# Patient Record
Sex: FEMALE | Race: WHITE | NOT HISPANIC OR LATINO | Employment: OTHER | URBAN - METROPOLITAN AREA
[De-identification: names, ages, dates, MRNs, and addresses within clinical notes are randomized per-mention and may not be internally consistent; named-entity substitution may affect disease eponyms.]

---

## 2017-03-30 ENCOUNTER — TELEPHONE (OUTPATIENT)
Dept: NEUROLOGY | Facility: MEDICAL CENTER | Age: 67
End: 2017-03-30

## 2017-03-30 DIAGNOSIS — G30.9 ALZHEIMER'S DEMENTIA WITHOUT BEHAVIORAL DISTURBANCE, UNSPECIFIED TIMING OF DEMENTIA ONSET: ICD-10-CM

## 2017-03-30 DIAGNOSIS — F02.80 ALZHEIMER'S DEMENTIA WITHOUT BEHAVIORAL DISTURBANCE, UNSPECIFIED TIMING OF DEMENTIA ONSET: ICD-10-CM

## 2017-03-30 RX ORDER — DONEPEZIL HYDROCHLORIDE 10 MG/1
10 TABLET, FILM COATED ORAL EVERY EVENING
Qty: 90 TAB | Refills: 2 | Status: SHIPPED | OUTPATIENT
Start: 2017-03-30 | End: 2022-11-01

## 2017-03-30 NOTE — TELEPHONE ENCOUNTER
I will put a request in for the medication for a 90 day supply but she does need to make a follow up appointment to see Dr. Toscano. Thank you. KA

## 2017-03-30 NOTE — TELEPHONE ENCOUNTER
Pt is in need of refills for the Aricept. Pt is going out of town for 2 months and then is due to see you in the clinic. FRANKO

## 2017-03-30 NOTE — TELEPHONE ENCOUNTER
Jayshree called stating she has no more refills on her Aricept and is leaving on vacation for 2 months in May. Does she need an appointment for refills or can we send them to her pharmacy? ([facility] [address] in [place])

## 2018-07-13 ENCOUNTER — APPOINTMENT (OUTPATIENT)
Dept: RADIOLOGY | Facility: MEDICAL CENTER | Age: 68
DRG: 378 | End: 2018-07-13
Attending: HOSPITALIST
Payer: COMMERCIAL

## 2018-07-13 ENCOUNTER — HOSPITAL ENCOUNTER (INPATIENT)
Facility: MEDICAL CENTER | Age: 68
LOS: 1 days | DRG: 378 | End: 2018-07-14
Attending: EMERGENCY MEDICINE | Admitting: HOSPITALIST
Payer: COMMERCIAL

## 2018-07-13 DIAGNOSIS — K92.1 HEMATOCHEZIA: ICD-10-CM

## 2018-07-13 DIAGNOSIS — F02.80 ALZHEIMER'S DEMENTIA WITHOUT BEHAVIORAL DISTURBANCE, UNSPECIFIED TIMING OF DEMENTIA ONSET: ICD-10-CM

## 2018-07-13 DIAGNOSIS — G30.9 ALZHEIMER'S DEMENTIA WITHOUT BEHAVIORAL DISTURBANCE, UNSPECIFIED TIMING OF DEMENTIA ONSET: ICD-10-CM

## 2018-07-13 PROBLEM — I95.9 HYPOTENSIVE EPISODE: Status: ACTIVE | Noted: 2018-07-13

## 2018-07-13 PROBLEM — F03.90 DEMENTIA (HCC): Status: ACTIVE | Noted: 2018-07-13

## 2018-07-13 LAB
ABO GROUP BLD: NORMAL
ALBUMIN SERPL BCP-MCNC: 4.1 G/DL (ref 3.2–4.9)
ALBUMIN/GLOB SERPL: 1.4 G/DL
ALP SERPL-CCNC: 52 U/L (ref 30–99)
ALT SERPL-CCNC: 14 U/L (ref 2–50)
ANION GAP SERPL CALC-SCNC: 12 MMOL/L (ref 0–11.9)
APTT PPP: 25.7 SEC (ref 24.7–36)
AST SERPL-CCNC: 20 U/L (ref 12–45)
BASOPHILS # BLD AUTO: 0.5 % (ref 0–1.8)
BASOPHILS # BLD AUTO: 0.5 % (ref 0–1.8)
BASOPHILS # BLD: 0.05 K/UL (ref 0–0.12)
BASOPHILS # BLD: 0.05 K/UL (ref 0–0.12)
BILIRUB SERPL-MCNC: 0.3 MG/DL (ref 0.1–1.5)
BLD GP AB SCN SERPL QL: NORMAL
BUN SERPL-MCNC: 22 MG/DL (ref 8–22)
CALCIUM SERPL-MCNC: 9 MG/DL (ref 8.5–10.5)
CFT BLD TEG: 5.8 MIN (ref 5–10)
CHLORIDE SERPL-SCNC: 109 MMOL/L (ref 96–112)
CLOT ANGLE BLD TEG: 67.5 DEGREES (ref 53–72)
CLOT LYSIS 30M P MA LENFR BLD TEG: 4.6 % (ref 0–8)
CO2 SERPL-SCNC: 21 MMOL/L (ref 20–33)
CREAT SERPL-MCNC: 0.84 MG/DL (ref 0.5–1.4)
CT.EXTRINSIC BLD ROTEM: 1.7 MIN (ref 1–3)
EOSINOPHIL # BLD AUTO: 0.07 K/UL (ref 0–0.51)
EOSINOPHIL # BLD AUTO: 0.21 K/UL (ref 0–0.51)
EOSINOPHIL NFR BLD: 0.7 % (ref 0–6.9)
EOSINOPHIL NFR BLD: 2.3 % (ref 0–6.9)
ERYTHROCYTE [DISTWIDTH] IN BLOOD BY AUTOMATED COUNT: 51.9 FL (ref 35.9–50)
ERYTHROCYTE [DISTWIDTH] IN BLOOD BY AUTOMATED COUNT: 52.6 FL (ref 35.9–50)
EST. AVERAGE GLUCOSE BLD GHB EST-MCNC: 120 MG/DL
GLOBULIN SER CALC-MCNC: 3 G/DL (ref 1.9–3.5)
GLUCOSE SERPL-MCNC: 101 MG/DL (ref 65–99)
HBA1C MFR BLD: 5.8 % (ref 0–5.6)
HCT VFR BLD AUTO: 36.1 % (ref 37–47)
HCT VFR BLD AUTO: 38 % (ref 37–47)
HCT VFR BLD AUTO: 38.7 % (ref 37–47)
HCT VFR BLD AUTO: 40.9 % (ref 37–47)
HGB BLD-MCNC: 11.7 G/DL (ref 12–16)
HGB BLD-MCNC: 12.2 G/DL (ref 12–16)
HGB BLD-MCNC: 12.4 G/DL (ref 12–16)
HGB BLD-MCNC: 13.7 G/DL (ref 12–16)
IMM GRANULOCYTES # BLD AUTO: 0.02 K/UL (ref 0–0.11)
IMM GRANULOCYTES # BLD AUTO: 0.04 K/UL (ref 0–0.11)
IMM GRANULOCYTES NFR BLD AUTO: 0.2 % (ref 0–0.9)
IMM GRANULOCYTES NFR BLD AUTO: 0.4 % (ref 0–0.9)
INR PPP: 0.99 (ref 0.87–1.13)
INR PPP: 1.07 (ref 0.87–1.13)
LYMPHOCYTES # BLD AUTO: 1.84 K/UL (ref 1–4.8)
LYMPHOCYTES # BLD AUTO: 3.31 K/UL (ref 1–4.8)
LYMPHOCYTES NFR BLD: 17.1 % (ref 22–41)
LYMPHOCYTES NFR BLD: 36.2 % (ref 22–41)
MAGNESIUM SERPL-MCNC: 2.2 MG/DL (ref 1.5–2.5)
MCF BLD TEG: 63.4 MM (ref 50–70)
MCH RBC QN AUTO: 32.3 PG (ref 27–33)
MCH RBC QN AUTO: 32.9 PG (ref 27–33)
MCHC RBC AUTO-ENTMCNC: 32 G/DL (ref 33.6–35)
MCHC RBC AUTO-ENTMCNC: 33.5 G/DL (ref 33.6–35)
MCV RBC AUTO: 100.8 FL (ref 81.4–97.8)
MCV RBC AUTO: 98.3 FL (ref 81.4–97.8)
MONOCYTES # BLD AUTO: 0.62 K/UL (ref 0–0.85)
MONOCYTES # BLD AUTO: 0.73 K/UL (ref 0–0.85)
MONOCYTES NFR BLD AUTO: 5.8 % (ref 0–13.4)
MONOCYTES NFR BLD AUTO: 8 % (ref 0–13.4)
NEUTROPHILS # BLD AUTO: 4.82 K/UL (ref 2–7.15)
NEUTROPHILS # BLD AUTO: 8.14 K/UL (ref 2–7.15)
NEUTROPHILS NFR BLD: 52.8 % (ref 44–72)
NEUTROPHILS NFR BLD: 75.5 % (ref 44–72)
NRBC # BLD AUTO: 0 K/UL
NRBC # BLD AUTO: 0 K/UL
NRBC BLD-RTO: 0 /100 WBC
NRBC BLD-RTO: 0 /100 WBC
PA AA BLD-ACNC: 44.9 %
PA ADP BLD-ACNC: 0 %
PLATELET # BLD AUTO: 252 K/UL (ref 164–446)
PLATELET # BLD AUTO: 282 K/UL (ref 164–446)
PMV BLD AUTO: 10.5 FL (ref 9–12.9)
PMV BLD AUTO: 10.6 FL (ref 9–12.9)
POTASSIUM SERPL-SCNC: 4 MMOL/L (ref 3.6–5.5)
PROT SERPL-MCNC: 7.1 G/DL (ref 6–8.2)
PROTHROMBIN TIME: 12.8 SEC (ref 12–14.6)
PROTHROMBIN TIME: 13.6 SEC (ref 12–14.6)
RBC # BLD AUTO: 3.84 M/UL (ref 4.2–5.4)
RBC # BLD AUTO: 4.16 M/UL (ref 4.2–5.4)
RH BLD: NORMAL
SODIUM SERPL-SCNC: 142 MMOL/L (ref 135–145)
TEG ALGORITHM TGALG: NORMAL
TSH SERPL DL<=0.005 MIU/L-ACNC: 4.76 UIU/ML (ref 0.38–5.33)
WBC # BLD AUTO: 10.8 K/UL (ref 4.8–10.8)
WBC # BLD AUTO: 9.1 K/UL (ref 4.8–10.8)

## 2018-07-13 PROCEDURE — A9270 NON-COVERED ITEM OR SERVICE: HCPCS | Performed by: HOSPITALIST

## 2018-07-13 PROCEDURE — 85025 COMPLETE CBC W/AUTO DIFF WBC: CPT | Mod: 91

## 2018-07-13 PROCEDURE — 36415 COLL VENOUS BLD VENIPUNCTURE: CPT

## 2018-07-13 PROCEDURE — 83036 HEMOGLOBIN GLYCOSYLATED A1C: CPT

## 2018-07-13 PROCEDURE — 700117 HCHG RX CONTRAST REV CODE 255: Performed by: EMERGENCY MEDICINE

## 2018-07-13 PROCEDURE — 86901 BLOOD TYPING SEROLOGIC RH(D): CPT

## 2018-07-13 PROCEDURE — 86900 BLOOD TYPING SEROLOGIC ABO: CPT

## 2018-07-13 PROCEDURE — 700105 HCHG RX REV CODE 258: Performed by: HOSPITALIST

## 2018-07-13 PROCEDURE — 99285 EMERGENCY DEPT VISIT HI MDM: CPT

## 2018-07-13 PROCEDURE — 770020 HCHG ROOM/CARE - TELE (206)

## 2018-07-13 PROCEDURE — 85576 BLOOD PLATELET AGGREGATION: CPT | Mod: 91

## 2018-07-13 PROCEDURE — 80053 COMPREHEN METABOLIC PANEL: CPT

## 2018-07-13 PROCEDURE — 85018 HEMOGLOBIN: CPT

## 2018-07-13 PROCEDURE — 85730 THROMBOPLASTIN TIME PARTIAL: CPT

## 2018-07-13 PROCEDURE — 74174 CTA ABD&PLVS W/CONTRAST: CPT

## 2018-07-13 PROCEDURE — 85347 COAGULATION TIME ACTIVATED: CPT

## 2018-07-13 PROCEDURE — 83735 ASSAY OF MAGNESIUM: CPT

## 2018-07-13 PROCEDURE — 85014 HEMATOCRIT: CPT | Mod: 91

## 2018-07-13 PROCEDURE — 86850 RBC ANTIBODY SCREEN: CPT

## 2018-07-13 PROCEDURE — 99223 1ST HOSP IP/OBS HIGH 75: CPT | Performed by: HOSPITALIST

## 2018-07-13 PROCEDURE — 85610 PROTHROMBIN TIME: CPT

## 2018-07-13 PROCEDURE — 85384 FIBRINOGEN ACTIVITY: CPT

## 2018-07-13 PROCEDURE — 84443 ASSAY THYROID STIM HORMONE: CPT

## 2018-07-13 PROCEDURE — 700102 HCHG RX REV CODE 250 W/ 637 OVERRIDE(OP): Performed by: HOSPITALIST

## 2018-07-13 RX ORDER — LIOTHYRONINE SODIUM 5 UG/1
5 TABLET ORAL DAILY
COMMUNITY
End: 2019-08-03

## 2018-07-13 RX ORDER — LEVOTHYROXINE SODIUM 88 UG/1
88 TABLET ORAL DAILY
Status: DISCONTINUED | OUTPATIENT
Start: 2018-07-13 | End: 2018-07-14 | Stop reason: HOSPADM

## 2018-07-13 RX ORDER — SODIUM CHLORIDE, SODIUM LACTATE, POTASSIUM CHLORIDE, CALCIUM CHLORIDE 600; 310; 30; 20 MG/100ML; MG/100ML; MG/100ML; MG/100ML
INJECTION, SOLUTION INTRAVENOUS CONTINUOUS
Status: DISCONTINUED | OUTPATIENT
Start: 2018-07-13 | End: 2018-07-14 | Stop reason: HOSPADM

## 2018-07-13 RX ORDER — FERROUS SULFATE 325(65) MG
325 TABLET ORAL DAILY
Status: DISCONTINUED | OUTPATIENT
Start: 2018-07-13 | End: 2018-07-13

## 2018-07-13 RX ORDER — OMEPRAZOLE 20 MG/1
20 CAPSULE, DELAYED RELEASE ORAL DAILY
Status: DISCONTINUED | OUTPATIENT
Start: 2018-07-13 | End: 2018-07-14 | Stop reason: HOSPADM

## 2018-07-13 RX ORDER — SODIUM CHLORIDE 9 MG/ML
INJECTION, SOLUTION INTRAVENOUS ONCE
Status: DISCONTINUED | OUTPATIENT
Start: 2018-07-13 | End: 2018-07-13

## 2018-07-13 RX ORDER — SODIUM CHLORIDE 9 MG/ML
1000 INJECTION, SOLUTION INTRAVENOUS ONCE
Status: COMPLETED | OUTPATIENT
Start: 2018-07-13 | End: 2018-07-13

## 2018-07-13 RX ORDER — DONEPEZIL HYDROCHLORIDE 5 MG/1
10 TABLET, FILM COATED ORAL EVERY EVENING
Status: DISCONTINUED | OUTPATIENT
Start: 2018-07-13 | End: 2018-07-14 | Stop reason: HOSPADM

## 2018-07-13 RX ORDER — LEVOTHYROXINE SODIUM 88 UG/1
88 TABLET ORAL
COMMUNITY

## 2018-07-13 RX ADMIN — OMEPRAZOLE 20 MG: 20 CAPSULE, DELAYED RELEASE ORAL at 11:45

## 2018-07-13 RX ADMIN — IOHEXOL 100 ML: 350 INJECTION, SOLUTION INTRAVENOUS at 10:11

## 2018-07-13 RX ADMIN — SODIUM CHLORIDE, POTASSIUM CHLORIDE, SODIUM LACTATE AND CALCIUM CHLORIDE: 600; 310; 30; 20 INJECTION, SOLUTION INTRAVENOUS at 13:03

## 2018-07-13 RX ADMIN — SODIUM CHLORIDE 1000 ML: 9 INJECTION, SOLUTION INTRAVENOUS at 11:46

## 2018-07-13 RX ADMIN — DONEPEZIL HYDROCHLORIDE 10 MG: 5 TABLET, FILM COATED ORAL at 17:45

## 2018-07-13 ASSESSMENT — COGNITIVE AND FUNCTIONAL STATUS - GENERAL
DRESSING REGULAR UPPER BODY CLOTHING: A LITTLE
CLIMB 3 TO 5 STEPS WITH RAILING: A LITTLE
DRESSING REGULAR LOWER BODY CLOTHING: A LITTLE
WALKING IN HOSPITAL ROOM: A LITTLE
SUGGESTED CMS G CODE MODIFIER DAILY ACTIVITY: CJ
HELP NEEDED FOR BATHING: A LITTLE
DAILY ACTIVITIY SCORE: 21
MOBILITY SCORE: 22
SUGGESTED CMS G CODE MODIFIER MOBILITY: CJ

## 2018-07-13 ASSESSMENT — COPD QUESTIONNAIRES
DO YOU EVER COUGH UP ANY MUCUS OR PHLEGM?: NO/ONLY WITH OCCASIONAL COLDS OR INFECTIONS
COPD SCREENING SCORE: 2
DURING THE PAST 4 WEEKS HOW MUCH DID YOU FEEL SHORT OF BREATH: NONE/LITTLE OF THE TIME
HAVE YOU SMOKED AT LEAST 100 CIGARETTES IN YOUR ENTIRE LIFE: NO/DON'T KNOW

## 2018-07-13 ASSESSMENT — ENCOUNTER SYMPTOMS
BACK PAIN: 0
ABDOMINAL PAIN: 0
WEAKNESS: 1
SHORTNESS OF BREATH: 0
NERVOUS/ANXIOUS: 0
FALLS: 0
CHILLS: 0
DEPRESSION: 0
LOSS OF CONSCIOUSNESS: 0
BACK PAIN: 1
STRIDOR: 0
NAUSEA: 1
DIARRHEA: 0
MYALGIAS: 0
HEADACHES: 0
MEMORY LOSS: 1
BLOOD IN STOOL: 1
FEVER: 0
EYE DISCHARGE: 0
DIZZINESS: 0
TREMORS: 0
NAUSEA: 0
TINGLING: 0
BLURRED VISION: 0
COUGH: 0
VOMITING: 0

## 2018-07-13 ASSESSMENT — PATIENT HEALTH QUESTIONNAIRE - PHQ9
2. FEELING DOWN, DEPRESSED, IRRITABLE, OR HOPELESS: NOT AT ALL
SUM OF ALL RESPONSES TO PHQ9 QUESTIONS 1 AND 2: 0
1. LITTLE INTEREST OR PLEASURE IN DOING THINGS: NOT AT ALL

## 2018-07-13 ASSESSMENT — PAIN SCALES - GENERAL
PAINLEVEL_OUTOF10: 5
PAINLEVEL_OUTOF10: 0

## 2018-07-13 ASSESSMENT — LIFESTYLE VARIABLES: EVER_SMOKED: NEVER

## 2018-07-13 NOTE — ED PROVIDER NOTES
ED Provider Note    ED Provider Note    Primary care provider: Wily Garcia M.D.  Means of arrival: POV  History obtained from:   History limited by: patients Dementia    CHIEF COMPLAINT  Chief Complaint   Patient presents with   • Rectal Bleeding     Pt woke up this am and noticed she was bleeding rectally, reports had large amount on bed. Denies blood thinners. Reports had a similar episode a few years ago and received blood transfusions.        HPI  Jayshree Reinoso is a 67 y.o. female who presents to the Emergency Department her  with a chief complaint of bleeding per rectum.  This started during the night.  She is otherwise been in her normal state of health.  She denies any new pain.  She has chronic back pain and this is unchanged.  She has been tolerating a regular diet over the last several days.  She denies any abdominal pain.  No nausea or vomiting.  No urinary complaints.  No chest pain or shortness of breath.  No fever.  States she got up and felt like she had to go to the bathroom, did not make it to the bathroom.  The  felt over the bed and that was wet he turned on the lights and noticed that it was blood in the bed.  She is not anticoagulated.  She does not take aspirin.  She is taking fish oil.  He reports a similar problem in 2015 or 2016, associated with diverticulitis requiring colonoscopy and blood transfusions at the time.  She has been seen in the past by GI consultants.  She has not followed up with GI consultants since this inpatient colonoscopy and evaluation.  She does have a history of dementia which her  reports has been gradually progressive.  She has not seen a neurologist for a few years.  They did recently see her primary care doctor who started her on an additional medication for dementia.  She is already on one.    REVIEW OF SYSTEMS  Review of Systems   Unable to perform ROS: Dementia   Constitutional: Negative for chills and fever.   HENT: Negative  for congestion.    Respiratory: Negative for shortness of breath.    Cardiovascular: Negative for chest pain.   Gastrointestinal: Positive for blood in stool. Negative for abdominal pain, nausea and vomiting.   Genitourinary: Negative for dysuria.   Musculoskeletal: Positive for back pain.   Neurological: Negative for headaches.   Psychiatric/Behavioral: Positive for memory loss.       PAST MEDICAL HISTORY   has a past medical history of Alzheimer's disease; Alzheimer's disease; Anesthesia; Arthritis; Backpain; Cancer (HCC) (2010); Hypothyroid; Other specified symptom associated with female genital organs; Pain; Renal disorder; Unspecified disorder of thyroid; and Unspecified urinary incontinence.    SURGICAL HISTORY   has a past surgical history that includes scleral buckling (7/9/08); rectus repair (10/9/08); hernia repair; inguinal hernia repair (12/18/2008); laparoscopy (5/7/2009); femoral hernia repair (5/7/2009); lumbar fusion posterior (2/27/2013); lumbar laminectomy diskectomy (2/27/2013); gyn surgery; vaginectomy (7/25/2011); gyn surgery; gyn surgery; knee arthroplasty total (Right, 7/11/2016); and gastroscopy-endo (8/11/2016).    SOCIAL HISTORY  Social History   Substance Use Topics   • Smoking status: Never Smoker   • Smokeless tobacco: Never Used   • Alcohol use No      History   Drug Use No       FAMILY HISTORY  History reviewed. No pertinent family history.    CURRENT MEDICATIONS  Home Medications     Reviewed by Surjit Rangel (Pharmacy Tech) on 07/13/18 at 0916  Med List Status: Complete   Medication Last Dose Status   B Complex Vitamins (VITAMIN B COMPLEX PO) 7/12/2018 Active   CALCIUM-MAGNESIUM PO 7/12/2018 Active   Cholecalciferol (VITAMIN D) 2000 UNITS CAPS 7/12/2018 Active   Coenzyme Q10 (CO Q 10 PO) 7/12/2018 Active   donepezil (ARICEPT) 10 MG tablet 7/12/2018 Active   levothyroxine (SYNTHROID) 88 MCG Tab 7/12/2018 Active   liothyronine (CYTOMEL) 5 MCG Tab 7/12/2018 Active   Melatonin  "3 MG Cap 7/12/2018 Active   NON SPECIFIED 7/12/2018 Active   Omega-3 Fatty Acids (FISH OIL) 1200 MG Cap 7/12/2018 Active   Potassium 99 MG Tab 7/12/2018 Active   Probiotic Product (PROBIOTIC DAILY PO) 7/12/2018 Active   VITAMIN K PO 7/12/2018 Active   Vitamins A & D (VITAMIN A & D) 5000-400 UNITS Cap 7/12/2018 Active                ALLERGIES  Allergies   Allergen Reactions   • Nkda [No Known Drug Allergy]        PHYSICAL EXAM  VITAL SIGNS: /73   Pulse 88   Temp 36.6 °C (97.8 °F)   Resp 16   Ht 1.626 m (5' 4\")   Wt 65.8 kg (145 lb 1 oz)   SpO2 93%   Breastfeeding? No   BMI 24.90 kg/m²   Vitals reviewed.  Constitutional: Patient is oriented to person, place. Appears well-developed and well-nourished. No distress.    Head: Normocephalic and atraumatic.   Ears: Normal external ears bilaterally.   Mouth/Throat: Oropharynx is clear and moist  Eyes: Conjunctivae are normal.   Neck: Normal range of motion. Neck supple.  Cardiovascular: Normal rate, regular rhythm and normal heart sounds. Normal peripheral pulses.  Pulmonary/Chest: Effort normal and breath sounds normal. No respiratory distress, no wheezes, rhonchi, or rales.   Abdominal: Soft. Bowel sounds are normal. There is no tenderness. No rebound or guarding, or peritoneal signs. No CVA tenderness.  Rectal: There is a small anal tear, there does not appear to be any active bleeding.  No pain on digital rectal exam.  No masses or lesions noted.  No external hemorrhoids.  There is ena clotted blood.  Musculoskeletal: No edema and no tenderness.   Lymphadenopathy: No cervical adenopathy.   Neurological: No focal deficits.   Skin: Skin is warm and dry. No erythema. No pallor.   Psychiatric: Patient has a normal mood and affect.     LABS  Results for orders placed or performed during the hospital encounter of 07/13/18   CBC WITH DIFFERENTIAL   Result Value Ref Range    WBC 9.1 4.8 - 10.8 K/uL    RBC 4.16 (L) 4.20 - 5.40 M/uL    Hemoglobin 13.7 12.0 - 16.0 " g/dL    Hematocrit 40.9 37.0 - 47.0 %    MCV 98.3 (H) 81.4 - 97.8 fL    MCH 32.9 27.0 - 33.0 pg    MCHC 33.5 (L) 33.6 - 35.0 g/dL    RDW 51.9 (H) 35.9 - 50.0 fL    Platelet Count 282 164 - 446 K/uL    MPV 10.6 9.0 - 12.9 fL    Neutrophils-Polys 52.80 44.00 - 72.00 %    Lymphocytes 36.20 22.00 - 41.00 %    Monocytes 8.00 0.00 - 13.40 %    Eosinophils 2.30 0.00 - 6.90 %    Basophils 0.50 0.00 - 1.80 %    Immature Granulocytes 0.20 0.00 - 0.90 %    Nucleated RBC 0.00 /100 WBC    Neutrophils (Absolute) 4.82 2.00 - 7.15 K/uL    Lymphs (Absolute) 3.31 1.00 - 4.80 K/uL    Monos (Absolute) 0.73 0.00 - 0.85 K/uL    Eos (Absolute) 0.21 0.00 - 0.51 K/uL    Baso (Absolute) 0.05 0.00 - 0.12 K/uL    Immature Granulocytes (abs) 0.02 0.00 - 0.11 K/uL    NRBC (Absolute) 0.00 K/uL   COMP METABOLIC PANEL   Result Value Ref Range    Sodium 142 135 - 145 mmol/L    Potassium 4.0 3.6 - 5.5 mmol/L    Chloride 109 96 - 112 mmol/L    Co2 21 20 - 33 mmol/L    Anion Gap 12.0 (H) 0.0 - 11.9    Glucose 101 (H) 65 - 99 mg/dL    Bun 22 8 - 22 mg/dL    Creatinine 0.84 0.50 - 1.40 mg/dL    Calcium 9.0 8.5 - 10.5 mg/dL    AST(SGOT) 20 12 - 45 U/L    ALT(SGPT) 14 2 - 50 U/L    Alkaline Phosphatase 52 30 - 99 U/L    Total Bilirubin 0.3 0.1 - 1.5 mg/dL    Albumin 4.1 3.2 - 4.9 g/dL    Total Protein 7.1 6.0 - 8.2 g/dL    Globulin 3.0 1.9 - 3.5 g/dL    A-G Ratio 1.4 g/dL   PROTHROMBIN TIME (INR)   Result Value Ref Range    PT 12.8 12.0 - 14.6 sec    INR 0.99 0.87 - 1.13   APTT   Result Value Ref Range    APTT 25.7 24.7 - 36.0 sec   ESTIMATED GFR   Result Value Ref Range    GFR If African American >60 >60 mL/min/1.73 m 2    GFR If Non African American >60 >60 mL/min/1.73 m 2   CBC WITH DIFFERENTIAL   Result Value Ref Range    WBC 10.8 4.8 - 10.8 K/uL    RBC 3.84 (L) 4.20 - 5.40 M/uL    Hemoglobin 12.4 12.0 - 16.0 g/dL    Hematocrit 38.7 37.0 - 47.0 %    .8 (H) 81.4 - 97.8 fL    MCH 32.3 27.0 - 33.0 pg    MCHC 32.0 (L) 33.6 - 35.0 g/dL    RDW 52.6  (H) 35.9 - 50.0 fL    Platelet Count 252 164 - 446 K/uL    MPV 10.5 9.0 - 12.9 fL    Neutrophils-Polys 75.50 (H) 44.00 - 72.00 %    Lymphocytes 17.10 (L) 22.00 - 41.00 %    Monocytes 5.80 0.00 - 13.40 %    Eosinophils 0.70 0.00 - 6.90 %    Basophils 0.50 0.00 - 1.80 %    Immature Granulocytes 0.40 0.00 - 0.90 %    Nucleated RBC 0.00 /100 WBC    Neutrophils (Absolute) 8.14 (H) 2.00 - 7.15 K/uL    Lymphs (Absolute) 1.84 1.00 - 4.80 K/uL    Monos (Absolute) 0.62 0.00 - 0.85 K/uL    Eos (Absolute) 0.07 0.00 - 0.51 K/uL    Baso (Absolute) 0.05 0.00 - 0.12 K/uL    Immature Granulocytes (abs) 0.04 0.00 - 0.11 K/uL    NRBC (Absolute) 0.00 K/uL   TSH (Thyroid Stimulating Hormone)   Result Value Ref Range    TSH 4.760 0.380 - 5.330 uIU/mL   Hemoglobin A1c   Result Value Ref Range    Glycohemoglobin 5.8 (H) 0.0 - 5.6 %    Est Avg Glucose 120 mg/dL   Magnesium   Result Value Ref Range    Magnesium 2.2 1.5 - 2.5 mg/dL   COD (Adult)   Result Value Ref Range    ABO Grouping Only O     Rh Grouping Only POS     Antibody Screen-Cod NEG    PLATELET MAPPING WITH BASIC TEG   Result Value Ref Range    Reaction Time Initial-R 5.8 5.0 - 10.0 min    Clot Kinetics-K 1.7 1.0 - 3.0 min    Clot Angle-Angle 67.5 53.0 - 72.0 degrees    Maximum Clot Strength-MA 63.4 50.0 - 70.0 mm    Lysis 30 minutes-LY30 4.6 0.0 - 8.0 %    % Inhibition ADP 0.0 %    % Inhibition AA 44.9 %    TEG Algorithm Link Algorithm    HEMOGLOBIN AND HEMATOCRIT   Result Value Ref Range    Hemoglobin 11.7 (L) 12.0 - 16.0 g/dL    Hematocrit 36.1 (L) 37.0 - 47.0 %   Prothrombin time (INR)   Result Value Ref Range    PT 13.6 12.0 - 14.6 sec    INR 1.07 0.87 - 1.13   HEMOGLOBIN AND HEMATOCRIT   Result Value Ref Range    Hemoglobin 12.2 12.0 - 16.0 g/dL    Hematocrit 38.0 37.0 - 47.0 %   CBC without Differential   Result Value Ref Range    WBC 8.4 4.8 - 10.8 K/uL    RBC 3.66 (L) 4.20 - 5.40 M/uL    Hemoglobin 11.8 (L) 12.0 - 16.0 g/dL    Hematocrit 36.2 (L) 37.0 - 47.0 %    MCV 98.9  (H) 81.4 - 97.8 fL    MCH 32.2 27.0 - 33.0 pg    MCHC 32.6 (L) 33.6 - 35.0 g/dL    RDW 51.2 (H) 35.9 - 50.0 fL    Platelet Count 231 164 - 446 K/uL    MPV 10.3 9.0 - 12.9 fL   Comp Metabolic Panel (CMP)   Result Value Ref Range    Sodium 139 135 - 145 mmol/L    Potassium 3.8 3.6 - 5.5 mmol/L    Chloride 110 96 - 112 mmol/L    Co2 21 20 - 33 mmol/L    Anion Gap 8.0 0.0 - 11.9    Glucose 80 65 - 99 mg/dL    Bun 13 8 - 22 mg/dL    Creatinine 0.59 0.50 - 1.40 mg/dL    Calcium 8.1 (L) 8.5 - 10.5 mg/dL    AST(SGOT) 13 12 - 45 U/L    ALT(SGPT) 11 2 - 50 U/L    Alkaline Phosphatase 47 30 - 99 U/L    Total Bilirubin 0.4 0.1 - 1.5 mg/dL    Albumin 3.5 3.2 - 4.9 g/dL    Total Protein 5.9 (L) 6.0 - 8.2 g/dL    Globulin 2.4 1.9 - 3.5 g/dL    A-G Ratio 1.5 g/dL   Lipid Profile (Lipid Panel) Fasting   Result Value Ref Range    Cholesterol,Tot 158 100 - 199 mg/dL    Triglycerides 258 (H) 0 - 149 mg/dL    HDL 29 (A) >=40 mg/dL    LDL 77 <100 mg/dL   ESTIMATED GFR   Result Value Ref Range    GFR If African American >60 >60 mL/min/1.73 m 2    GFR If Non African American >60 >60 mL/min/1.73 m 2       All labs reviewed by me.      RADIOLOGY  CTA ABDOMEN PELVIS W & W/O POST PROCESS   Final Result      1.  No extravasation of contrast into the bowel to determine location of gastrointestinal bleeding.      2.  Pandiverticulosis.      3.  Atherosclerotic disease      4.  Status post cholecystectomy.        The radiologist's interpretation of all radiological studies have been reviewed by me.    COURSE & MEDICAL DECISION MAKING  Pertinent Labs & Imaging studies reviewed. (See chart for details)    Obtained and reviewed past medical records.  Patient's last ED encounter was in August 2016.  She was seen for chest pain, arm pain and painful urination.  She was evaluated for pulmonary embolism, CT scan was negative.  Patient has followed up in the outpatient setting.  Last encounter was with Dr. Garces in October 2016.  She does have a history  of dementia.    5:00 AM - Patient seen and examined at bedside.  This is a pleasant, 67-year-old female who presents with her .  Much of the history as well as ROS is obtained from the patient's  secondary to her dementia.  She has normal vital signs.  She is not experiencing any pain.  She does have soft, clotted blood on rectal exam.  I have ordered labs including coags to evaluate her symptoms.     Differential diagnosis includes but not limited to: Diverticular bleeding, rectal bleeding, coagulopathy, and anemia.    6:07 AM labs reviewed.  Patient has a normal white blood cell count 9.1.  H&H 13.7 and 40.9.  Normal platelet count.  No shift.  Chemistry is unrevealing and normal other than a slightly elevated anion gap of 12 and a glucose of 101. INR normal, 0.99.    She is reevaluated at the bedside.  She thinks that she has not had any further bleeding, although her history appears to be somewhat unreliable.  I have advised that we should recheck her hemoglobin it has been a few hours now.  And I will have the nurse bring her to the bathroom, to see if she is having any further passive bleeding into the toilet.    Repeat hemoglobin down to 12.4 from 13.7.     8:24 AM GI paged    8:25 AM, discussed with hospitalist, who agrees to admit the patient to their service.  She is aware, awaiting response from GI.    8:55 AM, discussed with Dr. White who is not on-call today.  She was able to look up this patient's chart.  She recommends calling Dr. Pandey who is on-call for their group today.    9:04 AM, discussed with Dr. Pandey who recommends a tagged cell scan or CT angiography if concern for ongoing bleeding.  I discussed with him her drop in hemoglobin.  Patient will be admitted to the hospitalist.  He will see her shortly. I have ordered a CTA and spoken with hospitalist regarding updated plan of care.    CT scan shows no active extravasation of bleeding.  In addition, patient is now  known to be a DNR.  She is not a surgical candidate based on her and her 's choice.    Patient's admitted in stable but guarded condition.    The total critical care time on this patient is 40 minutes, resuscitating patient, speaking with admitting physician, and deciphering test results. This 40 minutes is exclusive of separately billable procedures.      FINAL IMPRESSION  1. Hematochezia    2. Alzheimer's dementia without behavioral disturbance, unspecified timing of dementia onset       Critical care time; 40 minutes

## 2018-07-13 NOTE — ED NOTES
Pt returned to room from CT. Exam tolerated well. Tele 8 notified that pt is ready to be transferred.

## 2018-07-13 NOTE — CONSULTS
Gastroenterology ([facility]) Progress Note    Name Jayshree Reinoso     1950   Age/Sex 67 y.o. female   MRN [id number]           Reason for consult   Hematochezia     Impression  A 68 yo F with a past medical history of dementia and prior admission back in 2016 for similar presentation with hematochezia requiring ICU admission. Workup during that time was remarkable for pan-diverticulosis that was thought to be th source for her bleeding with negative EGD and CTA A/P x2. She did not have any further episodes of hematemesis or hematochezia till her current presentation. Patient currently presents in a stable condition with sudden onset hematochezia without any associated GI symptoms. CTA revealed pan-diverticulosis but no source of bleeding to be embolized. Suspect LGIB from diverticular hemorrhage, no obvious source of bleeding on CTA that can be treated by IR. Discussed surgical options with  who does wish for invasive intervention. We recommended supportive therapy for now and to monitor clinical status.    Assessment   Lower GI bleeding   Pan-diverticulosis   Dementia   Hypothyroidism     Recommendations   - Conservative therapy  - Check serial H&H  - Transfuse for HGB <7  - Okay to start her on clear diet   - Consider CTA or RBC nuclear scan for acute bleeding   - Will hold of on colonoscopy or endoscopy      HPI  A 68 yo F with a past medical history of dementia, hypothyroidism, pan-diverticulosis s/p lower GI bleeding (2016), presents with acute onset hematochezia. History was obtained from patient and her . She woke up this morning around 0300 with bright blood on the bed coming from her rectum. She denies any nausea, vomiting, hematemesis, melena, abdominal pain, diarrhea, tenesmus or rectal pain. No reported fever, chills, night sweats, weight loss, dizziness, presyncope or syncope. Her  brought in for further evaluation. She was in stable condition on  admission, HGB of 13 with normal BUN/Cr ratio and no witnessed episodes of hematochezia in ER.     Patient does not take blood thinner or antiplatelet therapy, no reported OTC NSAIDs use or Etoh intake. Last EGD was back in 8/2018 that revealed gastritis, Flex sig during that time was significant for pan-diverticulosis.       Review of Systems   Constitutional: Negative for chills and fever.   Respiratory: Negative for cough, shortness of breath and stridor.    Cardiovascular: Negative for chest pain and leg swelling.   Gastrointestinal: Positive for blood in stool. Negative for abdominal pain, diarrhea, melena, nausea and vomiting.   Genitourinary: Negative for dysuria.   Musculoskeletal: Negative for back pain and falls.   Neurological: Negative for dizziness and loss of consciousness.       Quality-Core Measures        Physical Exam       Vitals:    07/13/18 0715 07/13/18 0830 07/13/18 0845 07/13/18 0906   BP:       Pulse:  73 65 71   Resp:  15 17 18   Temp:       SpO2: 96% 95% 96% 93%   Weight:       Height:         Body mass index is 24.72 kg/m². Weight: 65.3 kg (144 lb)  Oxygen Therapy:  Pulse Oximetry: 93 %, O2 (LPM): 2, O2 Delivery: Nasal Cannula    Physical Exam   Constitutional: She is oriented to person, place, and time. No distress.   HENT:   Head: Normocephalic and atraumatic.   Eyes: Conjunctivae are normal. No scleral icterus.   Cardiovascular: Normal rate, regular rhythm and normal heart sounds.    Pulmonary/Chest: Effort normal and breath sounds normal. No stridor. No respiratory distress. She has no wheezes.   Abdominal: Soft. Bowel sounds are normal. She exhibits no distension. There is tenderness. There is no rebound.   Mild TTP at LLQ   Musculoskeletal: She exhibits no edema.   Neurological: She is alert and oriented to person, place, and time. GCS score is 15.   Skin: Skin is warm and dry. She is not diaphoretic.         Lab Data Review:      7/13/2018  10:12 AM    Recent Labs      07/13/18    0455   SODIUM  142   POTASSIUM  4.0   CHLORIDE  109   CO2  21   BUN  22   CREATININE  0.84   MAGNESIUM  2.2   CALCIUM  9.0       Recent Labs      07/13/18   0455   ALTSGPT  14   ASTSGOT  20   ALKPHOSPHAT  52   TBILIRUBIN  0.3   GLUCOSE  101*       Recent Labs      07/13/18   0455  07/13/18   0730   RBC  4.16*  3.84*   HEMOGLOBIN  13.7  12.4   HEMATOCRIT  40.9  38.7   PLATELETCT  282  252   PROTHROMBTM  12.8   --    APTT  25.7   --    INR  0.99   --        Recent Labs      07/13/18   0455  07/13/18   0730   WBC  9.1  10.8   NEUTSPOLYS  52.80  75.50*   LYMPHOCYTES  36.20  17.10*   MONOCYTES  8.00  5.80   EOSINOPHILS  2.30  0.70   BASOPHILS  0.50  0.50   ASTSGOT  20   --    ALTSGPT  14   --    ALKPHOSPHAT  52   --    TBILIRUBIN  0.3   --

## 2018-07-13 NOTE — ASSESSMENT & PLAN NOTE
Patient with hx of pandiverticulosis here for acute lower GI bleed since this morning  Hgb stable at 12, asymptomatic  Patient with previous workup unrevealing of a bleeding source per GI in 2016 with hemorrhagic shock requiring ICU admission    -- At this time admit to tele with monitoring  -- GI consulted- recs to monitor; CTA abd/pelvis ordered, if a source is found, will need IR consulted, however if there is no source then continue to monitor, may need a C-scope however with her hx of pandiverticulosis surgery is an option however patient DNR and does not want surgery  -- continue to monitor h/h [id number]  -- Type and cross; transfuse if Hgb <7  -- IVF bolus and Mivf started  -- no white count, not septic

## 2018-07-13 NOTE — PROGRESS NOTES
Pt arrived to the floor with flex RN [place], pt is a awake and alert with no reports of pain or discomfort, able to walk from the acosta to the bed with standby assistance,  at the bedside. Pt connected to the tele box and monitor room called to verify tele status: SR [id number]. Pt oriented to the room, restroom, call light and TV controls. Pt educated on how to call for assistance for issues or needs, pt verbally acknowledges understanding. All questions and needs met at this time.

## 2018-07-13 NOTE — H&P
Hospital Medicine History and Physical    Date of Service  7/13/2018    Chief Complaint  Chief Complaint   Patient presents with   • Rectal Bleeding     Pt woke up this am and noticed she was bleeding rectally, reports had large amount on bed. Denies blood thinners. Reports had a similar episode a few years ago and received blood transfusions.        History of Presenting Illness  67 y.o. female who presented 7/13/2018 with hx of diverticular bleed in 2016, dementia, and hypothyroidism here for acute rectal bleeding that started this morning.  noted that she got up to the restroom and the bed was soaked with blood. She has hx of dementia, poor historian, wants to go home. However  at bedside. Patient denies any dizziness,+ weakness, no syncopal epsiodes    Patient with hx of GI bleed in 2016 with hemorrhagic shock requiring ICU admission, at that time no source was found, Diverticular bleed, s/p C-scope per GI and surgery consult without any intervention were on board at that time.     2016:  flexible esophagogastroduodenoscopy: Nonerosive gastritis, nonerosive duodenitis with   possible small posterior was duodenal ulcer without any blood in the upper GI   tract.  2016: Flexible sigmoidoscopy.Pandiverticulosis (mild right colon, severe sigmoid   colon), small amount of dark, thin liquid in right colon, small amount red   blood in sigmoid colon.  No active bleeding  Primary Care Physician  Wily Garcia M.D.    Consultants  GI    Code Status  DNR; of for IVF, abx, blood and central line    Review of Systems  Review of Systems   Constitutional: Positive for malaise/fatigue. Negative for fever.   HENT: Negative for congestion and hearing loss.    Eyes: Negative for blurred vision and discharge.   Respiratory: Negative for cough and shortness of breath.    Cardiovascular: Negative for chest pain and leg swelling.   Gastrointestinal: Positive for blood in stool and nausea. Negative for abdominal pain  and vomiting.   Genitourinary: Negative for dysuria and hematuria.   Musculoskeletal: Negative for joint pain and myalgias.   Neurological: Positive for weakness. Negative for dizziness, tingling, tremors and headaches.   Psychiatric/Behavioral: Negative for depression. The patient is not nervous/anxious.         Past Medical History  Past Medical History:   Diagnosis Date   • Cancer (HCC) 2010    skin melanoma   • Alzheimer's disease    • Alzheimer's disease    • Anesthesia     PONV   • Arthritis     RA and osteo   • Backpain    • Hypothyroid    • Other specified symptom associated with female genital organs     vaginal mesh removed   • Pain     bilateral knee   • Renal disorder     renal fatigue   • Unspecified disorder of thyroid    • Unspecified urinary incontinence        Surgical History  Past Surgical History:   Procedure Laterality Date   • GASTROSCOPY-ENDO  8/11/2016    Procedure: GASTROSCOPY-ENDO;  Surgeon: Ruchi Akers M.D.;  Location: SURGERY [facility];  Service:    • KNEE ARTHROPLASTY TOTAL Right 7/11/2016    Procedure: KNEE ARTHROPLASTY TOTAL;  Surgeon: Rayray Valentine M.D.;  Location: [facility];  Service:    • LUMBAR FUSION POSTERIOR  2/27/2013    Performed by Sonny Flores M.D. at SURGERY [facility]   • LUMBAR LAMINECTOMY DISKECTOMY  2/27/2013    Performed by Sonny Flores M.D. at SURGERY [facility]   • VAGINECTOMY  7/25/2011    Performed by MICHELLE BURT at SURGERY SAME DAY [facility] ORS   • LAPAROSCOPY  5/7/2009    Performed by RODY SAENZ at SURGERY [facility]   • FEMORAL HERNIA REPAIR  5/7/2009    Performed by RODY SAENZ at SURGERY [facility]   • INGUINAL HERNIA REPAIR  12/18/2008    Performed by RODY SAENZ at SURGERY SAME DAY [facility] ORS   • RECTUS REPAIR  10/9/08    Performed by EFREN REYNOLDS at SURGERY SAME DAY [facility] ORS   • SCLERAL BUCKLING  7/9/08    Performed by JORGE DAVENPORT at SURGERY SAME DAY [facility]   •  GYN SURGERY     • GYN SURGERY      vaginal mesh removed   • GYN SURGERY      hysterectomy   • HERNIA REPAIR         Medications  No current facility-administered medications on file prior to encounter.      Current Outpatient Prescriptions on File Prior to Encounter   Medication Sig Dispense Refill   • donepezil (ARICEPT) 10 MG tablet Take 1 Tab by mouth every evening. 90 Tab 2   • Flavoring Agent (BLUEBERRY FLAVOR) Liquid by Does not apply route.     • Omega-3 Fatty Acids (FISH OIL) 1200 MG Cap Take  by mouth.     • ferrous sulfate 325 (65 FE) MG tablet Take 1 Tab by mouth every day. 30 Tab 3   • Vitamins A & D (VITAMIN A & D) 5000-400 UNITS Cap Take  by mouth every evening.     • VITAMIN K PO Take 100 mg by mouth every morning.     • CALCIUM-MAGNESIUM PO Take 1 Tab by mouth every evening. 1000/500mg     • Melatonin 3 MG Cap Take  by mouth every evening.     • Potassium 99 MG Tab Take  by mouth every evening.     • Probiotic Product (PROBIOTIC DAILY PO) Take 2 Caps by mouth every morning.     • Zinc 50 MG Tab Take  by mouth every morning.     • B Complex Vitamins (VITAMIN B COMPLEX PO) Take  by mouth every evening.     • NON SPECIFIED 60 mg 2 Times a Day. Blueberry extract     • Coenzyme Q10 (CO Q 10 PO) Take 200 mg by mouth every morning.     • Cholecalciferol (VITAMIN D) 2000 UNITS CAPS Take 4,000 Units by mouth every morning.     • levothyroxine (SYNTHROID) 112 MCG TABS Take 112 mcg by mouth every day. Indications: Underactive Thyroid         Family History  History reviewed. No pertinent family history.  No hx of colon cancers or GI issues  Social History  Social History   Substance Use Topics   • Smoking status: Never Smoker   • Smokeless tobacco: Never Used   • Alcohol use No       Allergies  Allergies   Allergen Reactions   • Nkda [No Known Drug Allergy]         Physical Exam  Laboratory   Hemodynamics  Temp (24hrs), Av.9 °C (96.6 °F), Min:35.9 °C (96.6 °F), Max:35.9 °C (96.6 °F)   Temperature: 35.9 °C  (96.6 °F)  Pulse  Av.5  Min: 61  Max: 81 Heart Rate (Monitored): 64  Blood Pressure : 103/69, NIBP: 108/80      Respiratory      Respiration: 20, Pulse Oximetry: 96 %             Physical Exam   Constitutional: She is oriented to person, place, and time. She appears well-developed and well-nourished. No distress.   HENT:   Head: Normocephalic and atraumatic.   Mouth/Throat: No oropharyngeal exudate.   Eyes: Conjunctivae and EOM are normal. Pupils are equal, round, and reactive to light.   Neck: Normal range of motion. No JVD present.   Cardiovascular: Normal rate, regular rhythm and normal heart sounds.    Pulmonary/Chest: Effort normal and breath sounds normal. No respiratory distress. She has no wheezes. She has no rales.   Abdominal: Soft. Bowel sounds are normal. She exhibits no distension. There is no tenderness. There is no rebound.   Musculoskeletal: Normal range of motion. She exhibits no edema, tenderness or deformity.   Lymphadenopathy:     She has no cervical adenopathy.   Neurological: She is alert and oriented to person, place, and time. She exhibits normal muscle tone.   Skin: Skin is warm and dry. No erythema.   Psychiatric: She has a normal mood and affect.       Recent Labs      18   0455  18   0730   WBC  9.1  10.8   RBC  4.16*  3.84*   HEMOGLOBIN  13.7  12.4   HEMATOCRIT  40.9  38.7   MCV  98.3*  100.8*   MCH  32.9  32.3   MCHC  33.5*  32.0*   RDW  51.9*  52.6*   PLATELETCT  282  252   MPV  10.6  10.5     Recent Labs      18   0455   SODIUM  142   POTASSIUM  4.0   CHLORIDE  109   CO2  21   GLUCOSE  101*   BUN  22   CREATININE  0.84   CALCIUM  9.0     Recent Labs      18   ALTSGPT  14   ASTSGOT  20   ALKPHOSPHAT  52   TBILIRUBIN  0.3   GLUCOSE  101*     Recent Labs      185   APTT  25.7   INR  0.99             Lab Results   Component Value Date    TROPONINI <0.01 2016     Urinalysis:    Lab Results  Component Value Date/Time   SPECGRAVITY 1.025  08/24/2016 1230   GLUCOSEUR Negative 08/24/2016 1230   KETONES Negative 08/24/2016 1230   NITRITE Positive (A) 08/24/2016 1230   WBCURINE >150 (A) 08/24/2016 1230   RBCURINE 100-150 (A) 08/24/2016 1230   BACTERIA Many (A) 08/24/2016 1230   EPITHELCELL Few 08/24/2016 1230        Imaging  1.  No extravasation of contrast into the bowel to determine location of gastrointestinal bleeding.    2.  Pandiverticulosis.    3.  Atherosclerotic disease    4.  Status post cholecystectomy.   Assessment/Plan     I anticipate this patient will require at least two midnights for appropriate medical management, necessitating inpatient admission.    * Hematochezia   Assessment & Plan    Patient with hx of pandiverticulosis here for acute lower GI bleed since this morning  Hgb stable at 12, asymptomatic  Patient with previous workup unrevealing of a bleeding source per GI in 2016 with hemorrhagic shock requiring ICU admission    -- At this time admit to tele with monitoring  -- GI consulted- recs to monitor; CTA abd/pelvis ordered, if a source is found, will need IR consulted, however if there is no source then continue to monitor, may need a C-scope however with her hx of pandiverticulosis surgery is an option however patient DNR and does not want surgery  -- continue to monitor h/h [id number]  -- Type and cross; transfuse if Hgb <7  -- IVF bolus and Mivf started  -- no white count, not septic        Dehydration- (present on admission)   Assessment & Plan    Starting IV bolus NS and then LR mivf        Dementia   Assessment & Plan    Hx of dementia, poor historian,  at bedside  Continue home meds        Hypotensive episode   Assessment & Plan    Patient with acute GI bleed, BP slightly low, patient asymptomatic  Will start her on NS bolus and then LR mIVF  Monitor BP and h/h        Acute blood loss anemia- (present on admission)   Assessment & Plan    Currently hgb stable at 12, continue to monitor given GI bleed  H/H q6  hours  Transfuse if <7        Hypothyroid- (present on admission)   Assessment & Plan    Check TSH  Continue home med            VTE prophylaxis: scds

## 2018-07-13 NOTE — ASSESSMENT & PLAN NOTE
Patient with acute GI bleed, BP slightly low, patient asymptomatic  Will start her on NS bolus and then LR mIVF  Monitor BP and h/h

## 2018-07-13 NOTE — PROGRESS NOTES
2-RN Skin Check with Odessa LEE:  Ears, elbows, stomach, sacrum and heals are all clean, dry and intact, pink and blanching with no signs of skin breakdown. Pt is ambulatory and able to turn independently.

## 2018-07-13 NOTE — ED TRIAGE NOTES
"Chief Complaint   Patient presents with   • Rectal Bleeding     Pt woke up this am and noticed she was bleeding rectally, reports had large amount on bed. Denies blood thinners. Reports had a similar episode a few years ago and received blood transfusions.      /69   Pulse 81   Temp 35.9 °C (96.6 °F)   Resp 16   Ht 1.626 m (5' 4\")   Wt 65.3 kg (144 lb)   SpO2 93%   BMI 24.72 kg/m²     Pt to triage via WC, reports unable to stand for weight as she is worried blood will come out again. Pt with . Returned to [facility], educated on triage process, instructed to notify staff of worsening symptoms/concerns.   "

## 2018-07-13 NOTE — CONSULTS
DATE OF SERVICE:  07/13/2018    REASON FOR CONSULTATION:  Hematochezia and acute GI blood loss anemia.    REQUESTING PHYSICIAN:  Socorro Sesay MD    HISTORY OF PRESENT ILLNESS:  Thank you very much for asking me to see this   very pleasant 67-year-old woman who has a history dementia, but is alert and   oriented and just slightly a poor historian.  She reports that 2 years ago,   she had an episode of bright red blood per rectum.  Thorough workup at that   time was only consistent with pandiverticulosis.  I have reviewed these   records and I agree.  Since that time, she has had no further bleeding until   3:00 a.m. this morning when she awoke and was found to have a large volume of   bright red blood in the bed.  She had no abdominal pain.  Since that single   episode, she has had no further bowel movements or instability.  Her    brought her to the ER where she was admitted.  She denies abdominal pain.  No   nausea or vomiting.  No chest pain or shortness breath.  No fevers, chills, or   sweats.  Serial CBCs are stable.  Vital signs have remained stable.    REVIEW OF SYSTEMS:  A 10-system review of systems performed by myself,   pertinent positives and negatives stated otherwise noncontributory.    PAST MEDICAL HISTORY:  Diverticulosis with diverticular bleeding, dementia,   and hypothyroidism.    PAST SURGICAL HISTORY:  Colonoscopy in August 2016.    ADVERSE DRUG REACTIONS:  No known drug allergies.    MEDICATIONS:  Reviewed by myself.  Please see the chart for detail.  Of note,   she is not on any anticoagulants.    SOCIAL HISTORY:  She does not smoke tobacco nor does she drink alcohol to   excess.    FAMILY HISTORY:  Negative for gastrointestinal, hepatobiliary, or pancreatic   disease.    LABORATORY DATA:  White count 10.8, hemoglobin 12.4, hematocrit 38.7, and   platelets 252.  Sodium 142, potassium 4.0, chloride 109, bicarbonate 21, BUN   22, creatinine 0.84, AST 20, ALT 14, alkaline phosphatase 52,  and total   bilirubin 0.3.  INR 0.99.    PHYSICAL EXAMINATION:  VITAL SIGNS:  Temperature 98.1, pulse of 58, and blood pressure 96/58.  GENERAL:  She is in no distress.  She is alert, oriented x3, but does seem a   little bit confused.  HEENT:  Sclerae nonicteric.  Mucous membranes are pink and moist.  No head and   neck adenopathy.  LUNGS:  Clear bilaterally.  HEART:  Sounds regular.  ABDOMEN:  Soft, nontender, nondistended.    ASSESSMENT:  1.  Hematochezia with a single episode, which was self-limited.  2.  Diverticulosis.  3.  Dementia.  4.  Hypothyroidism.    DISCUSSION:  1.  Currently, she has only had one bloody bowel movement over the last 24   hours and has a history of diverticular disease with colonoscopy within the   last 2 years.  She is hemodynamically stable.  So, for now, we will be   conservative and just would advocate watching her.  2.  Would check serial CBCs.  3.  Transfuse to keep hemoglobin greater than or equal to 7.  4.  If acute bleeding occurs, then a stat CT angiogram versus stat tagged red   blood cell nuclear medicine scan should be ordered.  5.  Clear diet is okay from a GI standpoint, no reds or purples.  6.  No endoscopy is planned until or unless recurrent bleeding is seen.  7.  If she is stable with no further bleeding, she may go home tomorrow from a   GI standpoint given her strong family support.    Thank you very much for allowing me to participate in the care of this nice   lady.  If you have any questions, please do not hesitate to call.       ____________________________________     MD MACY FIGUEROA / DEMARIO    DD:  07/13/2018 13:32:26  DT:  07/13/2018 13:49:45    D#:  [id number]  Job#:  [id number]

## 2018-07-14 VITALS
TEMPERATURE: 98.5 F | BODY MASS INDEX: 24.77 KG/M2 | HEIGHT: 64 IN | HEART RATE: 61 BPM | DIASTOLIC BLOOD PRESSURE: 78 MMHG | SYSTOLIC BLOOD PRESSURE: 131 MMHG | WEIGHT: 145.06 LBS | RESPIRATION RATE: 18 BRPM | OXYGEN SATURATION: 97 %

## 2018-07-14 PROBLEM — K92.1 HEMATOCHEZIA: Status: RESOLVED | Noted: 2018-07-13 | Resolved: 2018-07-14

## 2018-07-14 PROBLEM — I95.9 HYPOTENSIVE EPISODE: Status: RESOLVED | Noted: 2018-07-13 | Resolved: 2018-07-14

## 2018-07-14 LAB
ALBUMIN SERPL BCP-MCNC: 3.5 G/DL (ref 3.2–4.9)
ALBUMIN/GLOB SERPL: 1.5 G/DL
ALP SERPL-CCNC: 47 U/L (ref 30–99)
ALT SERPL-CCNC: 11 U/L (ref 2–50)
ANION GAP SERPL CALC-SCNC: 8 MMOL/L (ref 0–11.9)
AST SERPL-CCNC: 13 U/L (ref 12–45)
BILIRUB SERPL-MCNC: 0.4 MG/DL (ref 0.1–1.5)
BUN SERPL-MCNC: 13 MG/DL (ref 8–22)
CALCIUM SERPL-MCNC: 8.1 MG/DL (ref 8.5–10.5)
CHLORIDE SERPL-SCNC: 110 MMOL/L (ref 96–112)
CHOLEST SERPL-MCNC: 158 MG/DL (ref 100–199)
CO2 SERPL-SCNC: 21 MMOL/L (ref 20–33)
CREAT SERPL-MCNC: 0.59 MG/DL (ref 0.5–1.4)
ERYTHROCYTE [DISTWIDTH] IN BLOOD BY AUTOMATED COUNT: 51.2 FL (ref 35.9–50)
GLOBULIN SER CALC-MCNC: 2.4 G/DL (ref 1.9–3.5)
GLUCOSE SERPL-MCNC: 80 MG/DL (ref 65–99)
HCT VFR BLD AUTO: 36.2 % (ref 37–47)
HCT VFR BLD AUTO: 37 % (ref 37–47)
HDLC SERPL-MCNC: 29 MG/DL
HGB BLD-MCNC: 11.8 G/DL (ref 12–16)
HGB BLD-MCNC: 12.1 G/DL (ref 12–16)
LDLC SERPL CALC-MCNC: 77 MG/DL
MCH RBC QN AUTO: 32.2 PG (ref 27–33)
MCHC RBC AUTO-ENTMCNC: 32.6 G/DL (ref 33.6–35)
MCV RBC AUTO: 98.9 FL (ref 81.4–97.8)
PLATELET # BLD AUTO: 231 K/UL (ref 164–446)
PMV BLD AUTO: 10.3 FL (ref 9–12.9)
POTASSIUM SERPL-SCNC: 3.8 MMOL/L (ref 3.6–5.5)
PROT SERPL-MCNC: 5.9 G/DL (ref 6–8.2)
RBC # BLD AUTO: 3.66 M/UL (ref 4.2–5.4)
SODIUM SERPL-SCNC: 139 MMOL/L (ref 135–145)
TRIGL SERPL-MCNC: 258 MG/DL (ref 0–149)
WBC # BLD AUTO: 8.4 K/UL (ref 4.8–10.8)

## 2018-07-14 PROCEDURE — 80053 COMPREHEN METABOLIC PANEL: CPT

## 2018-07-14 PROCEDURE — 99239 HOSP IP/OBS DSCHRG MGMT >30: CPT | Performed by: HOSPITALIST

## 2018-07-14 PROCEDURE — 85018 HEMOGLOBIN: CPT

## 2018-07-14 PROCEDURE — 700102 HCHG RX REV CODE 250 W/ 637 OVERRIDE(OP): Performed by: HOSPITALIST

## 2018-07-14 PROCEDURE — 36415 COLL VENOUS BLD VENIPUNCTURE: CPT

## 2018-07-14 PROCEDURE — 700105 HCHG RX REV CODE 258: Performed by: HOSPITALIST

## 2018-07-14 PROCEDURE — 85014 HEMATOCRIT: CPT

## 2018-07-14 PROCEDURE — A9270 NON-COVERED ITEM OR SERVICE: HCPCS | Performed by: HOSPITALIST

## 2018-07-14 PROCEDURE — 85027 COMPLETE CBC AUTOMATED: CPT

## 2018-07-14 PROCEDURE — 80061 LIPID PANEL: CPT

## 2018-07-14 RX ADMIN — SODIUM CHLORIDE, POTASSIUM CHLORIDE, SODIUM LACTATE AND CALCIUM CHLORIDE: 600; 310; 30; 20 INJECTION, SOLUTION INTRAVENOUS at 05:27

## 2018-07-14 RX ADMIN — LEVOTHYROXINE SODIUM 88 MCG: 88 TABLET ORAL at 05:26

## 2018-07-14 RX ADMIN — OMEPRAZOLE 20 MG: 20 CAPSULE, DELAYED RELEASE ORAL at 05:26

## 2018-07-14 ASSESSMENT — LIFESTYLE VARIABLES: ALCOHOL_USE: NO

## 2018-07-14 NOTE — CARE PLAN
Problem: Communication  Goal: The ability to communicate needs accurately and effectively will improve  Outcome: PROGRESSING AS EXPECTED  Pt educated on the use of the call light and television controls, and oriented to the room, restroom, and unit. Pt educated how to call staff for any issues, problems or concerns. Pt verbalized understanding of all controls, orientation, how to call for needs.

## 2018-07-14 NOTE — DISCHARGE INSTRUCTIONS
Discharge Instructions    Discharged to home by car with relative. Discharged via wheelchair, hospital escort: Yes.  Special equipment needed: Not Applicable    Be sure to schedule a follow-up appointment with your primary care doctor or any specialists as instructed.     Discharge Plan:   Diet Plan: Discussed  Activity Level: Discussed  Confirmed Follow up Appointment: Patient to Call and Schedule Appointment  Confirmed Symptoms Management: Discussed  Medication Reconciliation Updated: Yes  Pneumococcal Vaccine Administered/Refused: Not given - Patient refused pneumococcal vaccine  Influenza Vaccine Indication: Not indicated: Previously immunized this influenza season and > 8 years of age    I understand that a diet low in cholesterol, fat, and sodium is recommended for good health. Unless I have been given specific instructions below for another diet, I accept this instruction as my diet prescription.   Other diet:     Special Instructions: Return to ER if needed.  Follow up with GI.  They will call you to set up an appointment.    · Is patient discharged on Warfarin / Coumadin?   No     Depression / Suicide Risk    As you are discharged from this [facility] facility, it is important to learn how to keep safe from harming yourself.    Recognize the warning signs:  · Abrupt changes in personality, positive or negative- including increase in energy   · Giving away possessions  · Change in eating patterns- significant weight changes-  positive or negative  · Change in sleeping patterns- unable to sleep or sleeping all the time   · Unwillingness or inability to communicate  · Depression  · Unusual sadness, discouragement and loneliness  · Talk of wanting to die  · Neglect of personal appearance   · Rebelliousness- reckless behavior  · Withdrawal from people/activities they love  · Confusion- inability to concentrate     If you or a loved one observes any of these behaviors or has concerns about self-harm, here's  what you can do:  · Talk about it- your feelings and reasons for harming yourself  · Remove any means that you might use to hurt yourself (examples: pills, rope, extension cords, firearm)  · Get professional help from the community (Mental Health, Substance Abuse, psychological counseling)  · Do not be alone:Call your Safe Contact- someone whom you trust who will be there for you.  · Call your local CRISIS HOTLINE 176-5465 or 591-736-2091  · Call your local Children's Mobile Crisis Response Team [place] (496) 311-4723 or www.StrikeAd  · Call the toll free National Suicide Prevention Hotlines   · National Suicide Prevention Lifeline 605-797-ZQGD (1577)  · National Hope Line Network 800-SUICIDE (432-0829)

## 2018-07-14 NOTE — DISCHARGE SUMMARY
Discharge Summary    CHIEF COMPLAINT ON ADMISSION  Chief Complaint   Patient presents with   • Rectal Bleeding     Pt woke up this am and noticed she was bleeding rectally, reports had large amount on bed. Denies blood thinners. Reports had a similar episode a few years ago and received blood transfusions.        Reason for Admission  Rectal Bleeding     Admission Date  7/13/2018    CODE STATUS  DNAR/DNI    HPI & HOSPITAL COURSE  This is a 67 y.o. female here with bright red blood per rectum.  She was hemodynamically stable.  She has a history of pan diverticulosis with associated bleed and underwent a colonoscopy and endoscopy two years ago.  She is on no blood thinners.  She was seen by GI and her H/H increased.  She had no abdominal pain or further bleeding while hospitalized.  She was cleared by GI for discharge, they are planning an outpatient follow up with capsule endoscopy.  Patient's  (her care giver, as patient is demented) agrees to return to the ER if any further bleeding in the interim.  If she has recurrent episodes she will likely require a tagged rbc scan if the volume is sufficient.    Therefore, she is discharged in fair and stable condition to home with close outpatient follow-up.    The patient recovered much more quickly than anticipated on admission.    Discharge Date  7/14/18    FOLLOW UP ITEMS POST DISCHARGE  GI    DISCHARGE DIAGNOSES  Principal Problem (Resolved):    Hematochezia POA: Unknown  Active Problems:    Hypothyroid POA: Yes    Dementia POA: Unknown  Resolved Problems:    Dehydration POA: Yes    Acute blood loss anemia POA: Yes    Hypotensive episode POA: Unknown      FOLLOW UP  No future appointments.  Wily Garcia M.D.  [address]  [place] [address]  [phone]    Schedule an appointment as soon as possible for a visit in 1 week      Guru Castañeda M.D.  [address]  [place] [address]  [phone]    In 3 weeks        MEDICATIONS ON  DISCHARGE     Medication List      CONTINUE taking these medications      Instructions   CALCIUM-MAGNESIUM PO   Take 1 Tab by mouth every evening. 1000/500mg  Dose:  1 Tab     CO Q 10 PO   Take 200 mg by mouth every morning.  Dose:  200 mg     donepezil 10 MG tablet  Commonly known as:  ARICEPT   Take 1 Tab by mouth every evening.  Dose:  10 mg     Fish Oil 1200 MG Caps   Take 1 Cap by mouth every day.  Dose:  1 Cap     levothyroxine 88 MCG Tabs  Commonly known as:  SYNTHROID   Take 88 mcg by mouth Every morning on an empty stomach.  Dose:  88 mcg     liothyronine 5 MCG Tabs  Commonly known as:  CYTOMEL   Take 5 mcg by mouth every day.  Dose:  5 mcg     Melatonin 3 MG Caps   Take 3 mg by mouth every evening.  Dose:  3 mg     NON SPECIFIED   Take 1 Cap by mouth every day. Blueberry extract  Dose:  1 Cap     Potassium 99 MG Tabs   Take  by mouth every evening.     PROBIOTIC DAILY PO   Take 1 Cap by mouth every morning.  Dose:  1 Cap     Vitamin A & D 5000-400 units Caps   Take 1 Tab by mouth every evening.  Dose:  1 Tab     VITAMIN B COMPLEX PO   Take 1 Tab by mouth every evening.  Dose:  1 Tab     VITAMIN K PO   Take 100 mg by mouth every morning.  Dose:  100 mg        STOP taking these medications    Vitamin D 2000 units Caps            Allergies  Allergies   Allergen Reactions   • Nkda [No Known Drug Allergy]        DIET  Orders Placed This Encounter   Procedures   • Diet Order Clear Liquids - No Red Foods     Standing Status:   Standing     Number of Occurrences:   1     Order Specific Question:   Diet:     Answer:   Clear Liquids - No Red Foods [12]       ACTIVITY  As tolerated.    CONSULTATIONS  GI consultants    PROCEDURES  CTA ABDOMEN PELVIS W & W/O POST PROCESS   Final Result      1.  No extravasation of contrast into the bowel to determine location of gastrointestinal bleeding.      2.  Pandiverticulosis.      3.  Atherosclerotic disease      4.  Status post cholecystectomy.            LABORATORY  Lab  Results   Component Value Date    SODIUM 139 07/14/2018    POTASSIUM 3.8 07/14/2018    CHLORIDE 110 07/14/2018    CO2 21 07/14/2018    GLUCOSE 80 07/14/2018    BUN 13 07/14/2018    CREATININE 0.59 07/14/2018    CREATININE 0.8 04/24/2009        Lab Results   Component Value Date    WBC 8.4 07/14/2018    HEMOGLOBIN 12.1 07/14/2018    HEMATOCRIT 37.0 07/14/2018    PLATELETCT 231 07/14/2018        Total time of the discharge process exceeds 33 minutes.

## 2018-07-14 NOTE — PROGRESS NOTES
Discharge instructions, medications (including next dose time and date), and follow up appt discussed with pt.  Pt denies questions and verbalizes understanding.  Tele box and PIV removed.  Awaiting transport.

## 2018-07-15 ENCOUNTER — PATIENT OUTREACH (OUTPATIENT)
Dept: HEALTH INFORMATION MANAGEMENT | Facility: OTHER | Age: 68
End: 2018-07-15

## 2018-11-12 ENCOUNTER — APPOINTMENT (OUTPATIENT)
Dept: RADIOLOGY | Facility: MEDICAL CENTER | Age: 68
End: 2018-11-12
Attending: OTOLARYNGOLOGY
Payer: COMMERCIAL

## 2019-07-25 ENCOUNTER — HOSPITAL ENCOUNTER (INPATIENT)
Facility: MEDICAL CENTER | Age: 69
LOS: 4 days | DRG: 392 | End: 2019-07-29
Attending: EMERGENCY MEDICINE | Admitting: INTERNAL MEDICINE
Payer: COMMERCIAL

## 2019-07-25 ENCOUNTER — APPOINTMENT (OUTPATIENT)
Dept: RADIOLOGY | Facility: MEDICAL CENTER | Age: 69
DRG: 392 | End: 2019-07-25
Attending: EMERGENCY MEDICINE
Payer: COMMERCIAL

## 2019-07-25 DIAGNOSIS — D64.9 ANEMIA, UNSPECIFIED TYPE: ICD-10-CM

## 2019-07-25 DIAGNOSIS — K92.2 LOWER GI BLEED: ICD-10-CM

## 2019-07-25 PROBLEM — K62.5 RECTAL BLEEDING: Status: ACTIVE | Noted: 2019-07-25

## 2019-07-25 PROBLEM — Z66 DNR (DO NOT RESUSCITATE): Status: ACTIVE | Noted: 2019-07-25

## 2019-07-25 LAB
ABO GROUP BLD: NORMAL
ALBUMIN SERPL BCP-MCNC: 3.6 G/DL (ref 3.2–4.9)
ALBUMIN/GLOB SERPL: 1.3 G/DL
ALP SERPL-CCNC: 53 U/L (ref 30–99)
ALT SERPL-CCNC: 15 U/L (ref 2–50)
ANION GAP SERPL CALC-SCNC: 10 MMOL/L (ref 0–11.9)
APTT PPP: 24.9 SEC (ref 24.7–36)
AST SERPL-CCNC: 14 U/L (ref 12–45)
BARCODED ABORH UBTYP: 5100
BARCODED PRD CODE UBPRD: NORMAL
BARCODED UNIT NUM UBUNT: NORMAL
BASOPHILS # BLD AUTO: 0.3 % (ref 0–1.8)
BASOPHILS # BLD: 0.03 K/UL (ref 0–0.12)
BILIRUB SERPL-MCNC: 0.2 MG/DL (ref 0.1–1.5)
BLD GP AB SCN SERPL QL: NORMAL
BUN SERPL-MCNC: 29 MG/DL (ref 8–22)
CALCIUM SERPL-MCNC: 8.5 MG/DL (ref 8.5–10.5)
CHLORIDE SERPL-SCNC: 111 MMOL/L (ref 96–112)
CO2 SERPL-SCNC: 18 MMOL/L (ref 20–33)
COMPONENT R 8504R: NORMAL
CREAT SERPL-MCNC: 0.65 MG/DL (ref 0.5–1.4)
EOSINOPHIL # BLD AUTO: 0.08 K/UL (ref 0–0.51)
EOSINOPHIL NFR BLD: 0.8 % (ref 0–6.9)
ERYTHROCYTE [DISTWIDTH] IN BLOOD BY AUTOMATED COUNT: 54 FL (ref 35.9–50)
GLOBULIN SER CALC-MCNC: 2.8 G/DL (ref 1.9–3.5)
GLUCOSE SERPL-MCNC: 94 MG/DL (ref 65–99)
HCT VFR BLD AUTO: 30.1 % (ref 37–47)
HCT VFR BLD AUTO: 32.8 % (ref 37–47)
HCT VFR BLD AUTO: 33.1 % (ref 37–47)
HGB BLD-MCNC: 10.4 G/DL (ref 12–16)
HGB BLD-MCNC: 10.7 G/DL (ref 12–16)
HGB BLD-MCNC: 9.8 G/DL (ref 12–16)
IMM GRANULOCYTES # BLD AUTO: 0.05 K/UL (ref 0–0.11)
IMM GRANULOCYTES NFR BLD AUTO: 0.5 % (ref 0–0.9)
INR PPP: 0.97 (ref 0.87–1.13)
LYMPHOCYTES # BLD AUTO: 2.91 K/UL (ref 1–4.8)
LYMPHOCYTES NFR BLD: 27.5 % (ref 22–41)
MAGNESIUM SERPL-MCNC: 2 MG/DL (ref 1.5–2.5)
MCH RBC QN AUTO: 32.4 PG (ref 27–33)
MCHC RBC AUTO-ENTMCNC: 32.6 G/DL (ref 33.6–35)
MCV RBC AUTO: 99.4 FL (ref 81.4–97.8)
MONOCYTES # BLD AUTO: 0.91 K/UL (ref 0–0.85)
MONOCYTES NFR BLD AUTO: 8.6 % (ref 0–13.4)
NEUTROPHILS # BLD AUTO: 6.59 K/UL (ref 2–7.15)
NEUTROPHILS NFR BLD: 62.3 % (ref 44–72)
NRBC # BLD AUTO: 0 K/UL
NRBC BLD-RTO: 0 /100 WBC
PHOSPHATE SERPL-MCNC: 3 MG/DL (ref 2.5–4.5)
PLATELET # BLD AUTO: 277 K/UL (ref 164–446)
PMV BLD AUTO: 10.2 FL (ref 9–12.9)
POTASSIUM SERPL-SCNC: 3.9 MMOL/L (ref 3.6–5.5)
PRODUCT TYPE UPROD: NORMAL
PROT SERPL-MCNC: 6.4 G/DL (ref 6–8.2)
PROTHROMBIN TIME: 13.1 SEC (ref 12–14.6)
RBC # BLD AUTO: 3.3 M/UL (ref 4.2–5.4)
RH BLD: NORMAL
SODIUM SERPL-SCNC: 139 MMOL/L (ref 135–145)
TSH SERPL DL<=0.005 MIU/L-ACNC: 2.37 UIU/ML (ref 0.38–5.33)
UNIT STATUS USTAT: NORMAL
WBC # BLD AUTO: 10.6 K/UL (ref 4.8–10.8)

## 2019-07-25 PROCEDURE — 84100 ASSAY OF PHOSPHORUS: CPT

## 2019-07-25 PROCEDURE — 700117 HCHG RX CONTRAST REV CODE 255: Performed by: EMERGENCY MEDICINE

## 2019-07-25 PROCEDURE — 80053 COMPREHEN METABOLIC PANEL: CPT

## 2019-07-25 PROCEDURE — 700102 HCHG RX REV CODE 250 W/ 637 OVERRIDE(OP): Performed by: INTERNAL MEDICINE

## 2019-07-25 PROCEDURE — 99223 1ST HOSP IP/OBS HIGH 75: CPT | Performed by: INTERNAL MEDICINE

## 2019-07-25 PROCEDURE — 36415 COLL VENOUS BLD VENIPUNCTURE: CPT

## 2019-07-25 PROCEDURE — 700105 HCHG RX REV CODE 258: Performed by: EMERGENCY MEDICINE

## 2019-07-25 PROCEDURE — 700111 HCHG RX REV CODE 636 W/ 250 OVERRIDE (IP): Performed by: INTERNAL MEDICINE

## 2019-07-25 PROCEDURE — 85610 PROTHROMBIN TIME: CPT

## 2019-07-25 PROCEDURE — 99285 EMERGENCY DEPT VISIT HI MDM: CPT

## 2019-07-25 PROCEDURE — 86901 BLOOD TYPING SEROLOGIC RH(D): CPT

## 2019-07-25 PROCEDURE — C9113 INJ PANTOPRAZOLE SODIUM, VIA: HCPCS | Performed by: INTERNAL MEDICINE

## 2019-07-25 PROCEDURE — 86900 BLOOD TYPING SEROLOGIC ABO: CPT

## 2019-07-25 PROCEDURE — 85730 THROMBOPLASTIN TIME PARTIAL: CPT

## 2019-07-25 PROCEDURE — 86850 RBC ANTIBODY SCREEN: CPT

## 2019-07-25 PROCEDURE — 700105 HCHG RX REV CODE 258: Performed by: INTERNAL MEDICINE

## 2019-07-25 PROCEDURE — 84443 ASSAY THYROID STIM HORMONE: CPT

## 2019-07-25 PROCEDURE — 74174 CTA ABD&PLVS W/CONTRAST: CPT

## 2019-07-25 PROCEDURE — 83735 ASSAY OF MAGNESIUM: CPT

## 2019-07-25 PROCEDURE — 85025 COMPLETE CBC W/AUTO DIFF WBC: CPT

## 2019-07-25 PROCEDURE — A9270 NON-COVERED ITEM OR SERVICE: HCPCS | Performed by: INTERNAL MEDICINE

## 2019-07-25 PROCEDURE — 85018 HEMOGLOBIN: CPT | Mod: 91

## 2019-07-25 PROCEDURE — 85014 HEMATOCRIT: CPT

## 2019-07-25 PROCEDURE — 770020 HCHG ROOM/CARE - TELE (206)

## 2019-07-25 RX ORDER — ACETAMINOPHEN 325 MG/1
650 TABLET ORAL EVERY 6 HOURS PRN
Status: DISCONTINUED | OUTPATIENT
Start: 2019-07-25 | End: 2019-07-29 | Stop reason: HOSPADM

## 2019-07-25 RX ORDER — ENALAPRILAT 1.25 MG/ML
1.25 INJECTION INTRAVENOUS EVERY 6 HOURS PRN
Status: DISCONTINUED | OUTPATIENT
Start: 2019-07-25 | End: 2019-07-29 | Stop reason: HOSPADM

## 2019-07-25 RX ORDER — PHENOL 1.4 %
10 AEROSOL, SPRAY (ML) MUCOUS MEMBRANE EVERY EVENING
Status: DISCONTINUED | OUTPATIENT
Start: 2019-07-25 | End: 2019-07-25

## 2019-07-25 RX ORDER — SODIUM CHLORIDE, SODIUM LACTATE, POTASSIUM CHLORIDE, CALCIUM CHLORIDE 600; 310; 30; 20 MG/100ML; MG/100ML; MG/100ML; MG/100ML
INJECTION, SOLUTION INTRAVENOUS CONTINUOUS
Status: DISCONTINUED | OUTPATIENT
Start: 2019-07-25 | End: 2019-07-27

## 2019-07-25 RX ORDER — ONDANSETRON 4 MG/1
4 TABLET, ORALLY DISINTEGRATING ORAL EVERY 4 HOURS PRN
Status: DISCONTINUED | OUTPATIENT
Start: 2019-07-25 | End: 2019-07-29 | Stop reason: HOSPADM

## 2019-07-25 RX ORDER — SODIUM CHLORIDE 9 MG/ML
1000 INJECTION, SOLUTION INTRAVENOUS ONCE
Status: COMPLETED | OUTPATIENT
Start: 2019-07-25 | End: 2019-07-25

## 2019-07-25 RX ORDER — PANTOPRAZOLE SODIUM 40 MG/10ML
80 INJECTION, POWDER, LYOPHILIZED, FOR SOLUTION INTRAVENOUS ONCE
Status: COMPLETED | OUTPATIENT
Start: 2019-07-25 | End: 2019-07-25

## 2019-07-25 RX ORDER — LEVOTHYROXINE SODIUM 88 UG/1
88 TABLET ORAL
Status: DISCONTINUED | OUTPATIENT
Start: 2019-07-25 | End: 2019-07-29 | Stop reason: HOSPADM

## 2019-07-25 RX ORDER — DONEPEZIL HYDROCHLORIDE 5 MG/1
10 TABLET, FILM COATED ORAL EVERY EVENING
Status: DISCONTINUED | OUTPATIENT
Start: 2019-07-25 | End: 2019-07-29 | Stop reason: HOSPADM

## 2019-07-25 RX ORDER — ONDANSETRON 2 MG/ML
4 INJECTION INTRAMUSCULAR; INTRAVENOUS EVERY 4 HOURS PRN
Status: DISCONTINUED | OUTPATIENT
Start: 2019-07-25 | End: 2019-07-29 | Stop reason: HOSPADM

## 2019-07-25 RX ORDER — LIOTHYRONINE SODIUM 5 UG/1
5 TABLET ORAL DAILY
Status: DISCONTINUED | OUTPATIENT
Start: 2019-07-25 | End: 2019-07-29 | Stop reason: HOSPADM

## 2019-07-25 RX ADMIN — SODIUM CHLORIDE 1000 ML: 9 INJECTION, SOLUTION INTRAVENOUS at 06:53

## 2019-07-25 RX ADMIN — LIOTHYRONINE SODIUM 5 MCG: 5 TABLET ORAL at 13:39

## 2019-07-25 RX ADMIN — DONEPEZIL HYDROCHLORIDE 10 MG: 5 TABLET, FILM COATED ORAL at 17:36

## 2019-07-25 RX ADMIN — IOHEXOL 100 ML: 350 INJECTION, SOLUTION INTRAVENOUS at 16:49

## 2019-07-25 RX ADMIN — SODIUM CHLORIDE 8 MG/HR: 9 INJECTION, SOLUTION INTRAVENOUS at 15:19

## 2019-07-25 RX ADMIN — SODIUM CHLORIDE, POTASSIUM CHLORIDE, SODIUM LACTATE AND CALCIUM CHLORIDE: 600; 310; 30; 20 INJECTION, SOLUTION INTRAVENOUS at 15:19

## 2019-07-25 RX ADMIN — PANTOPRAZOLE SODIUM 80 MG: 40 INJECTION, POWDER, LYOPHILIZED, FOR SOLUTION INTRAVENOUS at 13:42

## 2019-07-25 ASSESSMENT — ENCOUNTER SYMPTOMS
NECK PAIN: 0
DIAPHORESIS: 0
SENSORY CHANGE: 0
HEMOPTYSIS: 0
FALLS: 0
WEAKNESS: 1
HEARTBURN: 0
ABDOMINAL PAIN: 0
TREMORS: 0
DEPRESSION: 0
WEIGHT LOSS: 0
SEIZURES: 0
FOCAL WEAKNESS: 0
MEMORY LOSS: 1
CHILLS: 0
PALPITATIONS: 0
CONSTIPATION: 0
PND: 0
DOUBLE VISION: 0
BLOOD IN STOOL: 1
SPUTUM PRODUCTION: 0
ORTHOPNEA: 0
SPEECH CHANGE: 0
SHORTNESS OF BREATH: 0
COUGH: 0
HEADACHES: 0
FEVER: 0
NAUSEA: 0
LOSS OF CONSCIOUSNESS: 0
BACK PAIN: 0
INSOMNIA: 0
DIARRHEA: 0
TINGLING: 0
DIZZINESS: 1
MYALGIAS: 0
BLURRED VISION: 0
CLAUDICATION: 0
VOMITING: 0
WHEEZING: 0
FLANK PAIN: 0

## 2019-07-25 ASSESSMENT — COGNITIVE AND FUNCTIONAL STATUS - GENERAL
SUGGESTED CMS G CODE MODIFIER DAILY ACTIVITY: CJ
CLIMB 3 TO 5 STEPS WITH RAILING: A LITTLE
MOVING FROM LYING ON BACK TO SITTING ON SIDE OF FLAT BED: A LITTLE
DRESSING REGULAR UPPER BODY CLOTHING: A LITTLE
STANDING UP FROM CHAIR USING ARMS: A LITTLE
DAILY ACTIVITIY SCORE: 20
WALKING IN HOSPITAL ROOM: A LITTLE
TOILETING: A LITTLE
MOBILITY SCORE: 19
DRESSING REGULAR LOWER BODY CLOTHING: A LITTLE
HELP NEEDED FOR BATHING: A LITTLE
MOVING TO AND FROM BED TO CHAIR: A LITTLE
SUGGESTED CMS G CODE MODIFIER MOBILITY: CK

## 2019-07-25 ASSESSMENT — COPD QUESTIONNAIRES
HAVE YOU SMOKED AT LEAST 100 CIGARETTES IN YOUR ENTIRE LIFE: NO/DON'T KNOW
DURING THE PAST 4 WEEKS HOW MUCH DID YOU FEEL SHORT OF BREATH: NONE/LITTLE OF THE TIME
DO YOU EVER COUGH UP ANY MUCUS OR PHLEGM?: NO/ONLY WITH OCCASIONAL COLDS OR INFECTIONS
COPD SCREENING SCORE: 2

## 2019-07-25 ASSESSMENT — LIFESTYLE VARIABLES: EVER_SMOKED: NEVER

## 2019-07-25 NOTE — ED PROVIDER NOTES
ED Provider Note    CHIEF COMPLAINT  Chief Complaint   Patient presents with   • Rectal Bleeding     reports she might have had some blood yesterday but woke up this morning with rectal bleeding and bright red blood in her stool       HPI  Jayshree Reinoso is a 68 y.o. female who presents rectal bleeding, without pain.  Initial episode yesterday was mild, much more severe today.  2 years ago patient had similar bleed thought to be secondary to diverticular source requiring blood transfusion.  Patient was admitted 1 year ago for similar bleed however did not require transfusion.   states they did not follow through with pill endoscopy after the patient improved.  She does not take blood thinners.  She denies abdominal pain.  No shortness of breath.  Her  states she appears pale and the patient states she feels lightheaded    REVIEW OF SYSTEMS  Constitutional: Dizziness, no fever  Respiratory: No shortness of breath  Cardiac: No chest pain or syncope  Gastrointestinal: Rectal bleeding  Musculoskeletal: No acute back pain  Neurologic: No headache.  Early onset Alzheimer's dementia  Genitourinary: No hematuria       All other systems are negative.     PAST MEDICAL HISTORY  Past Medical History:   Diagnosis Date   • Alzheimer's disease    • Alzheimer's disease    • Anesthesia     PONV   • Arthritis     RA and osteo   • Backpain    • Cancer (HCC) 2010    skin melanoma   • Hypothyroid    • Other specified symptom associated with female genital organs     vaginal mesh removed   • Pain     bilateral knee   • Renal disorder     renal fatigue   • Unspecified disorder of thyroid    • Unspecified urinary incontinence        FAMILY HISTORY  No family history on file.    SOCIAL HISTORY  Social History     Social History   • Marital status:      Spouse name: N/A   • Number of children: N/A   • Years of education: N/A     Social History Main Topics   • Smoking status: Never Smoker   • Smokeless tobacco: Never Used  "  • Alcohol use No   • Drug use: No   • Sexual activity: Not Currently     Other Topics Concern   • Not on file     Social History Narrative   • No narrative on file       SURGICAL HISTORY  Past Surgical History:   Procedure Laterality Date   • GASTROSCOPY-ENDO  8/11/2016    Procedure: GASTROSCOPY-ENDO;  Surgeon: Ruchi Akers M.D.;  Location: SURGERY [facility];  Service:    • KNEE ARTHROPLASTY TOTAL Right 7/11/2016    Procedure: KNEE ARTHROPLASTY TOTAL;  Surgeon: Rayray Valentine M.D.;  Location: SURGERY [facility];  Service:    • LUMBAR FUSION POSTERIOR  2/27/2013    Performed by Sonny Flroes M.D. at SURGERY [facility]   • LUMBAR LAMINECTOMY DISKECTOMY  2/27/2013    Performed by Sonny Flores M.D. at SURGERY [facility]   • VAGINECTOMY  7/25/2011    Performed by MICHELLE BURT at SURGERY SAME DAY [facility]   • LAPAROSCOPY  5/7/2009    Performed by RODY SAENZ at SURGERY [facility]   • FEMORAL HERNIA REPAIR  5/7/2009    Performed by RODY SAENZ at SURGERY [facility]   • INGUINAL HERNIA REPAIR  12/18/2008    Performed by RODY SAENZ at SURGERY SAME DAY [facility]   • RECTUS REPAIR  10/9/08    Performed by EFREN REYNOLDS at SURGERY SAME DAY [facility]   • SCLERAL BUCKLING  7/9/08    Performed by JORGE DAVENPORT at SURGERY SAME DAY [facility]   • GYN SURGERY     • GYN SURGERY      vaginal mesh removed   • GYN SURGERY      hysterectomy   • HERNIA REPAIR         CURRENT MEDICATIONS  Home Medications    **Home medications have not yet been reviewed for this encounter**         ALLERGIES  Allergies   Allergen Reactions   • Nkda [No Known Drug Allergy]        PHYSICAL EXAM  VITAL SIGNS: BP (!) 96/67   Pulse 76   Temp 36.2 °C (97.2 °F) (Temporal)   Resp 18   Ht 1.626 m (5' 4\")   Wt 72.6 kg (160 lb)   SpO2 96%   BMI 27.46 kg/m²   Constitutional: Well-nourished, slightly pale, no distress  ENT: Nares clear, mucous membranes tacky.  Eyes:  Conjunctiva " normal, No discharge.    Lymphatic: No adenopathy.   Cardiovascular: Normal heart rate, normal rhythm.   Pulmonary: No wheezing, no rales  Gastrointestinal: Soft, nontender, no guarding.  Rectal exam deferred after discussion with patient and her , patient just had a large bloody bowel movement prior to arrival  Skin: Warm, Dry, No jaundice.  Mild pallor  Musculoskeletal:  No CVA tenderness.   Neurologic:  Normal motor and sensory function, No focal deficits noted.   Psychiatric: Flat affect, no agitation.    RADIOLOGY/PROCEDURES/Labs  Results for orders placed or performed during the hospital encounter of 07/25/19   CBC WITH DIFFERENTIAL   Result Value Ref Range    WBC 10.6 4.8 - 10.8 K/uL    RBC 3.30 (L) 4.20 - 5.40 M/uL    Hemoglobin 10.7 (L) 12.0 - 16.0 g/dL    Hematocrit 32.8 (L) 37.0 - 47.0 %    MCV 99.4 (H) 81.4 - 97.8 fL    MCH 32.4 27.0 - 33.0 pg    MCHC 32.6 (L) 33.6 - 35.0 g/dL    RDW 54.0 (H) 35.9 - 50.0 fL    Platelet Count 277 164 - 446 K/uL    MPV 10.2 9.0 - 12.9 fL    Neutrophils-Polys 62.30 44.00 - 72.00 %    Lymphocytes 27.50 22.00 - 41.00 %    Monocytes 8.60 0.00 - 13.40 %    Eosinophils 0.80 0.00 - 6.90 %    Basophils 0.30 0.00 - 1.80 %    Immature Granulocytes 0.50 0.00 - 0.90 %    Nucleated RBC 0.00 /100 WBC    Neutrophils (Absolute) 6.59 2.00 - 7.15 K/uL    Lymphs (Absolute) 2.91 1.00 - 4.80 K/uL    Monos (Absolute) 0.91 (H) 0.00 - 0.85 K/uL    Eos (Absolute) 0.08 0.00 - 0.51 K/uL    Baso (Absolute) 0.03 0.00 - 0.12 K/uL    Immature Granulocytes (abs) 0.05 0.00 - 0.11 K/uL    NRBC (Absolute) 0.00 K/uL   COMP METABOLIC PANEL   Result Value Ref Range    Sodium 139 135 - 145 mmol/L    Potassium 3.9 3.6 - 5.5 mmol/L    Chloride 111 96 - 112 mmol/L    Co2 18 (L) 20 - 33 mmol/L    Anion Gap 10.0 0.0 - 11.9    Glucose 94 65 - 99 mg/dL    Bun 29 (H) 8 - 22 mg/dL    Creatinine 0.65 0.50 - 1.40 mg/dL    Calcium 8.5 8.5 - 10.5 mg/dL    AST(SGOT) 14 12 - 45 U/L    ALT(SGPT) 15 2 - 50 U/L     Alkaline Phosphatase 53 30 - 99 U/L    Total Bilirubin 0.2 0.1 - 1.5 mg/dL    Albumin 3.6 3.2 - 4.9 g/dL    Total Protein 6.4 6.0 - 8.2 g/dL    Globulin 2.8 1.9 - 3.5 g/dL    A-G Ratio 1.3 g/dL   APTT   Result Value Ref Range    APTT 24.9 24.7 - 36.0 sec   PROTHROMBIN TIME (INR)   Result Value Ref Range    PT 13.1 12.0 - 14.6 sec    INR 0.97 0.87 - 1.13   COD (ADULT)   Result Value Ref Range    ABO Grouping Only O     Rh Grouping Only POS     Antibody Screen-Cod NEG    ESTIMATED GFR   Result Value Ref Range    GFR If African American >60 >60 mL/min/1.73 m 2    GFR If Non African American >60 >60 mL/min/1.73 m 2   TSH   Result Value Ref Range    TSH 2.370 0.380 - 5.330 uIU/mL   PHOSPHORUS   Result Value Ref Range    Phosphorus 3.0 2.5 - 4.5 mg/dL   MAGNESIUM   Result Value Ref Range    Magnesium 2.0 1.5 - 2.5 mg/dL     CTA ABDOMEN PELVIS W & W/O POST PROCESS    (Results Pending)         COURSE & MEDICAL DECISION MAKING  Pertinent Labs & Imaging studies reviewed. (See chart for details)  Patient is anemic, symptomatic as well complaining of dizziness and her  think she looks pale.  With maroon blood per rectum, differential includes both upper and lower GI bleeding.  Lack of melena suggest lower GI bleed as well as history of diverticular bleed however patient does have elevated BUN to creatinine ratio.  After discussion with admitting hospitalist, CT scan with IV contrast has been ordered.  Patient admitted for ongoing work-up and treatment.    FINAL IMPRESSION  1. Lower GI bleed    2. Anemia, unspecified type            Electronically signed by: Reinaldo Jackson, 7/25/2019 6:56 AM

## 2019-07-25 NOTE — ASSESSMENT & PLAN NOTE
Painless, gush of bleeding without preceding melena  Patient with prior history of peptic ulcer disease and pandiverticulosis  Painless bleeding, BRBPR concerning for diverticular bleed  Azotemia concerning for upper GI bleed    At this time patient will be admitted to the hospital        At this time we will provide the patient with 80 mg of IV Protonix, subsequently maintain on 8 mg/h Protonix infusion   type and screen, TEG with platelet mapping  Gentle IV fluid hydration      7/26H&H down trending from 10-8  Monitor Hb / Restrictive transfusion strategy  Maintain 2 large-bore IV access at all times  Need for endoscopic evaluation per gastroenterology team  Consult GI, seen by Dr. Akers in the past    CT abdomen pelvis with diverticulosis no bleed identified    If orthostatic, de-escalation and vital signs, transfer to the intensive care unit    CODE STATUS discussed with patient, spouse at bedside.  DNR/DNI.    7/27 status post EGD with possible duodenal ulcer, status post biopsy  Appreciate GI support  N.p.o. for colonoscopy today  Continued blood loss status post transfusion, hemodynamically stable at this time  Continue close monitoring of H&H  PPI twice daily    7/28 Plan as above

## 2019-07-25 NOTE — ED TRIAGE NOTES
"Jayshree Reinoso   68 y.o. female   Chief Complaint   Patient presents with   • Rectal Bleeding     reports she might have had some blood yesterday but woke up this morning with rectal bleeding and bright red blood in her stool      Pt to triage in  with family member for above complaint. Per family, pt had a similar episode like this before requiring blood transfusions. Pt says she does feel light headed but denies any pain.      Pt is alert and oriented, history of AD, speaking in full sentences, follows commands and responds appropriately to questions. Resp are even and unlabored.   Pt placed in lobby. Pt educated on triage process. Pt encouraged to alert staff for any changes.    BP (!) 96/67   Pulse 79   Temp 36.2 °C (97.2 °F) (Temporal)   Resp 18   Ht 1.626 m (5' 4\")   Wt 72.6 kg (160 lb)   SpO2 96%   BMI 27.46 kg/m²     "

## 2019-07-25 NOTE — ED NOTES
Patient does not wish to have IV placed in A/C for CT.  Patient discussing with spouse at bedside.

## 2019-07-25 NOTE — ED NOTES
"Patient assisted to bedside commode, states \"I feel like I need to, but can't.  Small amt of dark red blood in tissue.   Assisted back into bed.  "

## 2019-07-25 NOTE — ED NOTES
Pt presents to the ED with complaints of rectal bleeding. Pt states that blood was both in the stool and on the floor. Pt states blood was bright red. Pt has hx of diverticulosis. Pt denies any pain but does report lightheadedness.Pt also reports hx of GI bleeding that required transfusion in the past

## 2019-07-25 NOTE — ED NOTES
Med Rec completed per patient and family   Allergies reviewed  No ORAL antibiotics in last 14 days

## 2019-07-25 NOTE — ED NOTES
PIV removed per Pt request. Pt educated about importance for PIV placement. Pt verbalized understanding.

## 2019-07-26 LAB
ALBUMIN SERPL BCP-MCNC: 3.6 G/DL (ref 3.2–4.9)
ALBUMIN/GLOB SERPL: 1.3 G/DL
ALP SERPL-CCNC: 53 U/L (ref 30–99)
ALT SERPL-CCNC: 14 U/L (ref 2–50)
ANION GAP SERPL CALC-SCNC: 11 MMOL/L (ref 0–11.9)
AST SERPL-CCNC: 14 U/L (ref 12–45)
BILIRUB SERPL-MCNC: 0.4 MG/DL (ref 0.1–1.5)
BUN SERPL-MCNC: 22 MG/DL (ref 8–22)
CALCIUM SERPL-MCNC: 8.1 MG/DL (ref 8.5–10.5)
CFT BLD TEG: 4.1 MIN (ref 5–10)
CHLORIDE SERPL-SCNC: 111 MMOL/L (ref 96–112)
CLOT ANGLE BLD TEG: 74.3 DEGREES (ref 53–72)
CLOT LYSIS 30M P MA LENFR BLD TEG: 0 % (ref 0–8)
CO2 SERPL-SCNC: 18 MMOL/L (ref 20–33)
CREAT SERPL-MCNC: 0.56 MG/DL (ref 0.5–1.4)
CT.EXTRINSIC BLD ROTEM: 1.1 MIN (ref 1–3)
GLOBULIN SER CALC-MCNC: 2.7 G/DL (ref 1.9–3.5)
GLUCOSE SERPL-MCNC: 108 MG/DL (ref 65–99)
HCT VFR BLD AUTO: 25 % (ref 37–47)
HCT VFR BLD AUTO: 25.2 % (ref 37–47)
HCT VFR BLD AUTO: 27.2 % (ref 37–47)
HCT VFR BLD AUTO: 27.2 % (ref 37–47)
HCT VFR BLD AUTO: 29.1 % (ref 37–47)
HGB BLD-MCNC: 7.8 G/DL (ref 12–16)
HGB BLD-MCNC: 8.1 G/DL (ref 12–16)
HGB BLD-MCNC: 8.4 G/DL (ref 12–16)
HGB BLD-MCNC: 8.6 G/DL (ref 12–16)
HGB BLD-MCNC: 9.4 G/DL (ref 12–16)
MAGNESIUM SERPL-MCNC: 1.9 MG/DL (ref 1.5–2.5)
MCF BLD TEG: 73 MM (ref 50–70)
PA AA BLD-ACNC: 41 %
PA ADP BLD-ACNC: 14.9 %
PHOSPHATE SERPL-MCNC: 3 MG/DL (ref 2.5–4.5)
POTASSIUM SERPL-SCNC: 3.6 MMOL/L (ref 3.6–5.5)
PROT SERPL-MCNC: 6.3 G/DL (ref 6–8.2)
SODIUM SERPL-SCNC: 140 MMOL/L (ref 135–145)
TEG ALGORITHM TGALG: ABNORMAL

## 2019-07-26 PROCEDURE — 99233 SBSQ HOSP IP/OBS HIGH 50: CPT | Performed by: INTERNAL MEDICINE

## 2019-07-26 PROCEDURE — 85347 COAGULATION TIME ACTIVATED: CPT

## 2019-07-26 PROCEDURE — 85576 BLOOD PLATELET AGGREGATION: CPT | Mod: 91

## 2019-07-26 PROCEDURE — 85384 FIBRINOGEN ACTIVITY: CPT

## 2019-07-26 PROCEDURE — 700102 HCHG RX REV CODE 250 W/ 637 OVERRIDE(OP): Performed by: INTERNAL MEDICINE

## 2019-07-26 PROCEDURE — A9270 NON-COVERED ITEM OR SERVICE: HCPCS | Performed by: INTERNAL MEDICINE

## 2019-07-26 PROCEDURE — 80053 COMPREHEN METABOLIC PANEL: CPT

## 2019-07-26 PROCEDURE — 85018 HEMOGLOBIN: CPT | Mod: 91

## 2019-07-26 PROCEDURE — 0DB78ZX EXCISION OF STOMACH, PYLORUS, VIA NATURAL OR ARTIFICIAL OPENING ENDOSCOPIC, DIAGNOSTIC: ICD-10-PCS | Performed by: INTERNAL MEDICINE

## 2019-07-26 PROCEDURE — 84100 ASSAY OF PHOSPHORUS: CPT

## 2019-07-26 PROCEDURE — 99153 MOD SED SAME PHYS/QHP EA: CPT | Performed by: INTERNAL MEDICINE

## 2019-07-26 PROCEDURE — 700105 HCHG RX REV CODE 258: Performed by: INTERNAL MEDICINE

## 2019-07-26 PROCEDURE — 160203 HCHG ENDO MINUTES - 1ST 30 MINS LEVEL 4: Performed by: INTERNAL MEDICINE

## 2019-07-26 PROCEDURE — C9113 INJ PANTOPRAZOLE SODIUM, VIA: HCPCS | Performed by: INTERNAL MEDICINE

## 2019-07-26 PROCEDURE — 36415 COLL VENOUS BLD VENIPUNCTURE: CPT

## 2019-07-26 PROCEDURE — 700111 HCHG RX REV CODE 636 W/ 250 OVERRIDE (IP): Performed by: INTERNAL MEDICINE

## 2019-07-26 PROCEDURE — 83735 ASSAY OF MAGNESIUM: CPT

## 2019-07-26 PROCEDURE — 160048 HCHG OR STATISTICAL LEVEL 1-5: Performed by: INTERNAL MEDICINE

## 2019-07-26 PROCEDURE — 88312 SPECIAL STAINS GROUP 1: CPT

## 2019-07-26 PROCEDURE — 0DB98ZX EXCISION OF DUODENUM, VIA NATURAL OR ARTIFICIAL OPENING ENDOSCOPIC, DIAGNOSTIC: ICD-10-PCS | Performed by: INTERNAL MEDICINE

## 2019-07-26 PROCEDURE — 770020 HCHG ROOM/CARE - TELE (206)

## 2019-07-26 PROCEDURE — 85014 HEMATOCRIT: CPT

## 2019-07-26 PROCEDURE — 99152 MOD SED SAME PHYS/QHP 5/>YRS: CPT | Performed by: INTERNAL MEDICINE

## 2019-07-26 PROCEDURE — 85025 COMPLETE CBC W/AUTO DIFF WBC: CPT

## 2019-07-26 PROCEDURE — 500066 HCHG BITE BLOCK, ECT: Performed by: INTERNAL MEDICINE

## 2019-07-26 PROCEDURE — 88305 TISSUE EXAM BY PATHOLOGIST: CPT

## 2019-07-26 PROCEDURE — 80048 BASIC METABOLIC PNL TOTAL CA: CPT

## 2019-07-26 RX ORDER — SUCRALFATE 1 G/1
1 TABLET ORAL EVERY 6 HOURS
Status: DISCONTINUED | OUTPATIENT
Start: 2019-07-26 | End: 2019-07-29 | Stop reason: HOSPADM

## 2019-07-26 RX ORDER — PEG-3350, SODIUM SULFATE, SODIUM CHLORIDE, POTASSIUM CHLORIDE, SODIUM ASCORBATE AND ASCORBIC ACID 7.5-2.691G
100 KIT ORAL 2 TIMES DAILY
Status: COMPLETED | OUTPATIENT
Start: 2019-07-26 | End: 2019-07-27

## 2019-07-26 RX ORDER — MIDAZOLAM HYDROCHLORIDE 1 MG/ML
.5-2 INJECTION INTRAMUSCULAR; INTRAVENOUS PRN
Status: ACTIVE | OUTPATIENT
Start: 2019-07-26 | End: 2019-07-26

## 2019-07-26 RX ORDER — MIDAZOLAM HYDROCHLORIDE 1 MG/ML
INJECTION INTRAMUSCULAR; INTRAVENOUS
Status: DISCONTINUED | OUTPATIENT
Start: 2019-07-26 | End: 2019-07-26 | Stop reason: HOSPADM

## 2019-07-26 RX ORDER — SODIUM CHLORIDE 9 MG/ML
500 INJECTION, SOLUTION INTRAVENOUS
Status: ACTIVE | OUTPATIENT
Start: 2019-07-26 | End: 2019-07-26

## 2019-07-26 RX ADMIN — POLYETHYLENE GLYCOL 3350, SODIUM SULFATE, SODIUM CHLORIDE, POTASSIUM CHLORIDE, ASCORBIC ACID, SODIUM ASCORBATE 100 G: KIT at 18:23

## 2019-07-26 RX ADMIN — SODIUM CHLORIDE, POTASSIUM CHLORIDE, SODIUM LACTATE AND CALCIUM CHLORIDE: 600; 310; 30; 20 INJECTION, SOLUTION INTRAVENOUS at 03:01

## 2019-07-26 RX ADMIN — SODIUM CHLORIDE, POTASSIUM CHLORIDE, SODIUM LACTATE AND CALCIUM CHLORIDE: 600; 310; 30; 20 INJECTION, SOLUTION INTRAVENOUS at 16:11

## 2019-07-26 RX ADMIN — LEVOTHYROXINE SODIUM 88 MCG: 88 TABLET ORAL at 05:01

## 2019-07-26 RX ADMIN — SODIUM CHLORIDE 8 MG/HR: 9 INJECTION, SOLUTION INTRAVENOUS at 12:07

## 2019-07-26 RX ADMIN — LIOTHYRONINE SODIUM 5 MCG: 5 TABLET ORAL at 05:01

## 2019-07-26 RX ADMIN — DONEPEZIL HYDROCHLORIDE 10 MG: 5 TABLET, FILM COATED ORAL at 17:29

## 2019-07-26 RX ADMIN — SUCRALFATE 1 G: 1 TABLET ORAL at 17:29

## 2019-07-26 RX ADMIN — SODIUM CHLORIDE 8 MG/HR: 9 INJECTION, SOLUTION INTRAVENOUS at 02:58

## 2019-07-26 RX ADMIN — ACETAMINOPHEN 650 MG: 325 TABLET, FILM COATED ORAL at 08:52

## 2019-07-26 ASSESSMENT — ENCOUNTER SYMPTOMS
CHILLS: 0
HEADACHES: 0
WEAKNESS: 1
DIAPHORESIS: 0
FEVER: 0
DIZZINESS: 0
NAUSEA: 0
NERVOUS/ANXIOUS: 0
BACK PAIN: 0
FOCAL WEAKNESS: 0
MEMORY LOSS: 0
MYALGIAS: 0
PALPITATIONS: 0
COUGH: 0
ABDOMINAL PAIN: 0
SHORTNESS OF BREATH: 0
FLANK PAIN: 0

## 2019-07-26 NOTE — OR SURGEON
Immediate Post OP Note    PreOp Diagnosis: Acute GI bleed anemia, azotemia, melena, hematochezia    PostOp Diagnosis: Same    Procedure(s):  GASTROSCOPY - Wound Class: Clean Contaminated    Surgeon(s):  Guru Castañeda M.D.    Anesthesiologist/Type of Anesthesia:  No anesthesia staff entered./Sedation    Surgical Staff:  Endoscopy Technician: Shannan Angulo  Sedation/Monitoring Nurse: Briseyda Ayers R.N.    Specimens removed if any:  ID Type Source Tests Collected by Time Destination   A : duodenum inflammation Tissue Duodenum PATHOLOGY SPECIMEN Guru Castañeda M.D. 7/26/2019  4:43 PM    B : gastritis Tissue Gastric PATHOLOGY SPECIMEN Guru Castañeda M.D. 7/26/2019  4:45 PM        Dr. Castañeda  [facility]  [facility]  [phone]    EGD with:  Biopsy    Indication:  Acute GI bleed anemia, azotemia, melena, hematochezia    Sedation:  50mcg Fentanyl, 2mg Midazolam    Findings:   EGD    Esophagus     Unremarkable    Stomach     Hiatal hernia - 35-37cm     Atrophic appearance throughout     Antrum inflammation       Edema, erythema, erosions      Biopsied    Duodenum     Sweep      Inflammation and thickened folds      Suspicious for underlying ulcer      Biopsied     Distal duodenum unremarkable     No bleeding source identified     No blood seen    Plan:   Follow CBC     Transfuse to Hb >7     Stop PPI drip and change to twice daily PPI     Start sucralfate qid ac/hs     Clear diet, no red foods     NPO post midnight     Start colonoscopy prep     Colonoscopy tomorrow morning     TRBC STAT if patient has acute large volume rectal bleeding      7/26/2019 4:50 PM Guru Castañeda M.D.

## 2019-07-26 NOTE — PROGRESS NOTES
No acute bleeding on CTA   Hb stable   Uncertain if GI called by ER or not   Could be diverticular bleeding which has stopped  Monitor H&H  Clear liquid diet no reds  Follow closely - consider GI consultation if downtrending Hb tomorrow    Ria Villela M.D.  07/25/19  6:15 PM

## 2019-07-26 NOTE — PROGRESS NOTES
Notified Dr. Mauro that patient went to bathroom and had a large bright red bloody BM and got dizzy on the way back and had a near syncopal episode with O2 sat dropping to 78 on RA.   GI to take to Endoscopy for EGD.

## 2019-07-26 NOTE — PROGRESS NOTES
2 RN skin check complete.   Devices in place NA.  Skin assessed under devices NA.  Confirmed pressure ulcers found on NA.  New potential pressure ulcers noted on NA. Wound consult placed NO.  The following interventions in place Frequent repositioning, pillows to float extremities    Heels dry and boggy  Sacrum pink/blanching

## 2019-07-26 NOTE — CARE PLAN
Problem: Communication  Goal: The ability to communicate needs accurately and effectively will improve  Outcome: PROGRESSING AS EXPECTED  Call light within reach, patient is able to communicate needs    Problem: Safety  Goal: Will remain free from injury  Outcome: PROGRESSING AS EXPECTED  Patient educated on the need to call, bed locked and in lowest position, fall precautions in place     Problem: Knowledge Deficit  Goal: Knowledge of disease process/condition, treatment plan, diagnostic tests, and medications will improve  Outcome: PROGRESSING AS EXPECTED  Patient verbalizes understanding

## 2019-07-26 NOTE — PROGRESS NOTES
[facility] Medicine Daily Progress Note    Date of Service  7/26/2019    Chief Complaint  68 y.o. female admitted 7/25/2019 with history of gastritis and duodenitis as well as duodenal ulcer with prior GI bleed in 2016 as well as 2018, was seen by Dr. Enamorado in the past now with complaints of rectal bleeding/diarrhea and dizziness.  She is admitted for GI bleed with acute blood loss.  Blood pressure is borderline low.  GI has been consulted.    Hospital Course    68 y.o. female admitted 7/25/2019 with history of gastritis and duodenitis as well as duodenal ulcer with prior GI bleed in 2016 as well as 2018, was seen by Dr. Enamorado in the past now with complaints of rectal bleeding/diarrhea and dizziness.  She is admitted for GI bleed with acute blood loss.  Blood pressure is borderline low.  GI has been consulted.      Interval Problem Update  Patient had melena earlier today then had a large bloody BM this afternoon.  GI has been consulted, plan for EGD today  Stat H&H with hemoglobin of 8.1  BP stable  Patient reports headache and near syncopal episode after bloody BM today    Consultants/Specialty  GI    Code Status  Full    Disposition  TBD    Review of Systems  Review of Systems   Constitutional: Negative for chills, diaphoresis, fever and malaise/fatigue.   HENT: Negative for congestion.    Respiratory: Negative for cough and shortness of breath.    Cardiovascular: Negative for palpitations and leg swelling.   Gastrointestinal: Negative for abdominal pain and nausea.   Genitourinary: Negative for dysuria, flank pain and urgency.   Musculoskeletal: Negative for back pain, joint pain and myalgias.   Neurological: Positive for weakness. Negative for dizziness, focal weakness and headaches.   Psychiatric/Behavioral: Negative for memory loss. The patient is not nervous/anxious.         Physical Exam  Temp:  [36.3 °C (97.4 °F)-36.9 °C (98.5 °F)] 36.7 °C (98 °F)  Pulse:  [62-95] 83  Resp:  [16-20] 20  BP:  ()/(59-72) 103/69  SpO2:  [78 %-99 %] 99 %    Physical Exam    Fluids    Intake/Output Summary (Last 24 hours) at 07/26/19 1547  Last data filed at 07/26/19 0850   Gross per 24 hour   Intake          1751.67 ml   Output                0 ml   Net          1751.67 ml       Laboratory  Recent Labs      07/25/19   0700   07/26/19   0637  07/26/19   1152  07/26/19   1525   WBC  10.6   --    --    --    --    RBC  3.30*   --    --    --    --    HEMOGLOBIN  10.7*   < >  8.6*  8.4*  8.1*   HEMATOCRIT  32.8*   < >  27.2*  27.2*  25.2*   MCV  99.4*   --    --    --    --    MCH  32.4   --    --    --    --    MCHC  32.6*   --    --    --    --    RDW  54.0*   --    --    --    --    PLATELETCT  277   --    --    --    --    MPV  10.2   --    --    --    --     < > = values in this interval not displayed.     Recent Labs      07/25/19   0700  07/26/19   0015   SODIUM  139  140   POTASSIUM  3.9  3.6   CHLORIDE  111  111   CO2  18*  18*   GLUCOSE  94  108*   BUN  29*  22   CREATININE  0.65  0.56   CALCIUM  8.5  8.1*     Recent Labs      07/25/19   0700   APTT  24.9   INR  0.97               Imaging  CTA ABDOMEN PELVIS W & W/O POST PROCESS   Final Result         1. No active bleeding seen in the bowel.      2. Diverticulosis.           Assessment/Plan  Rectal bleeding- (present on admission)   Assessment & Plan    Painless, gush of bleeding without preceding melena  Patient with prior history of peptic ulcer disease and pandiverticulosis  Painless bleeding, BRBPR concerning for diverticular bleed  Azotemia concerning for upper GI bleed    At this time patient will be admitted to the hospital        At this time we will provide the patient with 80 mg of IV Protonix, subsequently maintain on 8 mg/h Protonix infusion   type and screen, TEG with platelet mapping  Gentle IV fluid hydration      7/26H&H down trending from 10-8  Monitor Hb / Restrictive transfusion strategy  Maintain 2 large-bore IV access at all times  Need for  endoscopic evaluation per gastroenterology team  Consult GI, seen by Dr. Akers in the past    CT abdomen pelvis with diverticulosis no bleed identified    If orthostatic, de-escalation and vital signs, transfer to the intensive care unit    CODE STATUS discussed with patient, spouse at bedside.  DNR/DNI.     DNR (do not resuscitate)- (present on admission)   Assessment & Plan    DNR/DNI     Dementia- (present on admission)   Assessment & Plan    Known history of  Avoid sedative, narcotics / BZDs as able     Hypothyroid- (present on admission)   Assessment & Plan    Continue at home supplementation of thyroid medications,  TSH wnl          VTE prophylaxis: SCD

## 2019-07-26 NOTE — PROGRESS NOTES
Bedside report received by day RN Cornelia. Patient sitting up in bed watching TV at this time. POC discussed, verbalized understanding. No immediate concerns for patient at this time. All safety measures in place.

## 2019-07-26 NOTE — PROGRESS NOTES
Report received from JESUS Rushing. Plan of care reviewed with patient, denies any questions. No needs voiced at this time. Call light within reach, bed locked and in lowest position.

## 2019-07-27 LAB
ANION GAP SERPL CALC-SCNC: 10 MMOL/L (ref 0–11.9)
BASOPHILS # BLD AUTO: 0.2 % (ref 0–1.8)
BASOPHILS # BLD: 0.03 K/UL (ref 0–0.12)
BUN SERPL-MCNC: 16 MG/DL (ref 8–22)
CALCIUM SERPL-MCNC: 7.5 MG/DL (ref 8.5–10.5)
CHLORIDE SERPL-SCNC: 113 MMOL/L (ref 96–112)
CO2 SERPL-SCNC: 19 MMOL/L (ref 20–33)
CREAT SERPL-MCNC: 0.56 MG/DL (ref 0.5–1.4)
EOSINOPHIL # BLD AUTO: 0.03 K/UL (ref 0–0.51)
EOSINOPHIL NFR BLD: 0.2 % (ref 0–6.9)
ERYTHROCYTE [DISTWIDTH] IN BLOOD BY AUTOMATED COUNT: 56.5 FL (ref 35.9–50)
GLUCOSE SERPL-MCNC: 106 MG/DL (ref 65–99)
HCT VFR BLD AUTO: 21.7 % (ref 37–47)
HCT VFR BLD AUTO: 23.7 % (ref 37–47)
HCT VFR BLD AUTO: 24 % (ref 37–47)
HCT VFR BLD AUTO: 25.4 % (ref 37–47)
HGB BLD-MCNC: 6.7 G/DL (ref 12–16)
HGB BLD-MCNC: 7.3 G/DL (ref 12–16)
HGB BLD-MCNC: 7.9 G/DL (ref 12–16)
HGB BLD-MCNC: 8.2 G/DL (ref 12–16)
IMM GRANULOCYTES # BLD AUTO: 0.06 K/UL (ref 0–0.11)
IMM GRANULOCYTES NFR BLD AUTO: 0.5 % (ref 0–0.9)
LYMPHOCYTES # BLD AUTO: 1.99 K/UL (ref 1–4.8)
LYMPHOCYTES NFR BLD: 16.3 % (ref 22–41)
MCH RBC QN AUTO: 31.8 PG (ref 27–33)
MCHC RBC AUTO-ENTMCNC: 30.9 G/DL (ref 33.6–35)
MCV RBC AUTO: 102.8 FL (ref 81.4–97.8)
MONOCYTES # BLD AUTO: 0.83 K/UL (ref 0–0.85)
MONOCYTES NFR BLD AUTO: 6.8 % (ref 0–13.4)
NEUTROPHILS # BLD AUTO: 9.24 K/UL (ref 2–7.15)
NEUTROPHILS NFR BLD: 76 % (ref 44–72)
NRBC # BLD AUTO: 0 K/UL
NRBC BLD-RTO: 0 /100 WBC
PLATELET # BLD AUTO: 242 K/UL (ref 164–446)
PMV BLD AUTO: 10.8 FL (ref 9–12.9)
POTASSIUM SERPL-SCNC: 3.4 MMOL/L (ref 3.6–5.5)
RBC # BLD AUTO: 2.11 M/UL (ref 4.2–5.4)
SODIUM SERPL-SCNC: 142 MMOL/L (ref 135–145)
WBC # BLD AUTO: 12.2 K/UL (ref 4.8–10.8)

## 2019-07-27 PROCEDURE — 160035 HCHG PACU - 1ST 60 MINS PHASE I: Performed by: INTERNAL MEDICINE

## 2019-07-27 PROCEDURE — 99152 MOD SED SAME PHYS/QHP 5/>YRS: CPT | Performed by: INTERNAL MEDICINE

## 2019-07-27 PROCEDURE — 160002 HCHG RECOVERY MINUTES (STAT): Performed by: INTERNAL MEDICINE

## 2019-07-27 PROCEDURE — 700105 HCHG RX REV CODE 258: Performed by: INTERNAL MEDICINE

## 2019-07-27 PROCEDURE — 700102 HCHG RX REV CODE 250 W/ 637 OVERRIDE(OP): Performed by: INTERNAL MEDICINE

## 2019-07-27 PROCEDURE — 30233N1 TRANSFUSION OF NONAUTOLOGOUS RED BLOOD CELLS INTO PERIPHERAL VEIN, PERCUTANEOUS APPROACH: ICD-10-PCS | Performed by: HOSPITALIST

## 2019-07-27 PROCEDURE — 85018 HEMOGLOBIN: CPT | Mod: 91

## 2019-07-27 PROCEDURE — 160208 HCHG ENDO MINUTES - EA ADDL 1 MIN LEVEL 4: Performed by: INTERNAL MEDICINE

## 2019-07-27 PROCEDURE — A9270 NON-COVERED ITEM OR SERVICE: HCPCS | Performed by: INTERNAL MEDICINE

## 2019-07-27 PROCEDURE — 0DJD8ZZ INSPECTION OF LOWER INTESTINAL TRACT, VIA NATURAL OR ARTIFICIAL OPENING ENDOSCOPIC: ICD-10-PCS | Performed by: INTERNAL MEDICINE

## 2019-07-27 PROCEDURE — 36430 TRANSFUSION BLD/BLD COMPNT: CPT

## 2019-07-27 PROCEDURE — 160048 HCHG OR STATISTICAL LEVEL 1-5: Performed by: INTERNAL MEDICINE

## 2019-07-27 PROCEDURE — 99153 MOD SED SAME PHYS/QHP EA: CPT | Performed by: INTERNAL MEDICINE

## 2019-07-27 PROCEDURE — 700101 HCHG RX REV CODE 250: Performed by: INTERNAL MEDICINE

## 2019-07-27 PROCEDURE — P9016 RBC LEUKOCYTES REDUCED: HCPCS

## 2019-07-27 PROCEDURE — 85014 HEMATOCRIT: CPT | Mod: 91

## 2019-07-27 PROCEDURE — 36415 COLL VENOUS BLD VENIPUNCTURE: CPT

## 2019-07-27 PROCEDURE — 86923 COMPATIBILITY TEST ELECTRIC: CPT

## 2019-07-27 PROCEDURE — 700111 HCHG RX REV CODE 636 W/ 250 OVERRIDE (IP): Performed by: INTERNAL MEDICINE

## 2019-07-27 PROCEDURE — 160203 HCHG ENDO MINUTES - 1ST 30 MINS LEVEL 4: Performed by: INTERNAL MEDICINE

## 2019-07-27 PROCEDURE — 770020 HCHG ROOM/CARE - TELE (206)

## 2019-07-27 PROCEDURE — 99233 SBSQ HOSP IP/OBS HIGH 50: CPT | Performed by: INTERNAL MEDICINE

## 2019-07-27 PROCEDURE — 700105 HCHG RX REV CODE 258

## 2019-07-27 RX ORDER — SODIUM CHLORIDE 9 MG/ML
500 INJECTION, SOLUTION INTRAVENOUS
Status: DISCONTINUED | OUTPATIENT
Start: 2019-07-27 | End: 2019-07-27 | Stop reason: HOSPADM

## 2019-07-27 RX ORDER — MIDAZOLAM HYDROCHLORIDE 1 MG/ML
INJECTION INTRAMUSCULAR; INTRAVENOUS
Status: DISCONTINUED | OUTPATIENT
Start: 2019-07-27 | End: 2019-07-27 | Stop reason: HOSPADM

## 2019-07-27 RX ORDER — SODIUM CHLORIDE AND POTASSIUM CHLORIDE 150; 900 MG/100ML; MG/100ML
INJECTION, SOLUTION INTRAVENOUS CONTINUOUS
Status: DISCONTINUED | OUTPATIENT
Start: 2019-07-27 | End: 2019-07-29 | Stop reason: HOSPADM

## 2019-07-27 RX ORDER — SODIUM CHLORIDE 9 MG/ML
INJECTION, SOLUTION INTRAVENOUS
Status: COMPLETED
Start: 2019-07-27 | End: 2019-07-27

## 2019-07-27 RX ORDER — MIDAZOLAM HYDROCHLORIDE 1 MG/ML
.5-2 INJECTION INTRAMUSCULAR; INTRAVENOUS PRN
Status: DISCONTINUED | OUTPATIENT
Start: 2019-07-27 | End: 2019-07-27 | Stop reason: HOSPADM

## 2019-07-27 RX ADMIN — POTASSIUM CHLORIDE AND SODIUM CHLORIDE: 900; 150 INJECTION, SOLUTION INTRAVENOUS at 08:19

## 2019-07-27 RX ADMIN — POLYETHYLENE GLYCOL 3350, SODIUM SULFATE, SODIUM CHLORIDE, POTASSIUM CHLORIDE, ASCORBIC ACID, SODIUM ASCORBATE 100 G: KIT at 06:30

## 2019-07-27 RX ADMIN — DONEPEZIL HYDROCHLORIDE 10 MG: 5 TABLET, FILM COATED ORAL at 17:04

## 2019-07-27 RX ADMIN — SUCRALFATE 1 G: 1 TABLET ORAL at 17:04

## 2019-07-27 RX ADMIN — SODIUM CHLORIDE: 9 INJECTION, SOLUTION INTRAVENOUS at 01:15

## 2019-07-27 RX ADMIN — ACETAMINOPHEN 650 MG: 325 TABLET, FILM COATED ORAL at 12:44

## 2019-07-27 RX ADMIN — SODIUM CHLORIDE, POTASSIUM CHLORIDE, SODIUM LACTATE AND CALCIUM CHLORIDE: 600; 310; 30; 20 INJECTION, SOLUTION INTRAVENOUS at 06:42

## 2019-07-27 RX ADMIN — POTASSIUM CHLORIDE AND SODIUM CHLORIDE: 900; 150 INJECTION, SOLUTION INTRAVENOUS at 23:49

## 2019-07-27 RX ADMIN — SUCRALFATE 1 G: 1 TABLET ORAL at 23:49

## 2019-07-27 ASSESSMENT — ENCOUNTER SYMPTOMS
FLANK PAIN: 0
FEVER: 0
HEADACHES: 1
FOCAL WEAKNESS: 0
HEARTBURN: 0
BACK PAIN: 0
MYALGIAS: 0
CHILLS: 0
ABDOMINAL PAIN: 0
NERVOUS/ANXIOUS: 0
PALPITATIONS: 0
MEMORY LOSS: 0
SHORTNESS OF BREATH: 0
WEAKNESS: 1
DIZZINESS: 0

## 2019-07-27 NOTE — PROCEDURES
DATE OF SERVICE:  07/27/2019    PROCEDURE:  Colonoscopy.    INDICATION:  Acute GI bleed, anemia with hematochezia, and melena.    FINAL IMPRESSION:  1.  Digital rectal exam unremarkable.  2.  Terminal ileum, unremarkable mucosa with no blood seen on copious flushing   and suctioning.  3.  Scattered heme staining throughout the colon.  4.  No overt GI bleeding identified.  5.  Diverticulosis.  Moderate throughout the whole colon.  Suspected as the   source of bleeding, although no active bleeding was found.    RECOMMENDATIONS:  1.  If repeat bleeding, order a stat tagged red cell scan versus CT angiogram   with reflex to interventional radiology with possible coil embolization.  2.  Follow CBC.  3.  Transfuse to keep hemoglobin greater than 7.  4.  Call GI if recurrent bleeding occurs.    GI will sign off/standby.  Please call if any concerns arise.    DESCRIPTION OF PROCEDURE:  Prior to the procedure, physical exam was stable.    During procedure, vital signs remained within normal limits.  Prior to   sedation, informed consent was obtained.  Risks, benefits, and alternatives   including but not limited to risk of bleeding, infection, perforation, adverse   reaction to medication, failure to identify pathology, pancreatitis and death   explained to the patient who accepted all risks.  Patient prepped in the left   lateral position.  IV sedation was given slowly in an incremental fashion to   achieve desired effect to a total of fentanyl 50 mcg and midazolam 2 mg.    Digital rectal exam was performed and was unremarkable.  Scope tip of the   Olympus flexible adjustable adult colonoscope passed from the rectum through   to the cecum without difficulty.  On scope insertion, there were small areas   of heme staining and old blood, but no active bleeding or source of bleeding   identified.  Ileocecal valve area behind the ileocecal valve and appendiceal   orifice were well visualized and were unremarkable.  Scope  retroflexed in the   right colon and was unremarkable.  Terminal ileum was intubated for 15 cm.    Copious washing was made within the terminal ileum to fill the distal small   bowel and then this was suctioned and was productive of only clear liquid with   no evidence of blood whatsoever.  Scope was withdrawn back into the cecum,   then slowly withdrawn from the cecum through to the rectum visualizing the   mucosa in a methodical 360-degree manner.  Multiple diverticula were seen.    Some diverticula with old blood in it.  This was washed and suctioned and no   active bleeding or source of bleeding was identified.  Scope further withdrawn   in a similar fashion, is closely examining the diverticula with no active   bleeding seen.  The rectum was reached.  Retroflex in the rectum was   unremarkable.  Scope was straightened and withdrawn.  Air and liquid were   suctioned.  Patient tolerated the procedure well and was sent to recovery   without immediate complications.       ____________________________________     MD MACY FIGUEROA / DEMARIO    DD:  07/27/2019 11:28:49  DT:  07/27/2019 11:40:21    D#:  [id number]  Job#:  [id number]

## 2019-07-27 NOTE — ASSESSMENT & PLAN NOTE
Secondary to GI bleed  Status post EGD with possible duodenal ulcer, biopsy pending  PPI twice daily    Possible diverticular bleed  History of    7/28Status post colonoscopy, with diffuse diverticulosis, likely source of bleeding  Per GI, if recurrent bleed: Will order STAT tagged red cell scan with reflex to IR    H&H stable  Transfuse if hemoglobin less than 7  Follow-up a.m. CBC

## 2019-07-27 NOTE — PROCEDURES
DATE OF SERVICE:  07/26/2019    PROCEDURE:  Esophagogastroduodenoscopy with biopsy.    INDICATION:  Acute GI bleed, anemia with azotemia, melena and hematochezia.    FINAL IMPRESSION:  1.  Esophagus, unremarkable mucosa.  2.  Stomach, 2 cm hiatal hernia.  Atrophic appearing throughout.  Antrum   inflammation, biopsied.  3.  Duodenal sweep inflammation and thickened fold suspicious for underlying   ulcer, but no ulcer seen.  Biopsies taken.  4.  Distal duodenum unremarkable.  5.  No bleeding source identified and no blood seen on the upper endoscopy.    RECOMMENDATIONS:  1.  Follow CBC.  2.  Transfuse to keep hemoglobin greater than or equal to 7.  3.  May stop PPI drip and change to twice daily PPI therapy.  4.  Start sucralfate q.i.d. before meals and at bedtime.  5.  Patient may have a clear diet now, but no red foods.  6.  N.p.o. past midnight.  7.  Start colonoscopy prep now.  8.  Colonoscopy tomorrow morning.  9.  If repeat acute bleeding, then order stat tagged red blood cell scan with   nuclear medicine.    DESCRIPTION OF PROCEDURE:  Prior to the procedure, physical exam was stable.    Prior to sedation, informed consent was obtained.  Risks, benefits,   alternatives including but not limited to risk of bleeding, infection,   perforation, adverse reaction to medication, failure to identify pathology and   death explained to the patient and her  who accepted all risks.    Patient prepped in the left lateral position.  IV sedation was given slowly in   an incremental fashion to achieve desired effect to a total of fentanyl 50   mcg and midazolam 2 mg.    Scope tip of the Olympus flexible gastroscope passed in the proximal   esophagus.  Proximal middle and distal thirds the esophagus were well   visualized and were unremarkable.  The Z line was regular at 35 cm and there   was a nonobstructive Schatzki's ring seen.  Stomach was entered.  There was a   small hiatal hernia from 35-37 cm.  Gastric pool was  suctioned.  Air was   insufflated.  Scope retroflexed to view the body, fundus, and cardia, then   straightened to view the antrum and incisura.  The small hiatal hernia was   appreciated on retroflex.  The gastric mucosa was slightly atrophic throughout   with a pale appearance.  In the antrum, there was erythema and edema as well   as multiple small erosions, which were biopsied.  No blood was seen within the   stomach or esophagus.  The pylorus was patent.  Duodenal bulb was entered.    The bulb itself was unremarkable, but through the duodenal sweep, there were   thickened folds and inflammation suspicious for an underlying ulcer, but one   could not be demonstrated.  Biopsies were taken of this inflamed area.  The   second and third portions of the duodenum were unremarkable.  Once again of   note, no bleeding source was identified and no blood was seen anywhere on the   endoscopy.  Scope was withdrawn.  Air and liquid were suctioned.  Patient   tolerated the procedure well and recovered in the room without immediate   complications.       ____________________________________     MD MACY FIGUEROA / DEMARIO    DD:  07/26/2019 17:01:07  DT:  07/26/2019 17:27:13    D#:  [id number]  Job#:  [id number]

## 2019-07-27 NOTE — PROGRESS NOTES
[facility] Medicine Daily Progress Note    Date of Service  7/27/2019    Chief Complaint  68 y.o. female admitted 7/25/2019 with history of gastritis and duodenitis as well as duodenal ulcer with prior GI bleed in 2016 as well as 2018, was seen by Dr. Enamorado in the past now with complaints of rectal bleeding/diarrhea and dizziness.  She is admitted for GI bleed with acute blood loss.  Blood pressure is borderline low.  GI has been consulted.    Hospital Course    68 y.o. female admitted 7/25/2019 with history of gastritis and duodenitis as well as duodenal ulcer with prior GI bleed in 2016 as well as 2018, was seen by Dr. Enamorado in the past now with complaints of rectal bleeding/diarrhea and dizziness.  She is admitted for GI bleed with acute blood loss.  Blood pressure is borderline low.  GI has been consulted.      Interval Problem Update  Status post EGD  Possible duodenal ulcer  Status post transfusion PRBC this a.m.  Hemodynamically stable  Denies abdominal pain  N.p.o. for colonoscopy today    Plan of care reviewed with patient and  at bedside    Consultants/Specialty  GI    Code Status  Full    Disposition  TBD    Review of Systems  Review of Systems   Constitutional: Negative for chills and fever.   Respiratory: Negative for shortness of breath.    Cardiovascular: Negative for chest pain, palpitations and leg swelling.   Gastrointestinal: Negative for abdominal pain and heartburn.   Genitourinary: Negative for flank pain and urgency.   Musculoskeletal: Negative for back pain, joint pain and myalgias.   Neurological: Positive for weakness and headaches. Negative for dizziness and focal weakness.   Psychiatric/Behavioral: Negative for memory loss. The patient is not nervous/anxious.         Physical Exam  Temp:  [36.1 °C (97 °F)-36.9 °C (98.5 °F)] 36.3 °C (97.3 °F)  Pulse:  [] 77  Resp:  [13-25] 16  BP: (108-116)/(65-77) 114/65  SpO2:  [92 %-99 %] 92 %    Physical Exam   Constitutional: She is  oriented to person, place, and time. She appears well-nourished. No distress.   HENT:   Head: Normocephalic and atraumatic.   Eyes: Pupils are equal, round, and reactive to light. EOM are normal. Right eye exhibits no discharge. Left eye exhibits no discharge.   Neck: Neck supple. No thyromegaly present.   Cardiovascular: Normal rate and intact distal pulses.    No murmur heard.  Pulmonary/Chest: Breath sounds normal. No respiratory distress. She has no wheezes.   Abdominal: Soft. Bowel sounds are normal. She exhibits no distension. There is no tenderness.   Musculoskeletal: She exhibits no edema or tenderness.   Neurological: She is alert and oriented to person, place, and time. No cranial nerve deficit. Coordination normal.   Skin: Skin is warm and dry. No rash noted. She is not diaphoretic. No erythema. There is pallor.   Psychiatric: She has a normal mood and affect. Her behavior is normal. Thought content normal.   Nursing note and vitals reviewed.      Fluids    Intake/Output Summary (Last 24 hours) at 07/27/19 1603  Last data filed at 07/27/19 1500   Gross per 24 hour   Intake          3358.76 ml   Output                0 ml   Net          3358.76 ml       Laboratory  Recent Labs      07/25/19   0700   07/26/19   2348  07/27/19   0447  07/27/19   1216   WBC  10.6   --   12.2*   --    --    RBC  3.30*   --   2.11*   --    --    HEMOGLOBIN  10.7*   < >  6.7*  7.9*  8.2*   HEMATOCRIT  32.8*   < >  21.7*  24.0*  25.4*   MCV  99.4*   --   102.8*   --    --    MCH  32.4   --   31.8   --    --    MCHC  32.6*   --   30.9*   --    --    RDW  54.0*   --   56.5*   --    --    PLATELETCT  277   --   242   --    --    MPV  10.2   --   10.8   --    --     < > = values in this interval not displayed.     Recent Labs      07/25/19   0700  07/26/19   0015  07/26/19   2348   SODIUM  139  140  142   POTASSIUM  3.9  3.6  3.4*   CHLORIDE  111  111  113*   CO2  18*  18*  19*   GLUCOSE  94  108*  106*   BUN  29*  22  16    CREATININE  0.65  0.56  0.56   CALCIUM  8.5  8.1*  7.5*     Recent Labs      07/25/19   0700   APTT  24.9   INR  0.97               Imaging  CTA ABDOMEN PELVIS W & W/O POST PROCESS   Final Result         1. No active bleeding seen in the bowel.      2. Diverticulosis.           Assessment/Plan  Rectal bleeding- (present on admission)   Assessment & Plan    Painless, gush of bleeding without preceding melena  Patient with prior history of peptic ulcer disease and pandiverticulosis  Painless bleeding, BRBPR concerning for diverticular bleed  Azotemia concerning for upper GI bleed    At this time patient will be admitted to the hospital        At this time we will provide the patient with 80 mg of IV Protonix, subsequently maintain on 8 mg/h Protonix infusion   type and screen, TEG with platelet mapping  Gentle IV fluid hydration      7/26H&H down trending from 10-8  Monitor Hb / Restrictive transfusion strategy  Maintain 2 large-bore IV access at all times  Need for endoscopic evaluation per gastroenterology team  Consult GI, seen by Dr. Akers in the past    CT abdomen pelvis with diverticulosis no bleed identified    If orthostatic, de-escalation and vital signs, transfer to the intensive care unit    CODE STATUS discussed with patient, spouse at bedside.  DNR/DNI.    7/27 status post EGD with possible duodenal ulcer, status post biopsy  Appreciate GI support  N.p.o. for colonoscopy today  Continued blood loss status post transfusion, hemodynamically stable at this time  Continue close monitoring of H&H  PPI twice daily     Hypokalemia- (present on admission)   Assessment & Plan    Start IV repletion  Monitor     Acute blood loss anemia- (present on admission)   Assessment & Plan    Secondary to GI bleed  Status post EGD with possible duodenal ulcer, biopsy pending  PPI twice daily    Possible diverticular bleed  History of     DNR (do not resuscitate)- (present on admission)   Assessment & Plan    DNR/DNI      Dementia- (present on admission)   Assessment & Plan    Known history of  Avoid sedative, narcotics / BZDs as able     Hypothyroid- (present on admission)   Assessment & Plan    Continue at home supplementation of thyroid medications,  TSH wnl          VTE prophylaxis: SCD

## 2019-07-27 NOTE — PROGRESS NOTES
After Pt finished [employer] they had liquid bloody stool with clots and clear emesis. Pt SOB and feeling dizzy. VS stable. Dr. José was updated. Will continue to monitor.

## 2019-07-27 NOTE — CONSULTS
DATE OF SERVICE:  07/26/2019    REASON FOR CONSULTATION:  Acute GI blood loss anemia with melena and   hematochezia.    REQUESTING PHYSICIAN:  Cornelia Mauro MD    HISTORY OF PRESENT ILLNESS:  Thank you very much for asking me to see this   very pleasant 68-year-old female who has a history of Alzheimer's dementia and   a history of GI bleeding.  She has been seen by Dr. Akers in our group and   has a history of EGD and colonoscopy in 2016 apparently, but I do not have   these records as my office computer is unavailable.  She also has a history of   diverticular bleeding.  She was in her usual state of health until the day   before yesterday, she noted small amount of bright red blood in her stool.    First, this was small volume, but then she had bloody diarrhea with 3 large   volume bloody stools.  Her  brought her to the ER and she was admitted.    She was hemodynamically stable and had an additional stool, which was   melenic in nature.  GI was called because of this and upon my arrival, the   patient was just being cleaned up from a very large volume of bright red blood   per rectum with blood clots and orthostatic features including presyncope.    Patient denies nausea or vomiting, no abdominal pain, no chest pain or   shortness of breath other than with exertion.    REVIEW OF SYSTEMS:  A 10-system review of systems obtained by myself,   pertinent positives and negatives stated otherwise noncontributory.    PAST MEDICAL HISTORY:  Diverticulosis, history of GI bleeding, history of   dementia, history of peptic ulcer disease, history of back pain,   hypothyroidism.    PAST SURGICAL HISTORY:  Abdominal hernia repairs, orthopedic surgeries and GYN   surgery.    ADVERSE DRUG REACTIONS:  No known drug allergies.    MEDICATIONS:  Reviewed by myself.  Please see the chart for details.  She   takes acetaminophen, but does not take any nonsteroidal anti-inflammatory   drugs.    SOCIAL HISTORY:  Negative for  tobacco or alcohol use.    FAMILY HISTORY:  Negative for gastrointestinal, hepatobiliary, or pancreatic   disease.    LABORATORY DATA:  White count 10.6, hemoglobin 8.4, this was 10.7 on admit.    Hematocrit is 27.2, platelets 277.  Sodium 140, potassium is 3.6, chloride   111, bicarbonate 18, BUN elevated at 22, creatinine normal at 0.56.  AST 14,   ALT 14, alkaline phosphatase 53, total bilirubin is 0.4.  INR is normal at   0.97.  CT angiogram yesterday showed no evidence of GI bleeding.    PHYSICAL EXAMINATION:  GENERAL:  She is in no distress.  She is alert, but is mildly confused.  VITAL SIGNS:  Temperature 98.0, pulse 83, blood pressure 103/69, respirations   18.  HEENT:  Sclerae nonicteric.  Mucous membranes pale, but moist.  HEAD AND NECK:  No adenopathy.  LUNGS:  Clear bilaterally.  HEART:  Sounds are regular.  ABDOMEN:  Soft, nontender, nondistended.  Bowel sounds are normal to slightly   elevated.    ASSESSMENT:  1.  Acute gastrointestinal bleed anemia, unclear if upper or lower source.  2.  Orthostasis.  3.  Azotemia.  4.  History of peptic ulcer disease.  5.  Dementia.    DISCUSSION:  Given her elevated BUN and creatinine ratio and orthostatic   symptoms, an upper GI source of bleeding is a distinct possibility and needs   to be followed up urgently.  I would also consider diverticular disease.    PLAN:  1.  Keep n.p.o.  2.  Pantoprazole 80 mg bolus and 8 mg an hour drip.  3.  Serial CBCs.  4.  Transfuse to keep hemoglobin greater than or equal to 7.  5.  Urgent EGD, endoscopy has been called and patient is being called for.  6.  Colonoscopy will be ordered for tomorrow if EGD is negative.    Thank you very much for allowing me to participate in the care of this nice   lady.  If you have any questions, please do not hesitate to call.       ____________________________________     MD MACY FIGUEROA / DEMARIO    DD:  07/26/2019 15:49:03  DT:  07/26/2019 17:36:29    D#:  [id number]  Job#:  [id number]

## 2019-07-27 NOTE — OR SURGEON
Immediate Post OP Note    PreOp Diagnosis: Acute GI bleed anemia    PostOp Diagnosis: Same    Procedure(s):  COLONOSCOPY - Wound Class: Clean Contaminated    Surgeon(s):  Guru Castañeda M.D.    Anesthesiologist/Type of Anesthesia:  No anesthesia staff entered./Sedation    Surgical Staff:  Endoscopy Technician: Nate Mccall  Sedation/Monitoring Nurse: Briseyda Ayers R.N.    Specimens removed if any:  * No specimens in log *    Dr. Castañeda  [facility]  [facility]  [phone]    Colonoscopy    Indication:  Acute GI bleed anemia    Sedation:  50mcg Fentanyl, 2mg Midazolam    Findings:   Colonoscopy    LASHELL     Unremarkable    Terminal ileum     Unremarkable     No blood on flushing    Colon     Scattered heme staining     No overt bleeding     Diverticulosis      Moderate      Throughout whole colon      Suspected as source of bleed    Plan:   If repeat bleeding order STAT tagged red cell scan with reflex to IR     Follow CBC     Transfuse prn to Hb >7     Call GI if recurrent bleeding     **  GI WILL SIGN OFF / STAND BY - PLEASE CALL IF ANY CONCERNS  **      7/27/2019 11:15 AM Guru Castañeda M.D.

## 2019-07-27 NOTE — PROGRESS NOTES
Bedside report received by day JESUS Vera. Patient sitting up in bed watching TV at this time, family present. POC discussed, verbalized understanding. No immediate concerns for patient at this time. All safety measures in place.

## 2019-07-28 LAB
ANION GAP SERPL CALC-SCNC: 12 MMOL/L (ref 0–11.9)
BASOPHILS # BLD AUTO: 0.4 % (ref 0–1.8)
BASOPHILS # BLD: 0.04 K/UL (ref 0–0.12)
BUN SERPL-MCNC: 7 MG/DL (ref 8–22)
CALCIUM SERPL-MCNC: 7.4 MG/DL (ref 8.5–10.5)
CHLORIDE SERPL-SCNC: 114 MMOL/L (ref 96–112)
CO2 SERPL-SCNC: 16 MMOL/L (ref 20–33)
CREAT SERPL-MCNC: 0.4 MG/DL (ref 0.5–1.4)
EOSINOPHIL # BLD AUTO: 0.12 K/UL (ref 0–0.51)
EOSINOPHIL NFR BLD: 1.3 % (ref 0–6.9)
ERYTHROCYTE [DISTWIDTH] IN BLOOD BY AUTOMATED COUNT: 58.6 FL (ref 35.9–50)
GLUCOSE SERPL-MCNC: 108 MG/DL (ref 65–99)
HCT VFR BLD AUTO: 22.9 % (ref 37–47)
HCT VFR BLD AUTO: 23.3 % (ref 37–47)
HCT VFR BLD AUTO: 23.6 % (ref 37–47)
HCT VFR BLD AUTO: 24.1 % (ref 37–47)
HGB BLD-MCNC: 7.3 G/DL (ref 12–16)
HGB BLD-MCNC: 7.4 G/DL (ref 12–16)
HGB BLD-MCNC: 7.5 G/DL (ref 12–16)
HGB BLD-MCNC: 7.6 G/DL (ref 12–16)
IMM GRANULOCYTES # BLD AUTO: 0.07 K/UL (ref 0–0.11)
IMM GRANULOCYTES NFR BLD AUTO: 0.7 % (ref 0–0.9)
LYMPHOCYTES # BLD AUTO: 2.29 K/UL (ref 1–4.8)
LYMPHOCYTES NFR BLD: 24.4 % (ref 22–41)
MCH RBC QN AUTO: 31.2 PG (ref 27–33)
MCHC RBC AUTO-ENTMCNC: 31.3 G/DL (ref 33.6–35)
MCV RBC AUTO: 99.6 FL (ref 81.4–97.8)
MONOCYTES # BLD AUTO: 0.77 K/UL (ref 0–0.85)
MONOCYTES NFR BLD AUTO: 8.2 % (ref 0–13.4)
NEUTROPHILS # BLD AUTO: 6.08 K/UL (ref 2–7.15)
NEUTROPHILS NFR BLD: 65 % (ref 44–72)
NRBC # BLD AUTO: 0.03 K/UL
NRBC BLD-RTO: 0.3 /100 WBC
PLATELET # BLD AUTO: 223 K/UL (ref 164–446)
PMV BLD AUTO: 10.1 FL (ref 9–12.9)
POTASSIUM SERPL-SCNC: 3.2 MMOL/L (ref 3.6–5.5)
RBC # BLD AUTO: 2.34 M/UL (ref 4.2–5.4)
SODIUM SERPL-SCNC: 142 MMOL/L (ref 135–145)
WBC # BLD AUTO: 9.4 K/UL (ref 4.8–10.8)

## 2019-07-28 PROCEDURE — A9270 NON-COVERED ITEM OR SERVICE: HCPCS | Performed by: INTERNAL MEDICINE

## 2019-07-28 PROCEDURE — 700102 HCHG RX REV CODE 250 W/ 637 OVERRIDE(OP): Performed by: INTERNAL MEDICINE

## 2019-07-28 PROCEDURE — 36415 COLL VENOUS BLD VENIPUNCTURE: CPT

## 2019-07-28 PROCEDURE — 770020 HCHG ROOM/CARE - TELE (206)

## 2019-07-28 PROCEDURE — 99233 SBSQ HOSP IP/OBS HIGH 50: CPT | Performed by: INTERNAL MEDICINE

## 2019-07-28 PROCEDURE — 85018 HEMOGLOBIN: CPT | Mod: 91

## 2019-07-28 PROCEDURE — 700101 HCHG RX REV CODE 250: Performed by: INTERNAL MEDICINE

## 2019-07-28 PROCEDURE — 85025 COMPLETE CBC W/AUTO DIFF WBC: CPT

## 2019-07-28 PROCEDURE — 85014 HEMATOCRIT: CPT | Mod: 91

## 2019-07-28 PROCEDURE — 80048 BASIC METABOLIC PNL TOTAL CA: CPT

## 2019-07-28 RX ORDER — POTASSIUM CHLORIDE 20 MEQ/1
20 TABLET, EXTENDED RELEASE ORAL DAILY
Status: DISCONTINUED | OUTPATIENT
Start: 2019-07-28 | End: 2019-07-29 | Stop reason: HOSPADM

## 2019-07-28 RX ADMIN — POTASSIUM CHLORIDE AND SODIUM CHLORIDE: 900; 150 INJECTION, SOLUTION INTRAVENOUS at 13:10

## 2019-07-28 RX ADMIN — SUCRALFATE 1 G: 1 TABLET ORAL at 23:42

## 2019-07-28 RX ADMIN — DONEPEZIL HYDROCHLORIDE 10 MG: 5 TABLET, FILM COATED ORAL at 18:06

## 2019-07-28 RX ADMIN — LIOTHYRONINE SODIUM 5 MCG: 5 TABLET ORAL at 06:25

## 2019-07-28 RX ADMIN — POTASSIUM CHLORIDE AND SODIUM CHLORIDE: 900; 150 INJECTION, SOLUTION INTRAVENOUS at 20:14

## 2019-07-28 RX ADMIN — LEVOTHYROXINE SODIUM 88 MCG: 88 TABLET ORAL at 06:24

## 2019-07-28 RX ADMIN — POTASSIUM CHLORIDE 20 MEQ: 1500 TABLET, EXTENDED RELEASE ORAL at 13:37

## 2019-07-28 RX ADMIN — SUCRALFATE 1 G: 1 TABLET ORAL at 06:25

## 2019-07-28 RX ADMIN — SUCRALFATE 1 G: 1 TABLET ORAL at 13:38

## 2019-07-28 RX ADMIN — SUCRALFATE 1 G: 1 TABLET ORAL at 18:06

## 2019-07-28 ASSESSMENT — ENCOUNTER SYMPTOMS
FEVER: 0
MEMORY LOSS: 0
ABDOMINAL PAIN: 0
FLANK PAIN: 0
WEAKNESS: 1
MYALGIAS: 0
NERVOUS/ANXIOUS: 0
DIZZINESS: 0
CHILLS: 0
HEADACHES: 1
SHORTNESS OF BREATH: 0
PALPITATIONS: 0
FOCAL WEAKNESS: 0
HEARTBURN: 0
BACK PAIN: 0

## 2019-07-28 NOTE — CARE PLAN
Problem: Safety  Goal: Will remain free from injury  Outcome: PROGRESSING AS EXPECTED  Bed locked and in lowest position. Bed alarm on. Treaded socks. Call light and belongings within reach. Patient educated to call for assistance. Pt verbalized understanding. Hourly rounding in place.      Problem: Infection  Goal: Will remain free from infection  Outcome: PROGRESSING AS EXPECTED  Pt educated on importance of good hand hygiene and verbalized understanding.

## 2019-07-28 NOTE — CARE PLAN
Problem: Communication  Goal: The ability to communicate needs accurately and effectively will improve    Intervention: Educate patient and significant other/support system about the plan of care, procedures, treatments, medications and allow for questions  Patient and family have demonstrated effective communication.       Problem: Safety  Goal: Will remain free from falls  Bed in lowest, locked position. Bed alarm on. Patient utilizing call light appropriately.

## 2019-07-28 NOTE — PROGRESS NOTES
Received bedside report from night RN, patient care assumed. Updated patient on POC, questions answered. Bed in lowest locked position, call light and belongings within reach.

## 2019-07-28 NOTE — PROGRESS NOTES
[facility] Medicine Daily Progress Note    Date of Service  7/28/2019    Chief Complaint  68 y.o. female admitted 7/25/2019 with history of gastritis and duodenitis as well as duodenal ulcer with prior GI bleed in 2016 as well as 2018, was seen by Dr. Enamorado in the past now with complaints of rectal bleeding/diarrhea and dizziness.  She is admitted for GI bleed with acute blood loss.  Blood pressure is borderline low.  GI has been consulted.    Hospital Course    68 y.o. female admitted 7/25/2019 with history of gastritis and duodenitis as well as duodenal ulcer with prior GI bleed in 2016 as well as 2018, was seen by Dr. Enamorado in the past now with complaints of rectal bleeding/diarrhea and dizziness.  She is admitted for GI bleed with acute blood loss.  Blood pressure is borderline low.  GI has been consulted.      Interval Problem Update  Status post EGD  Possible duodenal ulcer  Status post transfusion PRBC this a.m.  Hemodynamically stable  Denies abdominal pain  N.p.o. for colonoscopy today    Plan of care reviewed with patient and  at bedside    Consultants/Specialty  GI    Code Status  Full    Disposition  TBD    Review of Systems  Review of Systems   Constitutional: Negative for chills and fever.   Respiratory: Negative for shortness of breath.    Cardiovascular: Negative for chest pain, palpitations and leg swelling.   Gastrointestinal: Negative for abdominal pain and heartburn.   Genitourinary: Negative for flank pain and urgency.   Musculoskeletal: Negative for back pain, joint pain and myalgias.   Neurological: Positive for weakness and headaches. Negative for dizziness and focal weakness.   Psychiatric/Behavioral: Negative for memory loss. The patient is not nervous/anxious.         Physical Exam  Temp:  [36.2 °C (97.1 °F)-37.3 °C (99.1 °F)] 36.2 °C (97.1 °F)  Pulse:  [72-97] 83  Resp:  [16-18] 16  BP: (100-131)/(54-90) 128/90  SpO2:  [93 %-99 %] 96 %    Physical Exam   Constitutional: She is  oriented to person, place, and time. She appears well-nourished. No distress.   HENT:   Head: Normocephalic and atraumatic.   Eyes: Pupils are equal, round, and reactive to light. EOM are normal. Right eye exhibits no discharge. Left eye exhibits no discharge.   Neck: Neck supple. No thyromegaly present.   Cardiovascular: Normal rate and intact distal pulses.    No murmur heard.  Pulmonary/Chest: Breath sounds normal. No respiratory distress. She has no wheezes.   Abdominal: Soft. Bowel sounds are normal. She exhibits no distension. There is no tenderness.   Musculoskeletal: She exhibits no edema or tenderness.   Neurological: She is alert and oriented to person, place, and time. No cranial nerve deficit. Coordination normal.   Skin: Skin is warm and dry. No rash noted. She is not diaphoretic. No erythema. There is pallor.   Psychiatric: She has a normal mood and affect. Her behavior is normal. Thought content normal.   Nursing note and vitals reviewed.      Fluids    Intake/Output Summary (Last 24 hours) at 07/28/19 1245  Last data filed at 07/27/19 1500   Gross per 24 hour   Intake              450 ml   Output                0 ml   Net              450 ml       Laboratory  Recent Labs      07/26/19   2348   07/28/19   0012  07/28/19   0410  07/28/19   1200   WBC  12.2*   --   9.4   --    --    RBC  2.11*   --   2.34*   --    --    HEMOGLOBIN  6.7*   < >  7.3*  7.6*  7.4*   HEMATOCRIT  21.7*   < >  23.3*  22.9*  24.1*   MCV  102.8*   --   99.6*   --    --    MCH  31.8   --   31.2   --    --    MCHC  30.9*   --   31.3*   --    --    RDW  56.5*   --   58.6*   --    --    PLATELETCT  242   --   223   --    --    MPV  10.8   --   10.1   --    --     < > = values in this interval not displayed.     Recent Labs      07/26/19   0015  07/26/19   2348  07/28/19   0012   SODIUM  140  142  142   POTASSIUM  3.6  3.4*  3.2*   CHLORIDE  111  113*  114*   CO2  18*  19*  16*   GLUCOSE  108*  106*  108*   BUN  22  16  7*    CREATININE  0.56  0.56  0.40*   CALCIUM  8.1*  7.5*  7.4*                   Imaging  CTA ABDOMEN PELVIS W & W/O POST PROCESS   Final Result         1. No active bleeding seen in the bowel.      2. Diverticulosis.           Assessment/Plan  Rectal bleeding- (present on admission)   Assessment & Plan    Painless, gush of bleeding without preceding melena  Patient with prior history of peptic ulcer disease and pandiverticulosis  Painless bleeding, BRBPR concerning for diverticular bleed  Azotemia concerning for upper GI bleed    At this time patient will be admitted to the hospital        At this time we will provide the patient with 80 mg of IV Protonix, subsequently maintain on 8 mg/h Protonix infusion   type and screen, TEG with platelet mapping  Gentle IV fluid hydration      7/26H&H down trending from 10-8  Monitor Hb / Restrictive transfusion strategy  Maintain 2 large-bore IV access at all times  Need for endoscopic evaluation per gastroenterology team  Consult GI, seen by Dr. Akers in the past    CT abdomen pelvis with diverticulosis no bleed identified    If orthostatic, de-escalation and vital signs, transfer to the intensive care unit    CODE STATUS discussed with patient, spouse at bedside.  DNR/DNI.    7/27 status post EGD with possible duodenal ulcer, status post biopsy  Appreciate GI support  N.p.o. for colonoscopy today  Continued blood loss status post transfusion, hemodynamically stable at this time  Continue close monitoring of H&H  PPI twice daily    7/28 Plan as above     Hypokalemia- (present on admission)   Assessment & Plan    Add oral replacement  Monitor     Acute blood loss anemia- (present on admission)   Assessment & Plan    Secondary to GI bleed  Status post EGD with possible duodenal ulcer, biopsy pending  PPI twice daily    Possible diverticular bleed  History of    7/28Status post colonoscopy, with diffuse diverticulosis, likely source of bleeding  Per GI, if recurrent bleed: Will  order STAT tagged red cell scan with reflex to IR    H&H stable  Transfuse if hemoglobin less than 7  Follow-up a.m. CBC     DNR (do not resuscitate)- (present on admission)   Assessment & Plan    DNR/DNI     Dementia- (present on admission)   Assessment & Plan    Known history of  Avoid sedative, narcotics / BZDs as able     Hypothyroid- (present on admission)   Assessment & Plan    Continue at home supplementation of thyroid medications,  TSH wnl          VTE prophylaxis: SCD

## 2019-07-28 NOTE — PROGRESS NOTES
Bedside report received by day RN Shabnam. Patient sitting up in bed watching TV at this time. POC discussed, verbalized understanding. No immediate concerns for patient at this time. All safety measures in place.

## 2019-07-29 ENCOUNTER — PATIENT OUTREACH (OUTPATIENT)
Dept: HEALTH INFORMATION MANAGEMENT | Facility: OTHER | Age: 69
End: 2019-07-29

## 2019-07-29 VITALS
BODY MASS INDEX: 24.88 KG/M2 | OXYGEN SATURATION: 96 % | DIASTOLIC BLOOD PRESSURE: 76 MMHG | RESPIRATION RATE: 16 BRPM | HEART RATE: 73 BPM | HEIGHT: 64 IN | TEMPERATURE: 98.3 F | WEIGHT: 145.72 LBS | SYSTOLIC BLOOD PRESSURE: 141 MMHG

## 2019-07-29 LAB
ANION GAP SERPL CALC-SCNC: 9 MMOL/L (ref 0–11.9)
BASOPHILS # BLD AUTO: 0.3 % (ref 0–1.8)
BASOPHILS # BLD: 0.03 K/UL (ref 0–0.12)
BUN SERPL-MCNC: 4 MG/DL (ref 8–22)
CALCIUM SERPL-MCNC: 7.8 MG/DL (ref 8.5–10.5)
CHLORIDE SERPL-SCNC: 111 MMOL/L (ref 96–112)
CO2 SERPL-SCNC: 20 MMOL/L (ref 20–33)
CREAT SERPL-MCNC: 0.48 MG/DL (ref 0.5–1.4)
EOSINOPHIL # BLD AUTO: 0.26 K/UL (ref 0–0.51)
EOSINOPHIL NFR BLD: 2.9 % (ref 0–6.9)
ERYTHROCYTE [DISTWIDTH] IN BLOOD BY AUTOMATED COUNT: 59 FL (ref 35.9–50)
GLUCOSE SERPL-MCNC: 91 MG/DL (ref 65–99)
HCT VFR BLD AUTO: 23 % (ref 37–47)
HCT VFR BLD AUTO: 24.4 % (ref 37–47)
HGB BLD-MCNC: 7.1 G/DL (ref 12–16)
HGB BLD-MCNC: 7.7 G/DL (ref 12–16)
IMM GRANULOCYTES # BLD AUTO: 0.05 K/UL (ref 0–0.11)
IMM GRANULOCYTES NFR BLD AUTO: 0.6 % (ref 0–0.9)
LYMPHOCYTES # BLD AUTO: 2.29 K/UL (ref 1–4.8)
LYMPHOCYTES NFR BLD: 25.4 % (ref 22–41)
MCH RBC QN AUTO: 31.3 PG (ref 27–33)
MCHC RBC AUTO-ENTMCNC: 30.9 G/DL (ref 33.6–35)
MCV RBC AUTO: 101.3 FL (ref 81.4–97.8)
MONOCYTES # BLD AUTO: 0.79 K/UL (ref 0–0.85)
MONOCYTES NFR BLD AUTO: 8.7 % (ref 0–13.4)
NEUTROPHILS # BLD AUTO: 5.61 K/UL (ref 2–7.15)
NEUTROPHILS NFR BLD: 62.1 % (ref 44–72)
NRBC # BLD AUTO: 0.02 K/UL
NRBC BLD-RTO: 0.2 /100 WBC
PATHOLOGY CONSULT NOTE: NORMAL
PLATELET # BLD AUTO: 242 K/UL (ref 164–446)
PMV BLD AUTO: 9.6 FL (ref 9–12.9)
POTASSIUM SERPL-SCNC: 4.1 MMOL/L (ref 3.6–5.5)
RBC # BLD AUTO: 2.27 M/UL (ref 4.2–5.4)
SODIUM SERPL-SCNC: 140 MMOL/L (ref 135–145)
WBC # BLD AUTO: 9 K/UL (ref 4.8–10.8)

## 2019-07-29 PROCEDURE — A9270 NON-COVERED ITEM OR SERVICE: HCPCS | Performed by: INTERNAL MEDICINE

## 2019-07-29 PROCEDURE — 700102 HCHG RX REV CODE 250 W/ 637 OVERRIDE(OP): Performed by: INTERNAL MEDICINE

## 2019-07-29 PROCEDURE — 36415 COLL VENOUS BLD VENIPUNCTURE: CPT

## 2019-07-29 PROCEDURE — 80048 BASIC METABOLIC PNL TOTAL CA: CPT

## 2019-07-29 PROCEDURE — 85018 HEMOGLOBIN: CPT

## 2019-07-29 PROCEDURE — 85014 HEMATOCRIT: CPT

## 2019-07-29 PROCEDURE — 85025 COMPLETE CBC W/AUTO DIFF WBC: CPT

## 2019-07-29 PROCEDURE — 99239 HOSP IP/OBS DSCHRG MGMT >30: CPT | Performed by: INTERNAL MEDICINE

## 2019-07-29 RX ORDER — SUCRALFATE 1 G/1
1 TABLET ORAL EVERY 6 HOURS
Qty: 120 TAB | Refills: 3 | Status: SHIPPED | OUTPATIENT
Start: 2019-07-29 | End: 2019-08-03

## 2019-07-29 RX ORDER — POTASSIUM CHLORIDE 20 MEQ/1
20 TABLET, EXTENDED RELEASE ORAL DAILY
Qty: 30 TAB | Refills: 1 | Status: SHIPPED | OUTPATIENT
Start: 2019-07-30 | End: 2019-08-03

## 2019-07-29 RX ORDER — OMEPRAZOLE 20 MG/1
20 CAPSULE, DELAYED RELEASE ORAL 2 TIMES DAILY
Qty: 60 CAP | Refills: 1 | Status: SHIPPED | OUTPATIENT
Start: 2019-07-29 | End: 2019-08-03

## 2019-07-29 RX ADMIN — LIOTHYRONINE SODIUM 5 MCG: 5 TABLET ORAL at 06:26

## 2019-07-29 RX ADMIN — POTASSIUM CHLORIDE 20 MEQ: 1500 TABLET, EXTENDED RELEASE ORAL at 06:26

## 2019-07-29 RX ADMIN — LEVOTHYROXINE SODIUM 88 MCG: 88 TABLET ORAL at 06:26

## 2019-07-29 RX ADMIN — SUCRALFATE 1 G: 1 TABLET ORAL at 11:15

## 2019-07-29 RX ADMIN — SUCRALFATE 1 G: 1 TABLET ORAL at 06:26

## 2019-07-29 NOTE — DISCHARGE INSTRUCTIONS
Discharge Instructions    Discharged to home by car with relative. Discharged via wheelchair, hospital escort: Yes.  Special equipment needed: Not Applicable    Be sure to schedule a follow-up appointment with your primary care doctor or any specialists as instructed.     Discharge Plan:   Diet Plan: Discussed  Activity Level: Discussed  Confirmed Follow up Appointment: Appointment Scheduled  Confirmed Symptoms Management: Discussed  Medication Reconciliation Updated: Yes  Pneumococcal Vaccine Administered/Refused: Not given - Patient refused pneumococcal vaccine (states already recieved. No record on file)  Influenza Vaccine Indication: Indicated: Not available from distributor/    I understand that a diet low in cholesterol, fat, and sodium is recommended for good health. Unless I have been given specific instructions below for another diet, I accept this instruction as my diet prescription.   Other diet: GI Soft    Special Instructions:     Discharge Instructions per Cornelia Mauro D.O.    Follow-up with primary care provider/discharge clinic in 1 week  GI as scheduled    DIET: GI soft diet    ACTIVITY: As tolerated    DIAGNOSIS: Diverticular bleed, acute anemia secondary to acute blood loss requiring transfusion    Return to ER if recurrent bleed, weakness or dizziness.    · Is patient discharged on Warfarin / Coumadin?   No       Diverticulosis  Diverticulosis is a common condition that develops when small pouches (diverticula) form in the wall of the colon. The risk of diverticulosis increases with age. It happens more often in people who eat a low-fiber diet. Most individuals with diverticulosis have no symptoms. Those individuals with symptoms usually experience abdominal pain, constipation, or diarrhea.   Eating a high-fiber diet has been shown to reduce discomfort and other symptoms of diverticulosis. Fiber may also slow the progression of diverticulosis. Foods having high fiber content  include:  · Baked beans, kidney beans, split peas, lentils.  · Bran cereals, shredded wheat, bran muffins, whole-grain breads.  · Fresh fruit, raisins, prunes.  · Potatoes (with skin), broccoli, spinach, zucchini.  You may feel a little bloated when you introduce more fiber into your diet. These symptoms usually pass in time. Drink enough water and fluids to keep your urine clear or pale yellow, to prevent constipation. Try not to strain when you have a bowel movement. Some caregivers may recommend avoiding nuts and seeds to prevent complications of diverticulosis.  Mild pain medicine may help soothe pain and spasms. Only take over-the-counter or prescription medicines for pain, discomfort, or fever as directed by your caregiver.  Some complications of diverticulosis include an infection of the diverticula (diverticulitis), rectal bleeding, or blockage of the colon (bowel obstruction).  SEEK IMMEDIATE MEDICAL CARE IF:   · You develop increasing pain or severe bloating.  · You have an oral temperature above 102° F (38.9° C), not controlled by medicine.  · You develop vomiting or bowel movements that are bloody or black.  Document Released: 12/18/2006 Document Revised: 03/11/2013 Document Reviewed: 05/18/2011  [employer]® Patient Information ©2014 [employer].      Bloody Stools  Bloody stools means there is blood in your poop (stool). It is a sign that there is a problem somewhere in the digestive system. It is important for your doctor to find the cause of your bleeding, so the problem can be treated.   HOME CARE  · Only take medicine as told by your doctor.  · Eat foods with fiber (prunes, bran cereals).  · Drink enough fluids to keep your pee (urine) clear or pale yellow.  · Sit in warm water (sitz bath) for 10 to 15 minutes as told by your doctor.  · Know how to take your medicines (enemas, suppositories) if advised by your doctor.  · Watch for signs that you are getting better or getting worse.  GET HELP RIGHT  AWAY IF:   · You are not getting better.  · You start to get better but then get worse again.  · You have new problems.  · You have severe bleeding from the place where poop comes out (rectum) that does not stop.  · You throw up (vomit) blood.  · You feel weak or pass out (faint).  · You have a fever.  MAKE SURE YOU:   · Understand these instructions.  · Will watch your condition.  · Will get help right away if you are not doing well or get worse.  Document Released: 12/06/2010 Document Revised: 03/11/2013 Document Reviewed: 05/04/2012  ExitCare® Patient Information ©2014 [employer].      Omeprazole capsules (sprinkle caps) - Rx  What is this medicine?  OMEPRAZOLE (oh ME pray zol) prevents the production of acid in the stomach. It is used to treat gastroesophageal reflux disease (GERD), ulcers, certain bacteria in the stomach, inflammation of the esophagus, and Zollinger-Velasquez Syndrome. It is also used to treat other conditions that cause too much stomach acid.  This medicine may be used for other purposes; ask your health care provider or pharmacist if you have questions.  COMMON BRAND NAME(S): Prilosec  What should I tell my health care provider before I take this medicine?  They need to know if you have any of these conditions:  -liver disease  -low levels of magnesium in the blood  -lupus  -an unusual or allergic reaction to omeprazole, other medicines, foods, dyes, or preservatives  -pregnant or trying to get pregnant  -breast-feeding  How should I use this medicine?  Take this medicine by mouth with a glass of water. Follow the directions on the prescription label. Do not crush, break or chew the capsules. They can be opened and the contents sprinkled on a small amount of applesauce or yogurt, given with fruit juices, or swallowed immediately with water. This medicine works best if taken on an empty stomach 30 to 60 minutes before breakfast. Take your doses at regular intervals. Do not take your medicine  more often than directed.  Talk to your pediatrician regarding the use of this medicine in children. Special care may be needed.  Overdosage: If you think you have taken too much of this medicine contact a poison control center or emergency room at once.  NOTE: This medicine is only for you. Do not share this medicine with others.  What if I miss a dose?  If you miss a dose, take it as soon as you can. If it is almost time for your next dose, take only that dose. Do not take double or extra doses.  What may interact with this medicine?  Do not take this medicine with any of the following medications:  -atazanavir  -clopidogrel  -nelfinavir  This medicine may also interact with the following medications:  -ampicillin  -certain medicines for anxiety or sleep  -certain medicines that treat or prevent blood clots like warfarin  -cyclosporine  -diazepam  -digoxin  -disulfiram  -diuretics  -iron salts  -methotrexate  -mycophenolate mofetil  -phenytoin  -prescription medicine for fungal or yeast infection like itraconazole, ketoconazole, voriconazole  -saquinavir  -tacrolimus  This list may not describe all possible interactions. Give your health care provider a list of all the medicines, herbs, non-prescription drugs, or dietary supplements you use. Also tell them if you smoke, drink alcohol, or use illegal drugs. Some items may interact with your medicine.  What should I watch for while using this medicine?  It can take several days before your stomach pain gets better. Check with your doctor or health care professional if your condition does not start to get better, or if it gets worse.  You may need blood work done while you are taking this medicine.  What side effects may I notice from receiving this medicine?  Side effects that you should report to your doctor or health care professional as soon as possible:  -allergic reactions like skin rash, itching or hives, swelling of the face, lips, or tongue  -bone, muscle or  joint pain  -breathing problems  -chest pain or chest tightness  -dark yellow or brown urine  -dizziness  -fast, irregular heartbeat  -feeling faint or lightheaded  -fever or sore throat  -muscle spasm  -palpitations  -rash on cheeks or arms that gets worse in the sun  -redness, blistering, peeling or loosening of the skin, including inside the mouth  -seizures  -tremors  -unusual bleeding or bruising  -unusually weak or tired  -yellowing of the eyes or skin  Side effects that usually do not require medical attention (report to your doctor or health care professional if they continue or are bothersome):  -constipation  -diarrhea  -dry mouth  -headache  -nausea  This list may not describe all possible side effects. Call your doctor for medical advice about side effects. You may report side effects to FDA at 7-343-FDA-4288.  Where should I keep my medicine?  Keep out of the reach of children.  Store at room temperature between 15 and 30 degrees C (59 and 86 degrees F). Protect from light and moisture. Throw away any unused medicine after the expiration date.  NOTE: This sheet is a summary. It may not cover all possible information. If you have questions about this medicine, talk to your doctor, pharmacist, or health care provider.  © 2018 Elsevier/Gold Standard (2017-01-19 12:18:47)    Sucralfate tablets  What is this medicine?  SUCRALFATE (DANNY osmin fate) helps to treat ulcers of the intestine.  This medicine may be used for other purposes; ask your health care provider or pharmacist if you have questions.  COMMON BRAND NAME(S): Carafate  What should I tell my health care provider before I take this medicine?  They need to know if you have any of these conditions:  -kidney disease  -an unusual or allergic reaction to sucralfate, other medicines, foods, dyes, or preservatives  -pregnant or trying to get pregnant  -breast-feeding  How should I use this medicine?  Take this medicine by mouth with a glass of water. Follow  the directions on the prescription label. This medicine works best if you take it on an empty stomach, 1 hour before meals. Take your doses at regular intervals. Do not take your medicine more often than directed. Do not stop taking except on your doctor's advice.  Talk to your pediatrician regarding the use of this medicine in children. Special care may be needed.  Overdosage: If you think you have taken too much of this medicine contact a poison control center or emergency room at once.  NOTE: This medicine is only for you. Do not share this medicine with others.  What if I miss a dose?  If you miss a dose, take it as soon as you can. If it is almost time for your next dose, take only that dose. Do not take double or extra doses.  What may interact with this medicine?  -antacid  -cimetidine  -digoxin  -ketoconazole  -phenytoin  -quinidine  -ranitidine  -some antibiotics like ciprofloxacin, norfloxacin, and ofloxacin  -theophylline  -thyroid hormones  -warfarin  This list may not describe all possible interactions. Give your health care provider a list of all the medicines, herbs, non-prescription drugs, or dietary supplements you use. Also tell them if you smoke, drink alcohol, or use illegal drugs. Some items may interact with your medicine.  What should I watch for while using this medicine?  Visit your doctor or health care professional for regular check ups. Let your doctor know if your symptoms do not improve or if you feel worse.  Antacids should not be taken within one half hour before or after this medicine.  What side effects may I notice from receiving this medicine?  Side effects that you should report to your doctor or health care professional as soon as possible:  -allergic reactions like skin rash, itching or hives, swelling of the face, lips, or tongue  -difficulty breathing  Side effects that usually do not require medical attention (report to your doctor or health care professional if they  continue or are bothersome):  -back pain  -constipation  -drowsy, dizzy  -dry mouth  -headache  -stomach upset, gas  -trouble sleeping  This list may not describe all possible side effects. Call your doctor for medical advice about side effects. You may report side effects to FDA at 0-429-TRF-4092.  Where should I keep my medicine?  Keep out of the reach of children.  Store at room temperature between 15 and 30 degrees C (59 and 86 degrees F). Keep container tightly closed. Throw away any unused medicine after the expiration date.  NOTE: This sheet is a summary. It may not cover all possible information. If you have questions about this medicine, talk to your doctor, pharmacist, or health care provider.  © 2018 Elsevier/Gold Standard (2009-08-19 15:46:20)          Depression / Suicide Risk    As you are discharged from this [facility] facility, it is important to learn how to keep safe from harming yourself.    Recognize the warning signs:  · Abrupt changes in personality, positive or negative- including increase in energy   · Giving away possessions  · Change in eating patterns- significant weight changes-  positive or negative  · Change in sleeping patterns- unable to sleep or sleeping all the time   · Unwillingness or inability to communicate  · Depression  · Unusual sadness, discouragement and loneliness  · Talk of wanting to die  · Neglect of personal appearance   · Rebelliousness- reckless behavior  · Withdrawal from people/activities they love  · Confusion- inability to concentrate     If you or a loved one observes any of these behaviors or has concerns about self-harm, here's what you can do:  · Talk about it- your feelings and reasons for harming yourself  · Remove any means that you might use to hurt yourself (examples: pills, rope, extension cords, firearm)  · Get professional help from the community (Mental Health, Substance Abuse, psychological counseling)  · Do not be alone:Call your Safe Contact-  someone whom you trust who will be there for you.  · Call your local CRISIS HOTLINE 536-4604 or 726-032-6693  · Call your local Children's Mobile Crisis Response Team [place] (085) 755-2109 or www.Funifi  · Call the toll free National Suicide Prevention Hotlines   · National Suicide Prevention Lifeline 566-502-OQSM (4122)  · [employer] 800-SUICIDE (523-2166)

## 2019-07-29 NOTE — PROGRESS NOTES
Received report from night staff. Assumed pt care. Pain level 0/10. AOX4. POC discussed w/ patient and family at bedside. Tele monitor in place. Call light within reach, bed in lowest position, and personal items accessible.

## 2019-07-29 NOTE — PROGRESS NOTES
Assumed care at 0230, bedside report received from day shift RN. Pt. Is SR on the monitor. Initial assessment completed, orders reviewed, call light within reach, bed alarm in use, and hourly rounding in place. Pt. Asleep at this time.

## 2019-07-29 NOTE — CARE PLAN
Problem: Safety  Goal: Will remain free from injury  Outcome: PROGRESSING AS EXPECTED  Pt remains free from injury. Bed in lowest position, locked, and alarm activated. Nonskid footwear in place. Call light and personal belongings within reach. Hourly rounding.    Problem: Infection  Goal: Will remain free from infection  Outcome: PROGRESSING AS EXPECTED  Remains free from signs and symptoms of infection. Standard precautions implemented. Hand hygiene before and after contact with pt. Educated about importance of hand hygiene.

## 2019-07-29 NOTE — PROGRESS NOTES
Pt was discharged to home with spouse. Lines removed. Vital signs stable. Tele box removed. Discharge instructions reviewed and understood. All questions answered. All personal possessions with patient.

## 2019-07-29 NOTE — DISCHARGE PLANNING
Care Transition Team Assessment    In the case of an emergency, pt's NOK is Per Reinoso (spouse) [phone].     This RNCM spoke with pt at bedside and obtained the information used in this assessment. Pt verified accuracy of facesheet. Pt lives in a single story house with her . Pt uses the [facility] pharmacy on [facility]. Prior to current hospitalization, pt was completely independent with ADLS/IADLS. Pt's  drives their car to and from her doctors appoitnments. Pt collects approximately $1000/month in social security and long term. Pt denies hx of SA. Patient reports being diagnosed with dementia 6 years ago and can be forgetful at times. Pt has a wonderful familial support system. Pt has a living will and directive but does not have a copy here with her at the hospital. Pt's plan is to discharge home when medically cleared.     Upon D/C, pt states that her , Per will provide transport home, if applicable.     Information Source  Orientation : Oriented x 4  Information Given By: Patient  Informant's Name: Jayshree  Who is responsible for making decisions for patient? : Patient    Readmission Evaluation  Is this a readmission?: No    Elopement Risk  Legal Hold: No  Ambulatory or Self Mobile in Wheelchair: Yes  Disoriented: No  Psychiatric Symptoms: None  History of Wandering: No  Elopement this Admit: No  Vocalizing Wanting to Leave: No  Displays Behaviors, Body Language Wanting to Leave: No-Not at Risk for Elopement  Elopement Risk: Not at Risk for Elopement    Interdisciplinary Discharge Planning  Primary Care Physician: Wily Garcia  Lives with - Patient's Self Care Capacity: Spouse  Patient or legal guardian wants to designate a caregiver (see row info): No  Support Systems: Spouse / Significant Other, Children  Housing / Facility: 1 Story House  Do You Take your Prescribed Medications Regularly: Yes  Able to Return to Previous ADL's: Yes  Mobility Issues: No  Prior Services:  None  Patient Expects to be Discharged to:: Home  Assistance Needed: No  Durable Medical Equipment: Not Applicable    Discharge Preparedness  What is your plan after discharge?: Home with help  What are your discharge supports?: Spouse, Child  Prior Functional Level: Ambulatory, Independent with Activities of Daily Living, Independent with Medication Management  Difficulity with ADLs: None  Difficulity with IADLs: Driving  Difficulity with IADL Comments: Has license but  drives her where she needs to go.    Functional Assesment  Prior Functional Level: Ambulatory, Independent with Activities of Daily Living, Independent with Medication Management    Finances  Financial Barriers to Discharge: No  Prescription Coverage: Yes    Vision / Hearing Impairment  Right Eye Vision: Impaired, Wears Glasses  Left Eye Vision: Impaired, Wears Glasses    Advance Directive  Advance Directive?: None  Advance Directive offered?: AD Booklet refused    Domestic Abuse  Have you ever been the victim of abuse or violence?: No    Psychological Assessment  History of Substance Abuse: None  History of Psychiatric Problems: No  Non-compliant with Treatment: No  Newly Diagnosed Illness: Yes    Discharge Risks or Barriers  Discharge risks or barriers?: No    Anticipated Discharge Information  Anticipated discharge disposition: Home  Discharge Address: [address] [place] [address], [place], [address]  Discharge Contact Phone Number: [phone]

## 2019-07-29 NOTE — PROGRESS NOTES
Chart Check Complete    Monitor Summary:    Rhythm- SR    Rate- 69-87  Ectopy- x2 1.3sp, psvt  Measurements- 0.14/0.08/0.38

## 2019-07-29 NOTE — DISCHARGE SUMMARY
Discharge Summary    CHIEF COMPLAINT ON ADMISSION  Chief Complaint   Patient presents with   • Rectal Bleeding     reports she might have had some blood yesterday but woke up this morning with rectal bleeding and bright red blood in her stool       Reason for Admission  rectal bleeding     Admission Date  7/25/2019    CODE STATUS  Prior    HPI & HOSPITAL COURSE  This is a 68 y.o. female here with history of gastritis and duodenitis as well as duodenal ulcer with prior GI bleed in 2016 as well as 2018, was seen by Dr. Enamorado in the past now with complaints of rectal bleeding/diarrhea and dizziness.  She is admitted for GI bleed with acute blood loss.  Blood pressure is borderline low.      She was seen by Dr. Hardy during this admission, in which she did have a near syncopal episode after large bloody bowel movement.  She was emergently taken to endoscopy lab for EGD which revealed inflamed and thickened folds, suspicious for underlying ulcer which was biopsied.  Urology is still pending.  She will need to follow-up as an outpatient for results.  GI has recommended continued PPI twice daily as well as Carafate use.  She was found to be anemic during this admission and did require 1 unit PRBC transfusion.  H&H has remained stable.    She had a colonoscopy completed which revealed diverticulosis with likely source of bleed.  No active bleeding that required intervention.  Postprocedure, she has not had any additional bleeding.  She has been cleared by GI for discharge home.  H&H remained stable.  Patient reports improving strength.    Therefore, she is discharged in good and stable condition to home with close outpatient follow-up.    The patient met 2-midnight criteria for an inpatient stay at the time of discharge.    Discharge Date  July 29    FOLLOW UP ITEMS POST DISCHARGE    Discharge to home today  Follow-up with primary care provider/discharge clinic in 1 week  GI as scheduled    DISCHARGE DIAGNOSES  Active  Problems:    Hypokalemia POA: Yes    Rectal bleeding POA: Yes    Hypothyroid POA: Yes    Dementia POA: Yes    DNR (do not resuscitate) POA: Yes    Acute blood loss anemia POA: Yes  Resolved Problems:    * No resolved hospital problems. *      FOLLOW UP  No future appointments.  Wily Garcia M.D.  [address] [facility]  [address]  [facility] [address]  [phone]      Please call 's office to schedule an appointment within one week of discharge. Thank you      MEDICATIONS ON DISCHARGE     Medication List      START taking these medications      Instructions   omeprazole 20 MG delayed-release capsule  Commonly known as:  PRILOSEC   Take 1 Cap by mouth 2 times a day.  Dose:  20 mg     potassium chloride SA 20 MEQ Tbcr  Start taking on:  7/30/2019  Commonly known as:  Kdur   Take 1 Tab by mouth every day.  Dose:  20 mEq     sucralfate 1 GM Tabs  Commonly known as:  CARAFATE   Take 1 Tab by mouth every 6 hours.  Dose:  1 g        CONTINUE taking these medications      Instructions   CALCIUM-MAGNESIUM PO   Take 1 Tab by mouth every evening. 1000/500mg  Dose:  1 Tab     Co Q 10 100 MG Caps   Take 200 mg by mouth every morning.  Dose:  200 mg     donepezil 10 MG tablet  Commonly known as:  ARICEPT   Take 1 Tab by mouth every evening.  Dose:  10 mg     levothyroxine 88 MCG Tabs  Commonly known as:  SYNTHROID   Take 88 mcg by mouth Every morning on an empty stomach.  Dose:  88 mcg     liothyronine 5 MCG Tabs  Commonly known as:  CYTOMEL   Take 5 mcg by mouth every day.  Dose:  5 mcg     Melatonin 10 MG Tabs   Take 10 mg by mouth every evening.  Dose:  10 mg     NON SPECIFIED   Take 1 Cap by mouth every day. Blueberry extract  Dose:  1 Cap     Potassium 99 MG Tabs   Take 99 mg by mouth every evening.  Dose:  99 mg     PROBIOTIC DAILY PO   Take 1 Cap by mouth every morning.  Dose:  1 Cap     Vitamin A & D 5000-400 units Caps   Take 1 Tab by mouth every evening.  Dose:  1 Tab     VITAMIN B COMPLEX PO   Take 1 Tab by mouth every  evening.  Dose:  1 Tab     vitamin k 100 MCG tablet   Take 100 mcg by mouth every morning.  Dose:  100 mcg            Allergies  Allergies   Allergen Reactions   • Nkda [No Known Drug Allergy]        DIET  No orders of the defined types were placed in this encounter.      ACTIVITY  As tolerated.  Weight bearing as tolerated    CONSULTATIONS  GI    PROCEDURES  EGD/colonoscopy    LABORATORY  Lab Results   Component Value Date    SODIUM 140 07/29/2019    POTASSIUM 4.1 07/29/2019    CHLORIDE 111 07/29/2019    CO2 20 07/29/2019    GLUCOSE 91 07/29/2019    BUN 4 (L) 07/29/2019    CREATININE 0.48 (L) 07/29/2019    CREATININE 0.8 04/24/2009        Lab Results   Component Value Date    WBC 9.0 07/29/2019    HEMOGLOBIN 7.7 (L) 07/29/2019    HEMATOCRIT 24.4 (L) 07/29/2019    PLATELETCT 242 07/29/2019        Total time of the discharge process exceeds 50 minutes.

## 2019-07-29 NOTE — PROGRESS NOTES
Assumed pt care. Pt assisted to BR standby assist and back to bed. Plan of care discussed with pt. Verbalizes understanding. Bed in lowest position, locked, and alarm activated. Call light within reach. SR on monitor.

## 2019-07-30 ENCOUNTER — HOSPITAL ENCOUNTER (EMERGENCY)
Facility: MEDICAL CENTER | Age: 69
End: 2019-07-30
Attending: EMERGENCY MEDICINE
Payer: COMMERCIAL

## 2019-07-30 ENCOUNTER — APPOINTMENT (OUTPATIENT)
Dept: RADIOLOGY | Facility: MEDICAL CENTER | Age: 69
End: 2019-07-30
Attending: EMERGENCY MEDICINE
Payer: COMMERCIAL

## 2019-07-30 VITALS
TEMPERATURE: 97.5 F | HEIGHT: 62 IN | DIASTOLIC BLOOD PRESSURE: 65 MMHG | RESPIRATION RATE: 16 BRPM | BODY MASS INDEX: 26.65 KG/M2 | SYSTOLIC BLOOD PRESSURE: 102 MMHG | OXYGEN SATURATION: 92 % | HEART RATE: 65 BPM

## 2019-07-30 DIAGNOSIS — R42 DIZZINESS: ICD-10-CM

## 2019-07-30 LAB
ABO GROUP BLD: NORMAL
ALBUMIN SERPL BCP-MCNC: 3.4 G/DL (ref 3.2–4.9)
ALBUMIN/GLOB SERPL: 1.2 G/DL
ALP SERPL-CCNC: 51 U/L (ref 30–99)
ALT SERPL-CCNC: 15 U/L (ref 2–50)
ANION GAP SERPL CALC-SCNC: 14 MMOL/L (ref 0–11.9)
APTT PPP: 25.3 SEC (ref 24.7–36)
AST SERPL-CCNC: 23 U/L (ref 12–45)
BASOPHILS # BLD AUTO: 0.5 % (ref 0–1.8)
BASOPHILS # BLD: 0.05 K/UL (ref 0–0.12)
BILIRUB SERPL-MCNC: 0.3 MG/DL (ref 0.1–1.5)
BLD GP AB SCN SERPL QL: NORMAL
BUN SERPL-MCNC: 9 MG/DL (ref 8–22)
CALCIUM SERPL-MCNC: 8.9 MG/DL (ref 8.5–10.5)
CHLORIDE SERPL-SCNC: 110 MMOL/L (ref 96–112)
CO2 SERPL-SCNC: 17 MMOL/L (ref 20–33)
CREAT SERPL-MCNC: 0.69 MG/DL (ref 0.5–1.4)
EKG IMPRESSION: NORMAL
EOSINOPHIL # BLD AUTO: 0.11 K/UL (ref 0–0.51)
EOSINOPHIL NFR BLD: 1 % (ref 0–6.9)
ERYTHROCYTE [DISTWIDTH] IN BLOOD BY AUTOMATED COUNT: 57.1 FL (ref 35.9–50)
GLOBULIN SER CALC-MCNC: 2.8 G/DL (ref 1.9–3.5)
GLUCOSE SERPL-MCNC: 111 MG/DL (ref 65–99)
HCT VFR BLD AUTO: 25.7 % (ref 37–47)
HGB BLD-MCNC: 8 G/DL (ref 12–16)
IMM GRANULOCYTES # BLD AUTO: 0.06 K/UL (ref 0–0.11)
IMM GRANULOCYTES NFR BLD AUTO: 0.6 % (ref 0–0.9)
INR PPP: 1.02 (ref 0.87–1.13)
LYMPHOCYTES # BLD AUTO: 2.37 K/UL (ref 1–4.8)
LYMPHOCYTES NFR BLD: 21.7 % (ref 22–41)
MCH RBC QN AUTO: 31 PG (ref 27–33)
MCHC RBC AUTO-ENTMCNC: 31.1 G/DL (ref 33.6–35)
MCV RBC AUTO: 99.6 FL (ref 81.4–97.8)
MONOCYTES # BLD AUTO: 1.11 K/UL (ref 0–0.85)
MONOCYTES NFR BLD AUTO: 10.2 % (ref 0–13.4)
NEUTROPHILS # BLD AUTO: 7.2 K/UL (ref 2–7.15)
NEUTROPHILS NFR BLD: 66 % (ref 44–72)
NRBC # BLD AUTO: 0 K/UL
NRBC BLD-RTO: 0 /100 WBC
PLATELET # BLD AUTO: 347 K/UL (ref 164–446)
PMV BLD AUTO: 9.8 FL (ref 9–12.9)
POTASSIUM SERPL-SCNC: 3.7 MMOL/L (ref 3.6–5.5)
PROT SERPL-MCNC: 6.2 G/DL (ref 6–8.2)
PROTHROMBIN TIME: 13.6 SEC (ref 12–14.6)
RBC # BLD AUTO: 2.58 M/UL (ref 4.2–5.4)
RH BLD: NORMAL
SODIUM SERPL-SCNC: 141 MMOL/L (ref 135–145)
TROPONIN T SERPL-MCNC: 14 NG/L (ref 6–19)
WBC # BLD AUTO: 10.9 K/UL (ref 4.8–10.8)

## 2019-07-30 PROCEDURE — 700105 HCHG RX REV CODE 258: Performed by: EMERGENCY MEDICINE

## 2019-07-30 PROCEDURE — 700117 HCHG RX CONTRAST REV CODE 255: Performed by: EMERGENCY MEDICINE

## 2019-07-30 PROCEDURE — 99285 EMERGENCY DEPT VISIT HI MDM: CPT

## 2019-07-30 PROCEDURE — 85610 PROTHROMBIN TIME: CPT

## 2019-07-30 PROCEDURE — 85025 COMPLETE CBC W/AUTO DIFF WBC: CPT

## 2019-07-30 PROCEDURE — 86901 BLOOD TYPING SEROLOGIC RH(D): CPT

## 2019-07-30 PROCEDURE — 84484 ASSAY OF TROPONIN QUANT: CPT

## 2019-07-30 PROCEDURE — 71045 X-RAY EXAM CHEST 1 VIEW: CPT

## 2019-07-30 PROCEDURE — 85730 THROMBOPLASTIN TIME PARTIAL: CPT

## 2019-07-30 PROCEDURE — 80053 COMPREHEN METABOLIC PANEL: CPT

## 2019-07-30 PROCEDURE — 93005 ELECTROCARDIOGRAM TRACING: CPT | Performed by: EMERGENCY MEDICINE

## 2019-07-30 PROCEDURE — 36415 COLL VENOUS BLD VENIPUNCTURE: CPT

## 2019-07-30 PROCEDURE — 304561 HCHG STAT O2

## 2019-07-30 PROCEDURE — 86850 RBC ANTIBODY SCREEN: CPT

## 2019-07-30 PROCEDURE — 86900 BLOOD TYPING SEROLOGIC ABO: CPT

## 2019-07-30 PROCEDURE — 70450 CT HEAD/BRAIN W/O DYE: CPT

## 2019-07-30 PROCEDURE — 74177 CT ABD & PELVIS W/CONTRAST: CPT

## 2019-07-30 RX ORDER — SODIUM CHLORIDE 9 MG/ML
500 INJECTION, SOLUTION INTRAVENOUS ONCE
Status: COMPLETED | OUTPATIENT
Start: 2019-07-30 | End: 2019-07-30

## 2019-07-30 RX ADMIN — SODIUM CHLORIDE 500 ML: 9 INJECTION, SOLUTION INTRAVENOUS at 10:00

## 2019-07-30 RX ADMIN — IOHEXOL 100 ML: 350 INJECTION, SOLUTION INTRAVENOUS at 11:35

## 2019-07-30 ASSESSMENT — LIFESTYLE VARIABLES: DO YOU DRINK ALCOHOL: NO

## 2019-07-30 NOTE — ED TRIAGE NOTES
This is a pt roomed stat for dizziness and weakness. Hx of recent admission for lower GI bleeding. Pale in color. Flat affect. A, O x4, gcs 15. Steady gait for transfer from / to [facility].  at beside. Placed on 2L NC per 88% on RA.

## 2019-07-30 NOTE — DISCHARGE INSTRUCTIONS
Return to the emergency department for any black or bloody stool, recurrent dizziness, weakness, chest pain, shortness of breath, neurologic symptoms or concern

## 2019-07-30 NOTE — ED PROVIDER NOTES
ED Provider Note    CHIEF COMPLAINT  Chief Complaint   Patient presents with   • Dizziness       HPI  Jayshree Reinoso is a 68 y.o. female who presents to the emergency department after feeling dizzy when she woke up this morning.  Was worse when she tried to ambulate.  Patient has a history of recent hospital admission for lower GI bleeding.  She was just discharged yesterday.  Patient was admitted to the hospital on 7/25.  She underwent upper and lower endoscopies.  She was identified to have ulcer, but no evidence of GI bleeding.  Advised to use a PPI twice daily with Carafate.  She had a lower endoscopy that showed diverticulitis and it was thought that diverticular bleeding was the cause.  She reports that she has not had any bloody stool since she was hospitalized.  She woke up today and she just felt a bit off balance when she was walking.  Somewhat lightheaded like she might fall down.  Denies focal weakness numbness slurred speech or confusion.  No headache.  Denies chest pain or shortness of breath.  No palpitations.  She has not had a fever.  She does state that she has had some vague abdominal discomfort over the last week.  She cannot localize this or quantify it.  Says she does not have evidently she pushes on her abdomen.  She has not had fever or dysuria.    Medical record was reviewed as summarized above..  She had CT angiograms that were negative.  Previous hemoglobin was 7.7.    REVIEW OF SYSTEMS  As per HPI, otherwise a 10 point review of systems is negative    PAST MEDICAL HISTORY  Past Medical History:   Diagnosis Date   • Alzheimer's disease    • Alzheimer's disease    • Anesthesia     PONV   • Arthritis     RA and osteo   • Backpain    • Cancer (HCC) 2010    skin melanoma   • Hypothyroid    • Other specified symptom associated with female genital organs     vaginal mesh removed   • Pain     bilateral knee   • Renal disorder     renal fatigue   • Unspecified disorder of thyroid    • Unspecified  urinary incontinence        SOCIAL HISTORY  Social History   Substance Use Topics   • Smoking status: Never Smoker   • Smokeless tobacco: Never Used   • Alcohol use No       SURGICAL HISTORY  Past Surgical History:   Procedure Laterality Date   • COLONOSCOPY - ENDO N/A 7/27/2019    Procedure: COLONOSCOPY;  Surgeon: Guru Castañeda M.D.;  Location: ENDOSCOPY [facility];  Service: Gastroenterology   • GASTROSCOPY-ENDO N/A 7/26/2019    Procedure: GASTROSCOPY;  Surgeon: Guru Castañeda M.D.;  Location: ENDOSCOPY [facility];  Service: Gastroenterology   • GASTROSCOPY-ENDO  8/11/2016    Procedure: GASTROSCOPY-ENDO;  Surgeon: Ruchi Akers M.D.;  Location: SURGERY [facility];  Service:    • KNEE ARTHROPLASTY TOTAL Right 7/11/2016    Procedure: KNEE ARTHROPLASTY TOTAL;  Surgeon: Rayray Valentine M.D.;  Location: SURGERY [facility];  Service:    • LUMBAR FUSION POSTERIOR  2/27/2013    Performed by Sonny Flores M.D. at SURGERY [facility]   • LUMBAR LAMINECTOMY DISKECTOMY  2/27/2013    Performed by Sonny Flores M.D. at SURGERY [facility]   • VAGINECTOMY  7/25/2011    Performed by MICHELLE BURT at SURGERY SAME DAY [facility] ORS   • LAPAROSCOPY  5/7/2009    Performed by RODY SAENZ at SURGERY [facility] ORS   • FEMORAL HERNIA REPAIR  5/7/2009    Performed by RODY SAENZ at SURGERY [facility] ORS   • INGUINAL HERNIA REPAIR  12/18/2008    Performed by RODY SAENZ at SURGERY SAME DAY [facility] ORS   • RECTUS REPAIR  10/9/08    Performed by EFREN REYNOLDS at SURGERY SAME DAY [facility] ORS   • SCLERAL BUCKLING  7/9/08    Performed by JORGE DAVENPORT at SURGERY SAME DAY [facility] ORS   • GYN SURGERY     • GYN SURGERY      vaginal mesh removed   • GYN SURGERY      hysterectomy   • HERNIA REPAIR         CURRENT MEDICATIONS  Home Medications     Reviewed by Schuyler B Collett, R.N. (Registered Nurse) on 07/30/19 at 1010  Med List Status: <None>   Medication Last Dose  "Status   B Complex Vitamins (VITAMIN B COMPLEX PO)  Active   CALCIUM-MAGNESIUM PO  Active   Coenzyme Q10 (CO Q 10) 100 MG Cap  Active   donepezil (ARICEPT) 10 MG tablet  Active   levothyroxine (SYNTHROID) 88 MCG Tab  Active   liothyronine (CYTOMEL) 5 MCG Tab  Active   Melatonin 10 MG Tab  Active   NON SPECIFIED  Active   omeprazole (PRILOSEC) 20 MG delayed-release capsule  Active   Potassium 99 MG Tab  Active   potassium chloride SA (KDUR) 20 MEQ Tab CR  Active   Probiotic Product (PROBIOTIC DAILY PO)  Active   sucralfate (CARAFATE) 1 GM Tab  Active   vitamin k 100 MCG tablet  Active   Vitamins A & D (VITAMIN A & D) 5000-400 UNITS Cap  Active                ALLERGIES  Allergies   Allergen Reactions   • Nkda [No Known Drug Allergy]        PHYSICAL EXAM  VITAL SIGNS: /65   Pulse 65   Temp 36.4 °C (97.5 °F) (Temporal)   Resp 16   Ht 1.575 m (5' 2\")   SpO2 92%   BMI 26.65 kg/m²    Constitutional: Awake and alert.  Elderly female who is somewhat confused about the recent events.  Generally pale appearing  HENT:  Atraumatic, Normocephalic.Oropharynx moist mucus membranes, Nose normal inspection.   Eyes: Normal inspection  Neck: Supple  Cardiovascular: Normal heart rate, Normal rhythm.  Symmetric peripheral pulses.   Thorax & Lungs: No respiratory distress, No wheezing, No rales, No rhonchi, No chest tenderness.   Abdomen: Bowel sounds normal, soft, non-distended, minimal if any tenderness over the left lower quadrant.  No rebound or peritonitis  Skin: Warm, Dry, No rash.   Extremities: No clubbing, cyanosis, edema, no Homans or cords   Neurologic: Grossly normal   Psychiatric: Anxious appearing    RADIOLOGY/PROCEDURES  CT-ABDOMEN-PELVIS WITH   Final Result      1.  No acute abnormality of the abdomen or pelvis.   2.  Colonic diverticulosis without evidence of diverticulitis.   3.  Status post cholecystectomy.   4.  Status post hysterectomy.   5.  Atherosclerosis.      CT-HEAD W/O   Final Result      1.  No " evidence of acute territorial infarct, intracranial hemorrhage or mass lesion.   2.  Mild diffuse cerebral substance loss.   3.  Mild microangiopathic ischemic change versus demyelination or gliosis.      DX-CHEST-PORTABLE (1 VIEW)   Final Result      No acute cardiopulmonary process is seen.      Atherosclerotic plaque.           Imaging is interpreted by radiologist    Labs:  Results for orders placed or performed during the hospital encounter of 07/30/19   CBC WITH DIFFERENTIAL   Result Value Ref Range    WBC 10.9 (H) 4.8 - 10.8 K/uL    RBC 2.58 (L) 4.20 - 5.40 M/uL    Hemoglobin 8.0 (L) 12.0 - 16.0 g/dL    Hematocrit 25.7 (L) 37.0 - 47.0 %    MCV 99.6 (H) 81.4 - 97.8 fL    MCH 31.0 27.0 - 33.0 pg    MCHC 31.1 (L) 33.6 - 35.0 g/dL    RDW 57.1 (H) 35.9 - 50.0 fL    Platelet Count 347 164 - 446 K/uL    MPV 9.8 9.0 - 12.9 fL    Neutrophils-Polys 66.00 44.00 - 72.00 %    Lymphocytes 21.70 (L) 22.00 - 41.00 %    Monocytes 10.20 0.00 - 13.40 %    Eosinophils 1.00 0.00 - 6.90 %    Basophils 0.50 0.00 - 1.80 %    Immature Granulocytes 0.60 0.00 - 0.90 %    Nucleated RBC 0.00 /100 WBC    Neutrophils (Absolute) 7.20 (H) 2.00 - 7.15 K/uL    Lymphs (Absolute) 2.37 1.00 - 4.80 K/uL    Monos (Absolute) 1.11 (H) 0.00 - 0.85 K/uL    Eos (Absolute) 0.11 0.00 - 0.51 K/uL    Baso (Absolute) 0.05 0.00 - 0.12 K/uL    Immature Granulocytes (abs) 0.06 0.00 - 0.11 K/uL    NRBC (Absolute) 0.00 K/uL   COMP METABOLIC PANEL   Result Value Ref Range    Sodium 141 135 - 145 mmol/L    Potassium 3.7 3.6 - 5.5 mmol/L    Chloride 110 96 - 112 mmol/L    Co2 17 (L) 20 - 33 mmol/L    Anion Gap 14.0 (H) 0.0 - 11.9    Glucose 111 (H) 65 - 99 mg/dL    Bun 9 8 - 22 mg/dL    Creatinine 0.69 0.50 - 1.40 mg/dL    Calcium 8.9 8.5 - 10.5 mg/dL    AST(SGOT) 23 12 - 45 U/L    ALT(SGPT) 15 2 - 50 U/L    Alkaline Phosphatase 51 30 - 99 U/L    Total Bilirubin 0.3 0.1 - 1.5 mg/dL    Albumin 3.4 3.2 - 4.9 g/dL    Total Protein 6.2 6.0 - 8.2 g/dL    Globulin 2.8 1.9  - 3.5 g/dL    A-G Ratio 1.2 g/dL   PROTHROMBIN TIME (INR)   Result Value Ref Range    PT 13.6 12.0 - 14.6 sec    INR 1.02 0.87 - 1.13   APTT   Result Value Ref Range    APTT 25.3 24.7 - 36.0 sec   COD (ADULT)   Result Value Ref Range    ABO Grouping Only O     Rh Grouping Only POS     Antibody Screen-Cod NEG    TROPONIN   Result Value Ref Range    Troponin T 14 6 - 19 ng/L   ESTIMATED GFR   Result Value Ref Range    GFR If African American >60 >60 mL/min/1.73 m 2    GFR If Non African American >60 >60 mL/min/1.73 m 2   EKG (NOW)   Result Value Ref Range    Report       [facility] Emergency Dept.    Test Date:  2019  Pt Name:    SHIRA BILLINGS                 Department: ER  MRN:        [id number]                      Room:       [facility]  Gender:     Female                       Technician: [id number]  :        [id number]                   Requested By:TONI SULLIVAN  Order #:    [id number]                    Reading MD: TONI SULLIVAN MD    Measurements  Intervals                                Axis  Rate:       70                           P:          54  LA:         162                          QRS:        -26  QRSD:       87                           T:          -4  QT:         409  QTc:        442    Interpretive Statements  Sinus rhythm  Borderline left axis deviation  Low voltage, precordial leads  Compared to ECG 2016 10:37:19  Low QRS voltage now present    Electronically Signed On 2019 11:54:18 PDT by TONI SULLIVAN MD         Medications   NS infusion 500 mL (0 mL Intravenous Stopped 19 1100)   iohexol (OMNIPAQUE) 350 mg/mL (100 mL Intravenous Given 19 1135)       COURSE & MEDICAL DECISION MAKING  Patient presents to the ER after an episode of dizziness.  Dizziness is postural by history.  Her blood pressure was borderline upon arrival and appeared pale.  She was brought back to her room.  An IV was started.  She was given 500 cc of saline.  Her chart was reviewed  as noted above.  History of recent GI bleeding.  She had not had any bowel movement.  No black or bloody stool.  No vomiting.  She had vague if any tenderness in her left lower quadrant.  She does have a history of diverticulosis and also had a recent colonoscopy.  It is felt that CT scan of the abdomen and pelvis was needed.  Laboratory data returned demonstrating improving hemoglobin now up to 8 from 7.7 yesterday.  Her WBC count is generally stable at 10.9 where it was at 9 yesterday.  She does not have an elevated BUN.  Her CO2 was 17.  This is relatively stable from previous numbers over the last few days.  CT scan of the abdomen returned negative.  I obtain CT scan of the head with dizziness and unsteadiness.  This was also negative.  Patient reported that she felt fine after observation in the ER.  Her orthostatics were normal.  She is ambulatory without difficulty while here in the ER.  Neurologically she is intact.  She and her family would like to go home.  This seems reasonable as I do not see a life-threatening condition.  I have stressed that if the patient has any black or bloody stool, pain, fevers, neurologic symptoms she should immediately return to the ER.  She and her  voiced understanding and the patient was discharged.      FINAL IMPRESSION  1.  Postural dizziness  2.  History of recent GI bleeding    This dictation was created using voice recognition software. The accuracy of the dictation is limited to the abilities of the software.  The nursing notes were reviewed and certain aspects of this information were incorporated into this note.      Electronically signed by: John Clark, 7/30/2019 2:15 PM

## 2019-07-30 NOTE — ED NOTES
Pt provided with discharge instructions, instructions for follow up appointment with PCP, s/s of when to seek emergency care.  Pt verbalizes understanding.  Pt discharged in good condition.

## 2019-07-30 NOTE — ED NOTES
IV started per orders. Pt tolerated procedure well, resting comfortably at this time. Labs sent. Pt educated about plan of care.

## 2019-08-03 ENCOUNTER — APPOINTMENT (OUTPATIENT)
Dept: RADIOLOGY | Facility: MEDICAL CENTER | Age: 69
DRG: 378 | End: 2019-08-03
Attending: STUDENT IN AN ORGANIZED HEALTH CARE EDUCATION/TRAINING PROGRAM
Payer: COMMERCIAL

## 2019-08-03 ENCOUNTER — APPOINTMENT (OUTPATIENT)
Dept: RADIOLOGY | Facility: MEDICAL CENTER | Age: 69
DRG: 378 | End: 2019-08-03
Attending: EMERGENCY MEDICINE
Payer: COMMERCIAL

## 2019-08-03 ENCOUNTER — HOSPITAL ENCOUNTER (INPATIENT)
Facility: MEDICAL CENTER | Age: 69
LOS: 1 days | DRG: 378 | End: 2019-08-05
Attending: EMERGENCY MEDICINE | Admitting: INTERNAL MEDICINE
Payer: COMMERCIAL

## 2019-08-03 PROBLEM — R42 DIZZINESS: Status: ACTIVE | Noted: 2019-08-03

## 2019-08-03 PROBLEM — R60.0 EDEMA, LOWER EXTREMITY: Status: ACTIVE | Noted: 2019-08-03

## 2019-08-03 LAB
ABO GROUP BLD: NORMAL
ALBUMIN SERPL BCP-MCNC: 3.4 G/DL (ref 3.2–4.9)
ALBUMIN/GLOB SERPL: 1.4 G/DL
ALP SERPL-CCNC: 54 U/L (ref 30–99)
ALT SERPL-CCNC: 14 U/L (ref 2–50)
ANION GAP SERPL CALC-SCNC: 9 MMOL/L (ref 0–11.9)
APTT PPP: 24 SEC (ref 24.7–36)
AST SERPL-CCNC: 17 U/L (ref 12–45)
BARCODED ABORH UBTYP: 5100
BARCODED ABORH UBTYP: 5100
BARCODED PRD CODE UBPRD: NORMAL
BARCODED PRD CODE UBPRD: NORMAL
BARCODED UNIT NUM UBUNT: NORMAL
BARCODED UNIT NUM UBUNT: NORMAL
BASOPHILS # BLD AUTO: 0.4 % (ref 0–1.8)
BASOPHILS # BLD: 0.03 K/UL (ref 0–0.12)
BILIRUB SERPL-MCNC: 0.3 MG/DL (ref 0.1–1.5)
BLD GP AB SCN SERPL QL: NORMAL
BUN SERPL-MCNC: 19 MG/DL (ref 8–22)
CALCIUM SERPL-MCNC: 8.1 MG/DL (ref 8.5–10.5)
CHLORIDE SERPL-SCNC: 111 MMOL/L (ref 96–112)
CO2 SERPL-SCNC: 21 MMOL/L (ref 20–33)
COMPONENT R 8504R: NORMAL
COMPONENT R 8504R: NORMAL
CREAT SERPL-MCNC: 0.58 MG/DL (ref 0.5–1.4)
EOSINOPHIL # BLD AUTO: 0.08 K/UL (ref 0–0.51)
EOSINOPHIL NFR BLD: 1 % (ref 0–6.9)
ERYTHROCYTE [DISTWIDTH] IN BLOOD BY AUTOMATED COUNT: 56.3 FL (ref 35.9–50)
ERYTHROCYTE [DISTWIDTH] IN BLOOD BY AUTOMATED COUNT: 62.4 FL (ref 35.9–50)
FIBRINOGEN PPP-MCNC: 356 MG/DL (ref 215–460)
GLOBULIN SER CALC-MCNC: 2.4 G/DL (ref 1.9–3.5)
GLUCOSE SERPL-MCNC: 86 MG/DL (ref 65–99)
HCT VFR BLD AUTO: 21 % (ref 37–47)
HCT VFR BLD AUTO: 22.5 % (ref 37–47)
HCT VFR BLD AUTO: 24.9 % (ref 37–47)
HGB BLD-MCNC: 6.5 G/DL (ref 12–16)
HGB BLD-MCNC: 7.1 G/DL (ref 12–16)
HGB BLD-MCNC: 7.4 G/DL (ref 12–16)
IMM GRANULOCYTES # BLD AUTO: 0.02 K/UL (ref 0–0.11)
IMM GRANULOCYTES NFR BLD AUTO: 0.2 % (ref 0–0.9)
INR PPP: 0.99 (ref 0.87–1.13)
LIPASE SERPL-CCNC: 23 U/L (ref 11–82)
LYMPHOCYTES # BLD AUTO: 2.35 K/UL (ref 1–4.8)
LYMPHOCYTES NFR BLD: 28.6 % (ref 22–41)
MCH RBC QN AUTO: 29.7 PG (ref 27–33)
MCH RBC QN AUTO: 31.3 PG (ref 27–33)
MCHC RBC AUTO-ENTMCNC: 29.7 G/DL (ref 33.6–35)
MCHC RBC AUTO-ENTMCNC: 31 G/DL (ref 33.6–35)
MCV RBC AUTO: 100 FL (ref 81.4–97.8)
MCV RBC AUTO: 101 FL (ref 81.4–97.8)
MONOCYTES # BLD AUTO: 0.64 K/UL (ref 0–0.85)
MONOCYTES NFR BLD AUTO: 7.8 % (ref 0–13.4)
NEUTROPHILS # BLD AUTO: 5.09 K/UL (ref 2–7.15)
NEUTROPHILS NFR BLD: 62 % (ref 44–72)
NRBC # BLD AUTO: 0 K/UL
NRBC BLD-RTO: 0 /100 WBC
PLATELET # BLD AUTO: 366 K/UL (ref 164–446)
PLATELET # BLD AUTO: 410 K/UL (ref 164–446)
PMV BLD AUTO: 9.7 FL (ref 9–12.9)
PMV BLD AUTO: 9.8 FL (ref 9–12.9)
POTASSIUM SERPL-SCNC: 4 MMOL/L (ref 3.6–5.5)
PRODUCT TYPE UPROD: NORMAL
PRODUCT TYPE UPROD: NORMAL
PROT SERPL-MCNC: 5.8 G/DL (ref 6–8.2)
PROTHROMBIN TIME: 13.2 SEC (ref 12–14.6)
RBC # BLD AUTO: 2.08 M/UL (ref 4.2–5.4)
RBC # BLD AUTO: 2.49 M/UL (ref 4.2–5.4)
RH BLD: NORMAL
SODIUM SERPL-SCNC: 141 MMOL/L (ref 135–145)
UNIT STATUS USTAT: NORMAL
UNIT STATUS USTAT: NORMAL
WBC # BLD AUTO: 8.2 K/UL (ref 4.8–10.8)
WBC # BLD AUTO: 9.6 K/UL (ref 4.8–10.8)

## 2019-08-03 PROCEDURE — 85610 PROTHROMBIN TIME: CPT

## 2019-08-03 PROCEDURE — 94760 N-INVAS EAR/PLS OXIMETRY 1: CPT

## 2019-08-03 PROCEDURE — 85027 COMPLETE CBC AUTOMATED: CPT

## 2019-08-03 PROCEDURE — 99285 EMERGENCY DEPT VISIT HI MDM: CPT

## 2019-08-03 PROCEDURE — 700102 HCHG RX REV CODE 250 W/ 637 OVERRIDE(OP): Performed by: STUDENT IN AN ORGANIZED HEALTH CARE EDUCATION/TRAINING PROGRAM

## 2019-08-03 PROCEDURE — 71045 X-RAY EXAM CHEST 1 VIEW: CPT

## 2019-08-03 PROCEDURE — 86923 COMPATIBILITY TEST ELECTRIC: CPT

## 2019-08-03 PROCEDURE — 96372 THER/PROPH/DIAG INJ SC/IM: CPT

## 2019-08-03 PROCEDURE — 86850 RBC ANTIBODY SCREEN: CPT

## 2019-08-03 PROCEDURE — A9270 NON-COVERED ITEM OR SERVICE: HCPCS | Performed by: STUDENT IN AN ORGANIZED HEALTH CARE EDUCATION/TRAINING PROGRAM

## 2019-08-03 PROCEDURE — P9016 RBC LEUKOCYTES REDUCED: HCPCS

## 2019-08-03 PROCEDURE — 74174 CTA ABD&PLVS W/CONTRAST: CPT

## 2019-08-03 PROCEDURE — 99220 PR INITIAL OBSERVATION CARE,LEVL III: CPT | Mod: GC | Performed by: INTERNAL MEDICINE

## 2019-08-03 PROCEDURE — 85018 HEMOGLOBIN: CPT

## 2019-08-03 PROCEDURE — 85384 FIBRINOGEN ACTIVITY: CPT

## 2019-08-03 PROCEDURE — 83690 ASSAY OF LIPASE: CPT

## 2019-08-03 PROCEDURE — 96375 TX/PRO/DX INJ NEW DRUG ADDON: CPT

## 2019-08-03 PROCEDURE — 80053 COMPREHEN METABOLIC PANEL: CPT

## 2019-08-03 PROCEDURE — 86901 BLOOD TYPING SEROLOGIC RH(D): CPT

## 2019-08-03 PROCEDURE — 85025 COMPLETE CBC W/AUTO DIFF WBC: CPT

## 2019-08-03 PROCEDURE — 86900 BLOOD TYPING SEROLOGIC ABO: CPT

## 2019-08-03 PROCEDURE — C9113 INJ PANTOPRAZOLE SODIUM, VIA: HCPCS | Performed by: STUDENT IN AN ORGANIZED HEALTH CARE EDUCATION/TRAINING PROGRAM

## 2019-08-03 PROCEDURE — G0378 HOSPITAL OBSERVATION PER HR: HCPCS

## 2019-08-03 PROCEDURE — 85730 THROMBOPLASTIN TIME PARTIAL: CPT

## 2019-08-03 PROCEDURE — 30233N1 TRANSFUSION OF NONAUTOLOGOUS RED BLOOD CELLS INTO PERIPHERAL VEIN, PERCUTANEOUS APPROACH: ICD-10-PCS | Performed by: INTERNAL MEDICINE

## 2019-08-03 PROCEDURE — 700111 HCHG RX REV CODE 636 W/ 250 OVERRIDE (IP): Performed by: STUDENT IN AN ORGANIZED HEALTH CARE EDUCATION/TRAINING PROGRAM

## 2019-08-03 PROCEDURE — 36415 COLL VENOUS BLD VENIPUNCTURE: CPT

## 2019-08-03 PROCEDURE — 700117 HCHG RX CONTRAST REV CODE 255: Performed by: STUDENT IN AN ORGANIZED HEALTH CARE EDUCATION/TRAINING PROGRAM

## 2019-08-03 PROCEDURE — 85014 HEMATOCRIT: CPT

## 2019-08-03 PROCEDURE — 36430 TRANSFUSION BLD/BLD COMPNT: CPT

## 2019-08-03 PROCEDURE — 700105 HCHG RX REV CODE 258: Performed by: STUDENT IN AN ORGANIZED HEALTH CARE EDUCATION/TRAINING PROGRAM

## 2019-08-03 RX ORDER — SODIUM CHLORIDE 9 MG/ML
INJECTION, SOLUTION INTRAVENOUS CONTINUOUS
Status: DISCONTINUED | OUTPATIENT
Start: 2019-08-03 | End: 2019-08-05 | Stop reason: HOSPADM

## 2019-08-03 RX ORDER — OMEPRAZOLE 20 MG/1
20 CAPSULE, DELAYED RELEASE ORAL EVERY EVENING
COMMUNITY
End: 2022-11-01

## 2019-08-03 RX ORDER — SODIUM CHLORIDE 9 MG/ML
INJECTION, SOLUTION INTRAVENOUS CONTINUOUS
Status: DISCONTINUED | OUTPATIENT
Start: 2019-08-03 | End: 2019-08-03

## 2019-08-03 RX ORDER — PHENOL 1.4 %
10 AEROSOL, SPRAY (ML) MUCOUS MEMBRANE EVERY EVENING
Status: DISCONTINUED | OUTPATIENT
Start: 2019-08-03 | End: 2019-08-03

## 2019-08-03 RX ORDER — PANTOPRAZOLE SODIUM 40 MG/10ML
40 INJECTION, POWDER, LYOPHILIZED, FOR SOLUTION INTRAVENOUS 2 TIMES DAILY
Status: DISCONTINUED | OUTPATIENT
Start: 2019-08-03 | End: 2019-08-05 | Stop reason: HOSPADM

## 2019-08-03 RX ORDER — SUCRALFATE 1 G/1
1 TABLET ORAL 3 TIMES DAILY
Status: DISCONTINUED | OUTPATIENT
Start: 2019-08-03 | End: 2019-08-05 | Stop reason: HOSPADM

## 2019-08-03 RX ORDER — LEVOTHYROXINE SODIUM 0.1 MG/1
100 TABLET ORAL
Status: DISCONTINUED | OUTPATIENT
Start: 2019-08-04 | End: 2019-08-05 | Stop reason: HOSPADM

## 2019-08-03 RX ORDER — AMOXICILLIN 250 MG
2 CAPSULE ORAL 2 TIMES DAILY
Status: DISCONTINUED | OUTPATIENT
Start: 2019-08-03 | End: 2019-08-05 | Stop reason: HOSPADM

## 2019-08-03 RX ORDER — POLYETHYLENE GLYCOL 3350 17 G/17G
1 POWDER, FOR SOLUTION ORAL
Status: DISCONTINUED | OUTPATIENT
Start: 2019-08-03 | End: 2019-08-05 | Stop reason: HOSPADM

## 2019-08-03 RX ORDER — DONEPEZIL HYDROCHLORIDE 5 MG/1
10 TABLET, FILM COATED ORAL EVERY EVENING
Status: DISCONTINUED | OUTPATIENT
Start: 2019-08-03 | End: 2019-08-05 | Stop reason: HOSPADM

## 2019-08-03 RX ORDER — SUCRALFATE 1 G/1
1 TABLET ORAL 3 TIMES DAILY
COMMUNITY
End: 2022-11-01

## 2019-08-03 RX ORDER — BISACODYL 10 MG
10 SUPPOSITORY, RECTAL RECTAL
Status: DISCONTINUED | OUTPATIENT
Start: 2019-08-03 | End: 2019-08-05 | Stop reason: HOSPADM

## 2019-08-03 RX ORDER — PANTOPRAZOLE SODIUM 40 MG/10ML
40 INJECTION, POWDER, LYOPHILIZED, FOR SOLUTION INTRAVENOUS 2 TIMES DAILY
Status: DISCONTINUED | OUTPATIENT
Start: 2019-08-03 | End: 2019-08-03

## 2019-08-03 RX ORDER — LORAZEPAM 2 MG/ML
0.5 INJECTION INTRAMUSCULAR ONCE
Status: COMPLETED | OUTPATIENT
Start: 2019-08-03 | End: 2019-08-03

## 2019-08-03 RX ADMIN — LORAZEPAM 0.5 MG: 2 INJECTION INTRAMUSCULAR; INTRAVENOUS at 15:35

## 2019-08-03 RX ADMIN — IOHEXOL 100 ML: 350 INJECTION, SOLUTION INTRAVENOUS at 16:15

## 2019-08-03 RX ADMIN — SODIUM CHLORIDE: 9 INJECTION, SOLUTION INTRAVENOUS at 16:23

## 2019-08-03 RX ADMIN — DONEPEZIL HYDROCHLORIDE 10 MG: 5 TABLET, FILM COATED ORAL at 17:06

## 2019-08-03 RX ADMIN — PANTOPRAZOLE SODIUM 40 MG: 40 INJECTION, POWDER, LYOPHILIZED, FOR SOLUTION INTRAVENOUS at 17:44

## 2019-08-03 RX ADMIN — SUCRALFATE 1 G: 1 TABLET ORAL at 16:23

## 2019-08-03 ASSESSMENT — PATIENT HEALTH QUESTIONNAIRE - PHQ9
SUM OF ALL RESPONSES TO PHQ9 QUESTIONS 1 AND 2: 0
1. LITTLE INTEREST OR PLEASURE IN DOING THINGS: NOT AT ALL
2. FEELING DOWN, DEPRESSED, IRRITABLE, OR HOPELESS: NOT AT ALL

## 2019-08-03 ASSESSMENT — LIFESTYLE VARIABLES
TOTAL SCORE: 0
EVER FELT BAD OR GUILTY ABOUT YOUR DRINKING: NO
TOTAL SCORE: 0
TOTAL SCORE: 0
CONSUMPTION TOTAL: NEGATIVE
ON A TYPICAL DAY WHEN YOU DRINK ALCOHOL HOW MANY DRINKS DO YOU HAVE: 0
ALCOHOL_USE: NO
DOES PATIENT WANT TO STOP DRINKING: NO
AVERAGE NUMBER OF DAYS PER WEEK YOU HAVE A DRINK CONTAINING ALCOHOL: 0
HAVE PEOPLE ANNOYED YOU BY CRITICIZING YOUR DRINKING: NO
EVER HAD A DRINK FIRST THING IN THE MORNING TO STEADY YOUR NERVES TO GET RID OF A HANGOVER: NO
HAVE YOU EVER FELT YOU SHOULD CUT DOWN ON YOUR DRINKING: NO
EVER_SMOKED: NEVER
HOW MANY TIMES IN THE PAST YEAR HAVE YOU HAD 5 OR MORE DRINKS IN A DAY: 0

## 2019-08-03 ASSESSMENT — ENCOUNTER SYMPTOMS
COUGH: 0
WEAKNESS: 1
FEVER: 0
DIARRHEA: 0
CHILLS: 0
ABDOMINAL PAIN: 0
HEADACHES: 1
HEADACHES: 0
PALPITATIONS: 0
BLOOD IN STOOL: 1
CONSTIPATION: 0
SHORTNESS OF BREATH: 0
SHORTNESS OF BREATH: 1
NAUSEA: 0
BLURRED VISION: 0
DOUBLE VISION: 0
FALLS: 0
VOMITING: 0
DIZZINESS: 1
HEMOPTYSIS: 0

## 2019-08-03 ASSESSMENT — COGNITIVE AND FUNCTIONAL STATUS - GENERAL
SUGGESTED CMS G CODE MODIFIER MOBILITY: CK
DRESSING REGULAR UPPER BODY CLOTHING: A LITTLE
MOBILITY SCORE: 19
MOVING FROM LYING ON BACK TO SITTING ON SIDE OF FLAT BED: A LITTLE
DAILY ACTIVITIY SCORE: 18
WALKING IN HOSPITAL ROOM: A LITTLE
PERSONAL GROOMING: A LITTLE
STANDING UP FROM CHAIR USING ARMS: A LITTLE
CLIMB 3 TO 5 STEPS WITH RAILING: A LITTLE
DRESSING REGULAR LOWER BODY CLOTHING: A LITTLE
EATING MEALS: A LITTLE
TOILETING: A LITTLE
SUGGESTED CMS G CODE MODIFIER DAILY ACTIVITY: CK
MOVING TO AND FROM BED TO CHAIR: A LITTLE
HELP NEEDED FOR BATHING: A LITTLE

## 2019-08-03 NOTE — ED NOTES
Medication Reconciliation updated and complete per pt & pt family at bedside  Allergies have been verified   No oral ABX within the last 14 days  Pt Home Pharmacy:[facility]

## 2019-08-03 NOTE — H&P
Internal Medicine Admitting History and Physical    Note Author: Justin Mayo M.D.       Name Jayshree Reinoso     1950   Age/Sex 68 y.o. female   MRN [id number]   Code Status DNR/DNI     After 5PM or if no immediate response to page, please call for cross-coverage  Attending/Team: Jay / Isma See Patient List for primary contact information  Call [phone] to page    1st Call - Day Intern (R1):   Dr. Mayo 2nd Call - Day Sr. Resident (R2/R3):   Dr. Ruiz       Chief Complaint:   Dizziness, Blood in stool    HPI:  History was gathered from patient and .     67 yo F with PMH of early Alzheimers, history of gastritis, duodenitis and recent history of hospitalization for GI bleed requiring 1 U PRBC transfusion ( 19) and recent ED visit for dizziness (19).    19 She was admitted for GI bleed with acute blood loss and required 1 U PRBC. She had orthostatic symptoms and upper GI bleed was suspected. EGD revealed inflamed and thicken folds and suspicious ulcer biopsied. Colonoscopy was done and showed diverticulosis, scatterd heme staining throughout colon but no overt GI bleeding. GI specialist, Dr. Castañeda, recommended to order stat tagged red cell scan vs CT angiogram if bleeding reoccur. She was discharged with on omeprazole BID and carafate.     Patient states since her recent hospital discharge 19, she had been experiencing dizziness anytime she is standing and walking.  Patient had ED visit on 19 with similar symptoms of dizziness that was improved with fluid resuscitations and was discharged home.      states that he noticed patient appears to breath harder when patient is up and about. He states that he had hoped the dizziness symptoms would improve but it persisted. Today when he saw blood in patient's stool, he decided patient needs to seek medical care. He described the stool as containing mixture of old blood and fresh red blood.      Additionally patient has bilateral swelling that she states has been going on for months. She has pain with palpation that is over the bilateral tibia region. Denies pain in bilateral calf region. She had recent visit to vein doctor and states an ultrasound was done but she was unable to clarify. She denies smoking history, hormone use. She has history of basal cell cancer lesion removed from nose, and another skin lesion removed from knee.     Patient denies abdominal pain, hemoptysis, hematemesis, chest pain, palpitation, shortness of breath, dysuria.       Review of Systems   Constitutional: Negative for chills and fever.   HENT: Negative for hearing loss.    Eyes: Negative for blurred vision and double vision.   Respiratory: Negative for cough, hemoptysis and shortness of breath.         Though  does state he noted patient breathes heavier with light activity around the house.    Cardiovascular: Positive for leg swelling. Negative for chest pain and palpitations.        Bilateral leg swelling with tenderness over the anterior tibia region, no pain in bilateral claves   Gastrointestinal: Positive for blood in stool. Negative for abdominal pain, constipation, diarrhea, melena, nausea and vomiting.   Genitourinary: Negative for dysuria, frequency and urgency.   Skin: Negative for rash.   Neurological: Positive for dizziness. Negative for headaches.             Past Medical History (Chronic medical problem, known complications and current treatment)    Early Alzheimers  Cancer 2010  Hypothyroid  Hx GI Bleed require RBC transfusion 7/2019        Past Surgical History:  Past Surgical History:   Procedure Laterality Date   • COLONOSCOPY - ENDO N/A 7/27/2019    Procedure: COLONOSCOPY;  Surgeon: Guru Castañeda M.D.;  Location: ENDOSCOPY [facility];  Service: Gastroenterology   • GASTROSCOPY-ENDO N/A 7/26/2019    Procedure: GASTROSCOPY;  Surgeon: Guru Castañeda M.D.;  Location: ENDOSCOPY  [facility];  Service: Gastroenterology   • GASTROSCOPY-ENDO  8/11/2016    Procedure: GASTROSCOPY-ENDO;  Surgeon: Ruchi Akers M.D.;  Location: SURGERY [facility];  Service:    • KNEE ARTHROPLASTY TOTAL Right 7/11/2016    Procedure: KNEE ARTHROPLASTY TOTAL;  Surgeon: Rayray Valentine M.D.;  Location: SURGERY [facility];  Service:    • LUMBAR FUSION POSTERIOR  2/27/2013    Performed by Sonny Flores M.D. at SURGERY [facility] ORS   • LUMBAR LAMINECTOMY DISKECTOMY  2/27/2013    Performed by Sonny Flores M.D. at SURGERY [facility] ORS   • VAGINECTOMY  7/25/2011    Performed by MICHELLE BURT at SURGERY [facility] ORS   • LAPAROSCOPY  5/7/2009    Performed by RODY SAENZ at SURGERY [facility] ORS   • FEMORAL HERNIA REPAIR  5/7/2009    Performed by RODY SAENZ at SURGERY [facility] ORS   • INGUINAL HERNIA REPAIR  12/18/2008    Performed by RODY SAENZ at SURGERY [facility] ORS   • RECTUS REPAIR  10/9/08    Performed by EFREN REYNOLDS at SURGERY [facility] ORS   • SCLERAL BUCKLING  7/9/08    Performed by JORGE DAVENPORT at SURGERY [facility] ORS   • GYN SURGERY     • GYN SURGERY      vaginal mesh removed   • GYN SURGERY      hysterectomy   • HERNIA REPAIR         Current Outpatient Medications:  Home Medications     Reviewed by Surjit Tapia (Pharmacy Tech) on 08/03/19 at 1202  Med List Status: Complete   Medication Last Dose Status   B Complex Vitamins (VITAMIN B COMPLEX PO) 8/2/2019 Active   CALCIUM-MAGNESIUM PO 8/2/2019 Active   Coenzyme Q10 (CO Q 10) 100 MG Cap 8/3/2019 Active   donepezil (ARICEPT) 10 MG tablet 8/2/2019 Active   levothyroxine (SYNTHROID) 100 MCG Tab 8/3/2019 Active   Melatonin 10 MG Tab 8/2/2019 Active   NON SPECIFIED 8/3/2019 Active   omeprazole (PRILOSEC) 20 MG delayed-release capsule 8/2/2019 Active   Potassium 99 MG Tab 8/2/2019 Active   sucralfate (CARAFATE) 1 GM Tab 8/3/2019 Active   vitamin k 100 MCG tablet  8/3/2019 Active   Vitamins A & D (VITAMIN A & D) 5000-400 UNITS Cap 8/2/2019 Active                Medication Allergy/Sensitivities:  No Known Allergies      Family History (mandatory)   No family history on file.    Social History (mandatory)   Social History     Socioeconomic History   • Marital status:      Spouse name: Not on file   • Number of children: Not on file   • Years of education: Not on file   • Highest education level: Not on file   Occupational History   • Not on file   Social Needs   • Financial resource strain: Not on file   • Food insecurity:     Worry: Not on file     Inability: Not on file   • Transportation needs:     Medical: Not on file     Non-medical: Not on file   Tobacco Use   • Smoking status: Never Smoker   • Smokeless tobacco: Never Used   Substance and Sexual Activity   • Alcohol use: No   • Drug use: No   • Sexual activity: Not Currently   Lifestyle   • Physical activity:     Days per week: Not on file     Minutes per session: Not on file   • Stress: Not on file   Relationships   • Social connections:     Talks on phone: Not on file     Gets together: Not on file     Attends Mandaeism service: Not on file     Active member of club or organization: Not on file     Attends meetings of clubs or organizations: Not on file     Relationship status: Not on file   • Intimate partner violence:     Fear of current or ex partner: Not on file     Emotionally abused: Not on file     Physically abused: Not on file     Forced sexual activity: Not on file   Other Topics Concern   • Not on file   Social History Narrative   • Not on file     Living situation: with   PCP : Wily Garcia M.D.    Physical Exam     Vitals:    08/03/19 1300 08/03/19 1307 08/03/19 1309 08/03/19 1329   BP: 108/65 100/68 100/68 116/64   Pulse: 67 66 (!) 58 62   Resp:  15  16   Temp:  36.7 °C (98 °F)  36.1 °C (97 °F)   TempSrc:    Temporal   SpO2: 94% 96% 95% 96%   Weight:       Height:         Body mass  index is 26.52 kg/m².  O2 therapy: Pulse Oximetry: 96 %    Physical Exam   Constitutional: She is well-developed, well-nourished, and in no distress. No distress.   HENT:   Head: Normocephalic and atraumatic.   Mouth/Throat: Oropharynx is clear and moist.   Conjunctiva pale   Eyes: Pupils are equal, round, and reactive to light.   Neck: No tracheal deviation present. No thyromegaly present.   Cardiovascular: Normal rate, regular rhythm, normal heart sounds and intact distal pulses. Exam reveals no gallop and no friction rub.   No murmur heard.  Pulmonary/Chest: Effort normal and breath sounds normal. No respiratory distress. She has no wheezes. She has no rales. She exhibits no tenderness.   Abdominal: Soft. She exhibits no distension. There is no tenderness. There is no rebound and no guarding.   Musculoskeletal: Normal range of motion. She exhibits edema and tenderness.   Bilateral lower extremity edema, with tenderness to palpation over the anterior tibial region bilaterally, no tenderness over bilateral calves   Lymphadenopathy:     She has no cervical adenopathy.   Skin: Skin is warm and dry. No rash noted. She is not diaphoretic. No erythema. No pallor.         Data Review       Current Records review/summary: Completed    Lab Data Review:  Recent Results (from the past 24 hour(s))   COD (ADULT)    Collection Time: 08/03/19 11:10 AM   Result Value Ref Range    ABO Grouping Only O     Rh Grouping Only POS     Antibody Screen-Cod NEG     Component R       R3                  Red Blood Cells3    [id number]   issued       08/03/19   12:58      Product Type Red Blood Cells LR Pheresis     Dispense Status issued     Unit Number (Barcoded) [id number]     Product Code (Barcoded) M9569C46     Blood Type (Barcoded) 5100    CBC WITH DIFFERENTIAL    Collection Time: 08/03/19 11:10 AM   Result Value Ref Range    WBC 8.2 4.8 - 10.8 K/uL    RBC 2.08 (L) 4.20 - 5.40 M/uL    Hemoglobin 6.5 (L) 12.0 - 16.0 g/dL     Hematocrit 21.0 (L) 37.0 - 47.0 %    .0 (H) 81.4 - 97.8 fL    MCH 31.3 27.0 - 33.0 pg    MCHC 31.0 (L) 33.6 - 35.0 g/dL    RDW 56.3 (H) 35.9 - 50.0 fL    Platelet Count 410 164 - 446 K/uL    MPV 9.8 9.0 - 12.9 fL    Neutrophils-Polys 62.00 44.00 - 72.00 %    Lymphocytes 28.60 22.00 - 41.00 %    Monocytes 7.80 0.00 - 13.40 %    Eosinophils 1.00 0.00 - 6.90 %    Basophils 0.40 0.00 - 1.80 %    Immature Granulocytes 0.20 0.00 - 0.90 %    Nucleated RBC 0.00 /100 WBC    Neutrophils (Absolute) 5.09 2.00 - 7.15 K/uL    Lymphs (Absolute) 2.35 1.00 - 4.80 K/uL    Monos (Absolute) 0.64 0.00 - 0.85 K/uL    Eos (Absolute) 0.08 0.00 - 0.51 K/uL    Baso (Absolute) 0.03 0.00 - 0.12 K/uL    Immature Granulocytes (abs) 0.02 0.00 - 0.11 K/uL    NRBC (Absolute) 0.00 K/uL   COMP METABOLIC PANEL    Collection Time: 08/03/19 11:10 AM   Result Value Ref Range    Sodium 141 135 - 145 mmol/L    Potassium 4.0 3.6 - 5.5 mmol/L    Chloride 111 96 - 112 mmol/L    Co2 21 20 - 33 mmol/L    Anion Gap 9.0 0.0 - 11.9    Glucose 86 65 - 99 mg/dL    Bun 19 8 - 22 mg/dL    Creatinine 0.58 0.50 - 1.40 mg/dL    Calcium 8.1 (L) 8.5 - 10.5 mg/dL    AST(SGOT) 17 12 - 45 U/L    ALT(SGPT) 14 2 - 50 U/L    Alkaline Phosphatase 54 30 - 99 U/L    Total Bilirubin 0.3 0.1 - 1.5 mg/dL    Albumin 3.4 3.2 - 4.9 g/dL    Total Protein 5.8 (L) 6.0 - 8.2 g/dL    Globulin 2.4 1.9 - 3.5 g/dL    A-G Ratio 1.4 g/dL   LIPASE    Collection Time: 08/03/19 11:10 AM   Result Value Ref Range    Lipase 23 11 - 82 U/L   PROTHROMBIN TIME    Collection Time: 08/03/19 11:10 AM   Result Value Ref Range    PT 13.2 12.0 - 14.6 sec    INR 0.99 0.87 - 1.13   APTT    Collection Time: 08/03/19 11:10 AM   Result Value Ref Range    APTT 24.0 (L) 24.7 - 36.0 sec   ESTIMATED GFR    Collection Time: 08/03/19 11:10 AM   Result Value Ref Range    GFR If African American >60 >60 mL/min/1.73 m 2    GFR If Non African American >60 >60 mL/min/1.73 m 2       Imaging/Procedures Review:     Independant Imaging Review: Completed  DX-CHEST-PORTABLE (1 VIEW)   Final Result         Patchy left basilar opacities, atelectasis consolidation.      CTA ABDOMEN PELVIS W & W/O POST PROCESS    (Results Pending)                     Assessment/Plan     * Anemia due to GI blood loss- (present on admission)  Assessment & Plan  - Acute blood loss anemia likely secondary GI blood loss  - Orthostatic hypotension due to GI bleed and dehydration  - Hx of recent hospitalization due to GI blood loss requiring 1 U PRBC, 7/25/19-7/29/19; Colonoscopy showed diverticulosis and heme stained wall, EGD showed evidence of duodenitis and gastritis; GI recommend tagged RBC scan vs CTA at that time;   - Hgb 6.5 on admission, Hct 21; s/p 1 U PRBC, last Hgb on 7/30/19 8.0  - Discussed with GI, and proceeded with CTA abdomen pelvis due to acute nature of anemia with plans to proceed with IR embolization;   - CTA abdomen pelvis showed no active bleeding  - No history of smoking, drinking or other drugs  - INR 0.99      Plan   Patient has high risk of rebleeding; will monitor Hemoglobin and Hematocrit q4H  Two large bore IV, for maintenance fluid and possible transfusions  IV pantoprazole 40mg BID reduce risk of rebleed      Edema, lower extremity  Assessment & Plan  - Bilateral lower extremity edema with pain and tenderness over tibial region  - Likely due to increase hydrostatic pressure secondary to chronic venous stasis vs cardiac etiology  - There is concern for DVT as patient had recent trip to [place]  - Hx of basal cell carcinoma, does not smoke, not on hormone therapy  - BUN/Cr 19/0.5    Plan  Echocardiogram and U/S bilateral lower extremity  No anticoagulant due to high risk of bleeding    Dizziness  Assessment & Plan  Likely due to blood loss anemia and dehydration as above      Dementia- (present on admission)  Assessment & Plan  Dementia/ Early alzheimers  Patient alert and oriented x 3    Continue home medication  Aricept    Hypothyroid  Assessment & Plan  Continue levothyroxine 100mcg      Anticipated Hospital stay:  >2 midnights        Quality Measures  Quality-Core Measures   Reviewed items::  Labs reviewed and Medications reviewed  Lloyd catheter::  No Lloyd  DVT prophylaxis pharmacological::  Contraindicated - Anemia requiring blood transfusion  DVT prophylaxis - mechanical:  SCDs  Ulcer Prophylaxis::  Yes    PCP: Wily Garcia M.D.

## 2019-08-03 NOTE — ED NOTES
Pt has had 2 US PIV infiltrate, pt threatened to leave AMA, [facility] MDs notified and present at bedside. Meds ordered and administered. Third US PIV attempt in process.

## 2019-08-03 NOTE — ED PROVIDER NOTES
"ED Provider Note      CHIEF COMPLAINT  Chief Complaint   Patient presents with   • Dizziness   • Blood Pressure Problem   • Weakness   • Bloody Stools       HPI  Jayshree Reinoso is a 68 y.o. female who presents to the Emergency Department for persistent \"dizziness.\" Ms. Reinoso has dementia but her  is at bedside and helps provide collateral information. She was recently hospitalized (7/25/19-7/29/19) for upper GI bleed c/b anemia requiring 1 pRBC transfusion. During that hospitalization she had upper endoscopy showing gastritis and duodenitis without active bleeding and colonoscopy significant for diverticulosis throughout colon. She was discharged with carafate and omeprazole which she has been taking as prescribed. Since discharge on 7/29, she reports persistent dizziness described as \"feeling that I'm going to fall\" when she is walking; she was evaluated for similar symptoms on 7/30 with improvement following fluid resuscitation and was discharged home. It is slightly worse with head movements but she denies worsening with changing position from sitting to standing. She had a small amount of bright red blood per rectum following bowel movement this morning. No known melena or hematochezia since hospital d/c prior to this morning.       REVIEW OF SYSTEMS  See HPI for further details. All other systems are negative.     PAST MEDICAL HISTORY   has a past medical history of Alzheimer's disease, Alzheimer's disease, Anesthesia, Arthritis, Backpain, Cancer ([facility]) (2010), Hypothyroid, Other specified symptom associated with female genital organs, Pain, Renal disorder, Unspecified disorder of thyroid, and Unspecified urinary incontinence.    SOCIAL HISTORY  Social History     Tobacco Use   • Smoking status: Never Smoker   • Smokeless tobacco: Never Used   Substance and Sexual Activity   • Alcohol use: No   • Drug use: No   • Sexual activity: Not Currently     Social History     Substance and Sexual Activity   Drug " "Use No       SURGICAL HISTORY   has a past surgical history that includes scleral buckling (7/9/08); rectus repair (10/9/08); hernia repair; inguinal hernia repair (12/18/2008); laparoscopy (5/7/2009); femoral hernia repair (5/7/2009); lumbar fusion posterior (2/27/2013); lumbar laminectomy diskectomy (2/27/2013); gyn surgery; vaginectomy (7/25/2011); gyn surgery; gyn surgery; knee arthroplasty total (Right, 7/11/2016); gastroscopy-endo (8/11/2016); gastroscopy-endo (N/A, 7/26/2019); and colonoscopy - endo (N/A, 7/27/2019).    CURRENT MEDICATIONS  Home Medications     Reviewed by Surjit Tapia (Pharmacy Tech) on 08/03/19 at 1202  Med List Status: Complete   Medication Last Dose Status   B Complex Vitamins (VITAMIN B COMPLEX PO) 8/2/2019 Active   CALCIUM-MAGNESIUM PO 8/2/2019 Active   Coenzyme Q10 (CO Q 10) 100 MG Cap 8/3/2019 Active   donepezil (ARICEPT) 10 MG tablet 8/2/2019 Active   levothyroxine (SYNTHROID) 100 MCG Tab 8/3/2019 Active   Melatonin 10 MG Tab 8/2/2019 Active   NON SPECIFIED 8/3/2019 Active   omeprazole (PRILOSEC) 20 MG delayed-release capsule 8/2/2019 Active   Potassium 99 MG Tab 8/2/2019 Active   sucralfate (CARAFATE) 1 GM Tab 8/3/2019 Active   vitamin k 100 MCG tablet 8/3/2019 Active   Vitamins A & D (VITAMIN A & D) 5000-400 UNITS Cap 8/2/2019 Active                ALLERGIES  No Known Allergies    PHYSICAL EXAM  VITAL SIGNS: /64   Pulse 62   Temp 36.1 °C (97 °F) (Temporal)   Resp 16   Ht 1.575 m (5' 2\")   Wt 65.8 kg (145 lb)   SpO2 96%   BMI 26.52 kg/m²   Pulse ox interpretation: I interpret this pulse ox as normal.  Constitutional: Alert in no apparent distress.  HENT: No signs of trauma, Bilateral external ears normal, Nose normal, TMs pearly grey and canals clear.   Eyes: Conjunctiva normal, Non-icteric.   Neck: Normal range of motion, Supple, No stridor.   Lymphatic: No lymphadenopathy noted.   Cardiovascular: Regular rate and rhythm, no murmurs.   Thorax & Lungs: Normal " breath sounds, No respiratory distress, No wheezing, No chest tenderness.   Abdomen: Bowel sounds normal, Soft, No tenderness, No masses, No pulsatile masses. No peritoneal signs.  Skin: Multiple ecchymoses over forearms (multiple IV/blood draw attempts). Warm, Dry, No erythema, No rash. .   Extremities: +1 edema bilateral and symmetric, Intact distal pulses, No cyanosis.  Musculoskeletal: Good range of motion in all major joints. No or major deformities noted.   Neurologic: Alert , can answer most simple questions but memory deficits impair ability to provide history. Normal motor function, Normal sensory function, No focal deficits noted.   Psychiatric: Affect normal, Judgment normal, Mood normal.     Orthostatic vital signs were completed and were positive. SBP change from 100s to 80s, but has remained stable since she has been in reclined position.    DIAGNOSTIC STUDIES / PROCEDURES    EKG  N/A      LABS  Labs Reviewed   CBC WITH DIFFERENTIAL - Abnormal; Notable for the following components:       Result Value    RBC 2.08 (*)     Hemoglobin 6.5 (*)     Hematocrit 21.0 (*)     .0 (*)     MCHC 31.0 (*)     RDW 56.3 (*)     All other components within normal limits    Narrative:     Indicate which anticoagulants the patient is on:->NONE   COMP METABOLIC PANEL - Abnormal; Notable for the following components:    Calcium 8.1 (*)     Total Protein 5.8 (*)     All other components within normal limits    Narrative:     Indicate which anticoagulants the patient is on:->NONE   APTT - Abnormal; Notable for the following components:    APTT 24.0 (*)     All other components within normal limits    Narrative:     Indicate which anticoagulants the patient is on:->NONE   COD (ADULT)   LIPASE    Narrative:     Indicate which anticoagulants the patient is on:->NONE   PROTHROMBIN TIME    Narrative:     Indicate which anticoagulants the patient is on:->NONE   ESTIMATED GFR    Narrative:     Indicate which anticoagulants  the patient is on:->NONE   FIBRINOGEN   PLATELET COUNT   CBC WITHOUT DIFFERENTIAL   POC STOOL OCCULT BLD   RELEASE RED BLOOD CELLS   TRANSFUSE RED BLOOD CELLS-NURSING COMMUNICATION (UNITS)     All labs reviewed by me.    RADIOLOGY  DX-CHEST-PORTABLE (1 VIEW)   Final Result         Patchy left basilar opacities, atelectasis consolidation.      CTA ABDOMEN PELVIS W & W/O POST PROCESS    (Results Pending)     The radiologist's interpretation of all radiological studies have been reviewed by me.    COURSE & MEDICAL DECISION MAKING  Nursing notes, VS, PMSFHx reviewed in chart.     12:05 PM Patient seen and examined at bedside. Differential diagnosis includes but is not limited to anemia 2/2 GI blood loss, diverticulitis, gastritis, hypotension, BPPV. Ordered for CBC to evaluate.     Initial lab results show hemoglobin is 6.5, down from 8.0 on 7/30 and 1 unit of pRBCs have been ordered. The case was discussed with [facility] IM who agrees to admit the patient. Prior to transfer to floor, CTA abdomen/pelvis ordered by the admitting team per GI request.       FINAL IMPRESSION  Anemia likely 2/2 GI loss  Orthostatic hypotension 2/2 above  Presyncope 2/2 above    I independently evaluated the patient and repeated the important components of the history and physical. I discussed the management with the resident. I have reviewed and agree with the pertinent clinical information above including history, exam, study findings, and recommendations.  This is a stable 68-year-old female with a recent history of endoscopy for GI bleeding.  She has symptoms of dizziness likely consistent with her demonstrated significant anemia, requiring blood transfusion.  There is no evidence of focal deficit to suggest that her dizziness is secondary to a cerebrovascular accident.  This she has some symptoms of orthostatic hypotension and presyncope, she is stable at rest, generally well perfused, and at her cognitive baseline.  She will certainly need to  be admitted for blood transfusion, and attempt at localization of her source of bleeding via the CT a that has been ordered by the admitting team, per GI request, and likely by further GI consultation.    Electronically signed by: Cooper Rivera, 8/4/2019 8:32 AM

## 2019-08-03 NOTE — ED TRIAGE NOTES
Pt bib  in WC.  Chief Complaint   Patient presents with   • Dizziness   • Blood Pressure Problem   • Weakness   • Bloody Stools     Recent admission for GI bleed requiring a blood transfusion. Pt states she does not feel better. Bright red blood noted in stool this am. Pt reports her BP has been running low at home.

## 2019-08-03 NOTE — ASSESSMENT & PLAN NOTE
- Acute blood loss anemia likely secondary GI blood loss.  - Hgb 7.1 (8/4), transfused 1U pRBC and Hgb 9.1 after transfusion  - Today, Hgb 10.1   - CTA abdomen showed no acute bleed, and dense diverticulosis    - Patient okay to discharge given hemoglobin is stable and has no further sign of bleeding  - Will need to follow with PCP and GI  - No further workup at this time given recent EGD and Colonoscopy  - Educated patient to keep hydrated

## 2019-08-03 NOTE — SENIOR ADMIT NOTE
Senior Admit Note    Patient: Jayshree Reinoso.  MRN: [id number].                               Chief complaint: dizziness    Problem list:   # dizziness secondary to dehydration and likely gastrointestinal bleed  # anemia (admission Hgb 6.5) likely secondary to GI bleed  # gastrointestinal bleed  # orthostatic hypotension secondary to GI bleed and dehydration   # recent admission for GI bleed with EGD showing gastritis/duodenitis and colonoscopy showing diverticulosis, which was presumed to be source of bleeding; compliant with home omeprazole 20 mg daily  # dementia   # hypothyroidism  # bilateral lower extremity edema    Plan:   - Given 1 unit pRBCs in ED.   - Place two large bore peripheral IVs.    - Admit to Telemetry as outpatient.   - Trend H&H q4 hours.    - Discussed with GI Consultants, Dr. Mckeon, who recommended, as Dr. Castañeda recently recommended, stat CTA abdomen/pelvis to evaluate for bleed with forward to IR if coiling needed.    - NPO for now subject to possible procedure.   - IVF 83 cc/h.    - IV pantoprazole 40 mg twice daily.  Consider need for IV pantoprazole drip if Hgb acutely worsens.   - May need official GI consult pending imaging results.   - Echocardiogram and bilateral venous doppler ultrasound to evaluate lower extremity edema.    - Continue home levothyroxine.   - Continue home donepezil.        DVT prophylaxis: SCDs.  Chemical DVT ppx contraindicated in this patient with recent GI bleed now with Hgb 6.5.   Code status: DNR/DNI.     For complete details, please refer to H&P by intern.     Sherry Ruiz M.D.

## 2019-08-03 NOTE — ED NOTES
This RN stayed with pt for first 15 minutes of blood transfusion, no SS of reaction, VSS, NAD. Pt educated to notify staff of any flank pain, fever, or chills, pt verbalized understanding.

## 2019-08-03 NOTE — NON-PROVIDER
Internal Medicine Medical Student Admitting History and Physical  Note Author: German Hernandez, Student    Name Jayshree Reinoso     1950   Age/Sex 68 y.o. female   MRN [id number]   Code Status DNR/DNI       Chief Complaint:  Weakness     HPI:    Mrs. Reinoso is a 68-year-old female with a history of dementia who was recently admitted (19) for rectal bleed with associated anemia, and was discharged after recovering well with 1 pRBC infusion. She was then admitted again with a rectal bleed on 19 and had a colonoscopy on  which showed pandiverticulosis but no overt bleeding. She was then discharged on 19 on omeprazole and carafate, but returned to the ED the next day with complaints of weakness and dizziness. At that time her H&H were improving, with a hemoglobin of 8.0 on  up from 7.7 on . Her dizziness improved after a liter of IV fluids at that time, and she opted to go home.  Since that time, the dizziness returned. She feels lightheaded when she gets up or when she walks. Because of her dementia, the patient can be a difficult historian, however her  is present to help provide history. He states that the patient had been denying further rectal bleeding, but that he noticed some blood in the toilet this morning which is what prompted him to bring her back to the hospital. She is not on blood thinners. The patient denies abdominal pain, nausea, or vomiting. She states her stools have been formed and also denies constipation. No fevers. No urinary symptoms.   She has also had bilateral leg swelling that has developed gradually over the past month or so. Her  states they did recently travel to [place] prior to her hospitalizations in July, and that the flight was about 6 hours or so. She denies chest pain. She does sometimes feel short of breath while walking, but this has only been since the weakness/dizziness started.     ED course:  In the ED, labs revealed significant  "anemia with H&H of 6.5/21. .   INR normal.   Type & screen was done and RBC infusion was started.       Review of Systems   Constitutional: Negative for fever.   HENT: Negative for nosebleeds.    Eyes: Negative for blurred vision.   Respiratory: Positive for shortness of breath (Short of breath with exertion). Negative for cough and hemoptysis.    Cardiovascular: Positive for leg swelling. Negative for chest pain. Orthopnea: Bilateral leg swelling, developed over the past month or so.    Gastrointestinal: Positive for blood in stool. Negative for abdominal pain, constipation, diarrhea, nausea and vomiting.   Genitourinary: Negative for dysuria and hematuria.   Musculoskeletal: Negative for falls.   Skin: Negative for rash.   Neurological: Positive for dizziness, weakness (generalized) and headaches.             Past Medical History (chronic problems, known complications, current management)     Hypothyroidism  Dementia -  reports patient was seen by neurologist who suspected aphasia versus early onset alzheimer's.   Chronic back pain  Osteoarthritis  Basal cell carcinoma - resolved status-post excision    Past Surgical History:  Lumbar laminectomy, diskectomy, & fusion surgery (2013)  Gynecologic surgery    Medication Allergy/Sensitivities:  NKDA    Family History:  Mother - CVA    Social History:  Smoking: Never smoked  Alcohol: None  Illictis: None  Living situation: Patient lives with her  in [place], [place].      Physical Exam     Vitals:    08/03/19 1300 08/03/19 1307 08/03/19 1309 08/03/19 1329   BP: 108/65 100/68 100/68 116/64   Pulse: 67 66 (!) 58 62   Resp:  15  16   Temp:  36.7 °C (98 °F)  36.1 °C (97 °F)   TempSrc:    Temporal   SpO2: 94% 96% 95% 96%   Weight:       Height:         Body mass index is 26.52 kg/m².  /64   Pulse 62   Temp 36.1 °C (97 °F) (Temporal)   Resp 16   Ht 1.575 m (5' 2\")   Wt 65.8 kg (145 lb)   SpO2 96%   BMI 26.52 kg/m²   O2 therapy: Pulse Oximetry: " 96 %    Physical Exam   Constitutional:   Pale-appearing, alert female. Answers simple questions appropriately. Mental status at baseline per .    HENT:   Head: Normocephalic and atraumatic.   Eyes:   Pale conjunctiva   Neck: Neck supple.   Cardiovascular: Normal rate, regular rhythm and intact distal pulses.   Pulses:       Radial pulses are 2+ on the right side, and 2+ on the left side.        Dorsalis pedis pulses are 2+ on the right side, and 2+ on the left side.   Pulmonary/Chest: No respiratory distress.   Lungs clear to auscultation   Abdominal: Soft. There is no tenderness.   Musculoskeletal: Edema: Bilateral lower extremity edema.  Tenderness: Tender over anterior leg. No calf tenderness.    Neurological: She is alert.   Moving all extremities. No focal neurologic deficits.    Skin: Skin is warm and dry. There is pallor.   Psychiatric:   Blunted affect. Answers simple questions appropriately. Slow speech.  states patient's mental status is at baseline.      Most Recent Labs:    Lab Results   Component Value Date/Time    WBC 8.2 08/03/2019 11:10 AM    RBC 2.08 (L) 08/03/2019 11:10 AM    HEMOGLOBIN 6.5 (L) 08/03/2019 11:10 AM    HEMATOCRIT 21.0 (L) 08/03/2019 11:10 AM    .0 (H) 08/03/2019 11:10 AM    MCH 31.3 08/03/2019 11:10 AM    MCHC 31.0 (L) 08/03/2019 11:10 AM    MPV 9.8 08/03/2019 11:10 AM    NEUTSPOLYS 62.00 08/03/2019 11:10 AM    LYMPHOCYTES 28.60 08/03/2019 11:10 AM    MONOCYTES 7.80 08/03/2019 11:10 AM    EOSINOPHILS 1.00 08/03/2019 11:10 AM    BASOPHILS 0.40 08/03/2019 11:10 AM      Lab Results   Component Value Date/Time    SODIUM 141 08/03/2019 11:10 AM    POTASSIUM 4.0 08/03/2019 11:10 AM    CHLORIDE 111 08/03/2019 11:10 AM    CO2 21 08/03/2019 11:10 AM    GLUCOSE 86 08/03/2019 11:10 AM    BUN 19 08/03/2019 11:10 AM    CREATININE 0.58 08/03/2019 11:10 AM      Lab Results   Component Value Date/Time    ALTSGPT 14 08/03/2019 11:10 AM    ASTSGOT 17 08/03/2019 11:10 AM     ALKPHOSPHAT 54 08/03/2019 11:10 AM    TBILIRUBIN 0.3 08/03/2019 11:10 AM    LIPASE 23 08/03/2019 11:10 AM    ALBUMIN 3.4 08/03/2019 11:10 AM    GLOBULIN 2.4 08/03/2019 11:10 AM    INR 0.99 08/03/2019 11:10 AM     Lab Results   Component Value Date/Time    PROTHROMBTM 13.2 08/03/2019 11:10 AM    INR 0.99 08/03/2019 11:10 AM      CTA ABDOMEN PELVIS W & W/O POST PROCESS   Final Result      1.  No active GI hemorrhage identified.      2.  Dense diverticulosis of the sigmoid colon. No CT evidence of acute diverticulitis. No area of diverticular hemorrhage discernible.      3.  Cholecystectomy and appendectomy changes.      DX-CHEST-PORTABLE (1 VIEW)   Final Result         Patchy left basilar opacities, atelectasis consolidation.            Assessment/Plan     Anemia, likely from gastrointestinal blood loss  Patient presented with lightheadedness and weakness for the past several days. Two recent admissions for anemia and GI bleeds. Colonoscopy last week showed pandiverticulosis without obvious source of bleed.  reports blood in patient's stool this morning.   - H&H 6.5/21 in ED  - RBC infusion started  - No obvious hemorrhage on CTA abd/pelvis   - Hypotensive at 96/57 earlier, but has been stable since around 1200.   - Will continue to monitor.     Bilateral lower extremity edema  Patient presents with bilateral lower extremity edema that her  states developed over the past few weeks. She has recently had some prolonged travel, on a 6-hour flight from [place] last month. She denies chest pain, but has had some dyspnea on exertion in the past few weeks which they presumed to be due to the anemia. She is not a smoker.   - BLE US ordered to evaluate for DVT.   - Consider diuresing if patient requires more than one unit of blood.   - Will continue to monitor.     Dementia  Patient has known history of dementia.  states her mental status is at baseline. She has reportedly seen a neurologist who told her  that it is likely early onset Alzheimer's or aphasia. Takes Aricept 10 mg QD.   - Continue home medications    Hypothyroidism  Patient has known history of hypothyroidism. Takes levothyroxine at home and reports good adherence to prescription schedule.  - TSH WNL on 7/25/19.  - No symptoms to indicate acute change in thyroid function.   - Continue home medications.     DVT prophylaxis: SCDs

## 2019-08-04 ENCOUNTER — APPOINTMENT (OUTPATIENT)
Dept: RADIOLOGY | Facility: MEDICAL CENTER | Age: 69
DRG: 378 | End: 2019-08-04
Attending: STUDENT IN AN ORGANIZED HEALTH CARE EDUCATION/TRAINING PROGRAM
Payer: COMMERCIAL

## 2019-08-04 ENCOUNTER — APPOINTMENT (OUTPATIENT)
Dept: CARDIOLOGY | Facility: MEDICAL CENTER | Age: 69
DRG: 378 | End: 2019-08-04
Attending: STUDENT IN AN ORGANIZED HEALTH CARE EDUCATION/TRAINING PROGRAM
Payer: COMMERCIAL

## 2019-08-04 LAB
ALBUMIN SERPL BCP-MCNC: 3.1 G/DL (ref 3.2–4.9)
ALBUMIN/GLOB SERPL: 1.4 G/DL
ALP SERPL-CCNC: 51 U/L (ref 30–99)
ALT SERPL-CCNC: 12 U/L (ref 2–50)
ANION GAP SERPL CALC-SCNC: 9 MMOL/L (ref 0–11.9)
AST SERPL-CCNC: 20 U/L (ref 12–45)
BASOPHILS # BLD AUTO: 0.4 % (ref 0–1.8)
BASOPHILS # BLD: 0.03 K/UL (ref 0–0.12)
BILIRUB SERPL-MCNC: 0.4 MG/DL (ref 0.1–1.5)
BUN SERPL-MCNC: 12 MG/DL (ref 8–22)
CALCIUM SERPL-MCNC: 7.8 MG/DL (ref 8.5–10.5)
CHLORIDE SERPL-SCNC: 113 MMOL/L (ref 96–112)
CO2 SERPL-SCNC: 18 MMOL/L (ref 20–33)
CREAT SERPL-MCNC: 0.62 MG/DL (ref 0.5–1.4)
EOSINOPHIL # BLD AUTO: 0.13 K/UL (ref 0–0.51)
EOSINOPHIL NFR BLD: 1.7 % (ref 0–6.9)
ERYTHROCYTE [DISTWIDTH] IN BLOOD BY AUTOMATED COUNT: 59.1 FL (ref 35.9–50)
ERYTHROCYTE [DISTWIDTH] IN BLOOD BY AUTOMATED COUNT: 60.2 FL (ref 35.9–50)
GLOBULIN SER CALC-MCNC: 2.2 G/DL (ref 1.9–3.5)
GLUCOSE SERPL-MCNC: 93 MG/DL (ref 65–99)
HCT VFR BLD AUTO: 23.7 % (ref 37–47)
HCT VFR BLD AUTO: 23.8 % (ref 37–47)
HCT VFR BLD AUTO: 32.1 % (ref 37–47)
HGB BLD-MCNC: 7.1 G/DL (ref 12–16)
HGB BLD-MCNC: 7.4 G/DL (ref 12–16)
HGB BLD-MCNC: 9.8 G/DL (ref 12–16)
IMM GRANULOCYTES # BLD AUTO: 0.03 K/UL (ref 0–0.11)
IMM GRANULOCYTES NFR BLD AUTO: 0.4 % (ref 0–0.9)
LYMPHOCYTES # BLD AUTO: 2.24 K/UL (ref 1–4.8)
LYMPHOCYTES NFR BLD: 29.2 % (ref 22–41)
MCH RBC QN AUTO: 29.9 PG (ref 27–33)
MCH RBC QN AUTO: 30.3 PG (ref 27–33)
MCHC RBC AUTO-ENTMCNC: 30.5 G/DL (ref 33.6–35)
MCHC RBC AUTO-ENTMCNC: 31.2 G/DL (ref 33.6–35)
MCV RBC AUTO: 97.1 FL (ref 81.4–97.8)
MCV RBC AUTO: 97.9 FL (ref 81.4–97.8)
MONOCYTES # BLD AUTO: 0.8 K/UL (ref 0–0.85)
MONOCYTES NFR BLD AUTO: 10.4 % (ref 0–13.4)
MORPHOLOGY BLD-IMP: NORMAL
NEUTROPHILS # BLD AUTO: 4.45 K/UL (ref 2–7.15)
NEUTROPHILS NFR BLD: 57.9 % (ref 44–72)
NRBC # BLD AUTO: 0 K/UL
NRBC BLD-RTO: 0 /100 WBC
PLATELET # BLD AUTO: 360 K/UL (ref 164–446)
PLATELET # BLD AUTO: 433 K/UL (ref 164–446)
PMV BLD AUTO: 9.6 FL (ref 9–12.9)
PMV BLD AUTO: 9.6 FL (ref 9–12.9)
POTASSIUM SERPL-SCNC: 3.6 MMOL/L (ref 3.6–5.5)
PROT SERPL-MCNC: 5.3 G/DL (ref 6–8.2)
RBC # BLD AUTO: 2.44 M/UL (ref 4.2–5.4)
RBC # BLD AUTO: 3.28 M/UL (ref 4.2–5.4)
SODIUM SERPL-SCNC: 140 MMOL/L (ref 135–145)
WBC # BLD AUTO: 7.7 K/UL (ref 4.8–10.8)
WBC # BLD AUTO: 9.1 K/UL (ref 4.8–10.8)

## 2019-08-04 PROCEDURE — 36430 TRANSFUSION BLD/BLD COMPNT: CPT

## 2019-08-04 PROCEDURE — 96374 THER/PROPH/DIAG INJ IV PUSH: CPT

## 2019-08-04 PROCEDURE — 700105 HCHG RX REV CODE 258

## 2019-08-04 PROCEDURE — 85027 COMPLETE CBC AUTOMATED: CPT

## 2019-08-04 PROCEDURE — 770020 HCHG ROOM/CARE - TELE (206)

## 2019-08-04 PROCEDURE — C9113 INJ PANTOPRAZOLE SODIUM, VIA: HCPCS | Performed by: STUDENT IN AN ORGANIZED HEALTH CARE EDUCATION/TRAINING PROGRAM

## 2019-08-04 PROCEDURE — 36415 COLL VENOUS BLD VENIPUNCTURE: CPT

## 2019-08-04 PROCEDURE — P9016 RBC LEUKOCYTES REDUCED: HCPCS

## 2019-08-04 PROCEDURE — 700111 HCHG RX REV CODE 636 W/ 250 OVERRIDE (IP): Performed by: STUDENT IN AN ORGANIZED HEALTH CARE EDUCATION/TRAINING PROGRAM

## 2019-08-04 PROCEDURE — 86923 COMPATIBILITY TEST ELECTRIC: CPT

## 2019-08-04 PROCEDURE — 93306 TTE W/DOPPLER COMPLETE: CPT

## 2019-08-04 PROCEDURE — 85018 HEMOGLOBIN: CPT

## 2019-08-04 PROCEDURE — 96376 TX/PRO/DX INJ SAME DRUG ADON: CPT

## 2019-08-04 PROCEDURE — A9270 NON-COVERED ITEM OR SERVICE: HCPCS | Performed by: STUDENT IN AN ORGANIZED HEALTH CARE EDUCATION/TRAINING PROGRAM

## 2019-08-04 PROCEDURE — 700105 HCHG RX REV CODE 258: Performed by: STUDENT IN AN ORGANIZED HEALTH CARE EDUCATION/TRAINING PROGRAM

## 2019-08-04 PROCEDURE — 85025 COMPLETE CBC W/AUTO DIFF WBC: CPT

## 2019-08-04 PROCEDURE — 99232 SBSQ HOSP IP/OBS MODERATE 35: CPT | Mod: GC | Performed by: INTERNAL MEDICINE

## 2019-08-04 PROCEDURE — 80053 COMPREHEN METABOLIC PANEL: CPT

## 2019-08-04 PROCEDURE — 93970 EXTREMITY STUDY: CPT

## 2019-08-04 PROCEDURE — 700102 HCHG RX REV CODE 250 W/ 637 OVERRIDE(OP): Performed by: STUDENT IN AN ORGANIZED HEALTH CARE EDUCATION/TRAINING PROGRAM

## 2019-08-04 PROCEDURE — 85014 HEMATOCRIT: CPT

## 2019-08-04 RX ORDER — SODIUM CHLORIDE 9 MG/ML
INJECTION, SOLUTION INTRAVENOUS
Status: COMPLETED
Start: 2019-08-04 | End: 2019-08-05

## 2019-08-04 RX ORDER — FUROSEMIDE 10 MG/ML
20 INJECTION INTRAMUSCULAR; INTRAVENOUS ONCE
Status: COMPLETED | OUTPATIENT
Start: 2019-08-04 | End: 2019-08-04

## 2019-08-04 RX ORDER — POTASSIUM CHLORIDE 20 MEQ/1
40 TABLET, EXTENDED RELEASE ORAL ONCE
Status: COMPLETED | OUTPATIENT
Start: 2019-08-04 | End: 2019-08-04

## 2019-08-04 RX ADMIN — DONEPEZIL HYDROCHLORIDE 10 MG: 5 TABLET, FILM COATED ORAL at 18:02

## 2019-08-04 RX ADMIN — SODIUM CHLORIDE: 9 INJECTION, SOLUTION INTRAVENOUS at 05:15

## 2019-08-04 RX ADMIN — LEVOTHYROXINE SODIUM 100 MCG: 100 TABLET ORAL at 05:15

## 2019-08-04 RX ADMIN — SODIUM CHLORIDE: 9 INJECTION, SOLUTION INTRAVENOUS at 10:45

## 2019-08-04 RX ADMIN — SUCRALFATE 1 G: 1 TABLET ORAL at 13:05

## 2019-08-04 RX ADMIN — FUROSEMIDE 20 MG: 10 INJECTION, SOLUTION INTRAMUSCULAR; INTRAVENOUS at 18:02

## 2019-08-04 RX ADMIN — PANTOPRAZOLE SODIUM 40 MG: 40 INJECTION, POWDER, LYOPHILIZED, FOR SOLUTION INTRAVENOUS at 05:19

## 2019-08-04 RX ADMIN — PANTOPRAZOLE SODIUM 40 MG: 40 INJECTION, POWDER, LYOPHILIZED, FOR SOLUTION INTRAVENOUS at 18:02

## 2019-08-04 RX ADMIN — POTASSIUM CHLORIDE 40 MEQ: 20 TABLET, EXTENDED RELEASE ORAL at 10:22

## 2019-08-04 RX ADMIN — SUCRALFATE 1 G: 1 TABLET ORAL at 05:15

## 2019-08-04 RX ADMIN — SUCRALFATE 1 G: 1 TABLET ORAL at 18:02

## 2019-08-04 ASSESSMENT — ENCOUNTER SYMPTOMS
TINGLING: 0
PHOTOPHOBIA: 0
ORTHOPNEA: 0
NERVOUS/ANXIOUS: 0
NAUSEA: 0
SPUTUM PRODUCTION: 0
NECK PAIN: 0
POLYDIPSIA: 0
CHILLS: 0
BACK PAIN: 0
HEMOPTYSIS: 0
PALPITATIONS: 0
COUGH: 0
SORE THROAT: 0
ABDOMINAL PAIN: 0
VOMITING: 0
FEVER: 0
HEADACHES: 0
CLAUDICATION: 0
DOUBLE VISION: 0
BLOOD IN STOOL: 0
WHEEZING: 0
SHORTNESS OF BREATH: 0
DIZZINESS: 0

## 2019-08-04 ASSESSMENT — LIFESTYLE VARIABLES: SUBSTANCE_ABUSE: 0

## 2019-08-04 NOTE — CARE PLAN
Problem: Safety  Goal: Will remain free from injury  Outcome: PROGRESSING AS EXPECTED  Note:   Patient educated on fall risks. Bed alarm on. Call light within reach. Fall precautions in place. Patient verbalized understanding of fall risks.      Problem: Venous Thromboembolism (VTW)/Deep Vein Thrombosis (DVT) Prevention:  Goal: Patient will participate in Venous Thrombosis (VTE)/Deep Vein Thrombosis (DVT)Prevention Measures  Outcome: PROGRESSING AS EXPECTED  Intervention: Encourage ambulation/mobilization at level directed by Physical Therapy in collaboration with Interdisciplinary Team  Note:   Patient ambulating to the bathroom. Understands importance of getting up and moving around to decrease risk of VTE.

## 2019-08-04 NOTE — ASSESSMENT & PLAN NOTE
- Bilateral lower extremity edema with pain and tenderness over tibial region  - Likely due to increase hydrostatic pressure secondary to chronic venous pathology.     Venous doppler LE ultrasound negative for DVT and Echocardiogram showed LVEF 70%        - Urinalysis with random protein to creatinine ratio may be done outpatient  - Patient has appointment with vascular clinic

## 2019-08-04 NOTE — PROGRESS NOTES
Internal Medicine Interval Note  Note Author: Sherry Ruiz M.D.     Name Jayshree Reinoso     1950   Age/Sex 68 y.o. female   MRN [id number]   Code Status DNR/DNI     After 5PM or if no immediate response to page, please call for cross-coverage  Attending/Team: Dr. Thompson See Patient List for primary contact information  Call [phone] to page    1st Call - Day Intern (R1):   Dr. Mayo 2nd Call - Day Sr. Resident (R2/R3):   Dr. Ruiz         Reason for interval visit  (Principal Problem)   Dizziness secondary to acute/subacute gastrointestinal bleed  Bilateral lower extremity edema      Interval Problem Daily Status Update  (24 hours, problem oriented, brief subjective history, new lab/imaging data pertinent to that problem)   No acute events overnight.  No acute complaints this morning.  No more episodes of bleeding per patient.  Denies dizziness though has only been up to the bathroom.  Denies chest pain, abd pain, sob, orthopnea.       Review of Systems   Constitutional: Negative for chills and fever.   HENT: Negative for congestion and sore throat.    Eyes: Negative for double vision and photophobia.   Respiratory: Negative for cough, hemoptysis, sputum production, shortness of breath and wheezing.    Cardiovascular: Positive for leg swelling (b/l 2-3+ pitting edema). Negative for chest pain, palpitations, orthopnea and claudication.   Gastrointestinal: Negative for abdominal pain, blood in stool (none since day of admission), nausea and vomiting.   Musculoskeletal: Negative for back pain and neck pain.   Skin: Negative for itching and rash.   Neurological: Negative for dizziness (none since admission), tingling and headaches.   Endo/Heme/Allergies: Negative for environmental allergies and polydipsia.   Psychiatric/Behavioral: Negative for substance abuse. The patient is not nervous/anxious.        Disposition/Barriers to discharge:   Remain inpatient for further 1 unit blood transfusion  (8/4), echocardiogram, urine studies.     Consultants/Specialty  PCP: Wily Garcia M.D.      Quality Measures  Quality-Core Measures   Reviewed items::  EKG reviewed, Radiology images reviewed, Labs reviewed and Medications reviewed  Lloyd catheter::  No Lloyd  DVT prophylaxis - mechanical:  SCDs (chemical dvt ppx contraindicated given recent acute/subacute gastrointestinal bleeding)      Physical Exam       Vitals:    08/04/19 0515 08/04/19 0800 08/04/19 1127 08/04/19 1445   BP: 109/77 137/78 125/83 119/76   Pulse: 95 82 86 81   Resp: 17 15 14 15   Temp: 36.4 °C (97.6 °F) 36.4 °C (97.6 °F) 36.8 °C (98.2 °F) 37.4 °C (99.3 °F)   TempSrc: Temporal Temporal Temporal Temporal   SpO2: 95% 95% 97% 91%   Weight:       Height:         Body mass index is 26.52 kg/m². Weight: (pt stated that she will wait till bathroom is needed )  Oxygen Therapy:  Pulse Oximetry: 91 %, O2 (LPM): 0, O2 Delivery: None (Room Air)    Physical Exam   Constitutional: She is oriented to person, place, and time and well-developed, well-nourished, and in no distress. No distress.   HENT:   Head: Normocephalic and atraumatic.   Eyes: Pupils are equal, round, and reactive to light.   Neck: No JVD present.   Cardiovascular: Normal rate, regular rhythm and normal heart sounds.   Pulmonary/Chest: Effort normal and breath sounds normal. No stridor. No respiratory distress. She has no wheezes.   Abdominal: Soft. Bowel sounds are normal. She exhibits no distension. There is no tenderness. There is no rebound.   Musculoskeletal: She exhibits edema (b/l LE pitting edema present, 2-3+).   Lymphadenopathy:     She has no cervical adenopathy.   Neurological: She is alert and oriented to person, place, and time. No cranial nerve deficit.   Skin: Skin is warm and dry. She is not diaphoretic.   Psychiatric: Affect and judgment normal.   Vitals reviewed.            Assessment/Plan     * Anemia due to GI blood loss- (present on admission)  Assessment & Plan  -  "Acute blood loss anemia likely secondary GI blood loss.  - Orthostatic hypotension due to GI bleed and dehydration.  - Hx of recent hospitalization due to GI blood loss requiring 1 U PRBC, 7/25/19-7/29/19; Colonoscopy showed diverticulosis and heme stained wall, EGD showed evidence of duodenitis and gastritis; GI recommend tagged RBC scan vs CTA at that time.    - This admission,  CTA abdomen pelvis showed no active bleeding.   - Given hgb 7.1 this morning (8/4), transfusing another 1 u pRBC.  Alongside, also give 1 dose of IV Lasix 20 mg given patient's LE edema and pending echocardiogram.   - Check hemogram this afternoon (8/4) - 2PM.    - Maintain two large bore IV for possible need for IVF or further transfusion.   - IV pantoprazole 40 mg twice daily to reduce risk of rebleed.   - CBC in the morning.     GI bleed- (present on admission)  Assessment & Plan  See \"Anemia due to GI blood loss\" problem.     Edema, lower extremity  Assessment & Plan  - Bilateral lower extremity edema with pain and tenderness over tibial region  - Likely due to increase hydrostatic pressure secondary to chronic venous stasis vs cardiac etiology.  Venous doppler LE ultrasound negative for DVT.   - Echocardiogram to evaluate for cardiac etiology such as left or right heart failure pending.    - Urinalysis with random protein to creatinine ratio pending.     Dizziness  Assessment & Plan  Likely due to blood loss anemia and dehydration as above      Dementia- (present on admission)  Assessment & Plan  Dementia/ Early alzheimers  Patient alert and oriented x 3    Continue home medication Aricept    Hypothyroid  Assessment & Plan  Continue levothyroxine 100mcg      "

## 2019-08-04 NOTE — PROGRESS NOTES
Morning report received at bedside. Care assumed. Pt alert and awake sitting up in bed. No complaints of pain or discomfort. AOx4 and on RA. Tele monitor on. Bed in lowest position and locked. Call light and all belongings within reach. No further needs at this time.

## 2019-08-04 NOTE — ASSESSMENT & PLAN NOTE
"See \"Anemia due to GI blood loss\" problem.     Recommended patient to start OTC docusate and/or miralax to ease stool passage.   "

## 2019-08-04 NOTE — CARE PLAN
Problem: Safety  Goal: Will remain free from injury  Outcome: PROGRESSING AS EXPECTED  Note:   Pt remains free from falls at this time. Safety precautions in place. Pt educated on calling for assistance when needed.       Problem: Infection  Goal: Will remain free from infection  Outcome: PROGRESSING AS EXPECTED  Note:   Patient WBC within normal limits. No open wounds noted on patient. Patient does not complain of SOB. Patient vital signs are stable.

## 2019-08-04 NOTE — PROGRESS NOTES
2 RN skin check completed with Alicia LEE.   Devices in place None.  Skin assessed under devices N/A.  Confirmed pressure ulcers found on NONE.  The following interventions in place Q2hr self turns, pillows for positioning and barrier cream.

## 2019-08-05 ENCOUNTER — PATIENT OUTREACH (OUTPATIENT)
Dept: HEALTH INFORMATION MANAGEMENT | Facility: OTHER | Age: 69
End: 2019-08-05

## 2019-08-05 VITALS
HEART RATE: 77 BPM | WEIGHT: 150.79 LBS | DIASTOLIC BLOOD PRESSURE: 72 MMHG | BODY MASS INDEX: 27.75 KG/M2 | HEIGHT: 62 IN | OXYGEN SATURATION: 95 % | RESPIRATION RATE: 17 BRPM | SYSTOLIC BLOOD PRESSURE: 116 MMHG | TEMPERATURE: 97.8 F

## 2019-08-05 LAB
ANION GAP SERPL CALC-SCNC: 11 MMOL/L (ref 0–11.9)
BUN SERPL-MCNC: 7 MG/DL (ref 8–22)
CALCIUM SERPL-MCNC: 8.5 MG/DL (ref 8.5–10.5)
CHLORIDE SERPL-SCNC: 110 MMOL/L (ref 96–112)
CO2 SERPL-SCNC: 21 MMOL/L (ref 20–33)
CREAT SERPL-MCNC: 0.59 MG/DL (ref 0.5–1.4)
ERYTHROCYTE [DISTWIDTH] IN BLOOD BY AUTOMATED COUNT: 54.6 FL (ref 35.9–50)
GLUCOSE SERPL-MCNC: 92 MG/DL (ref 65–99)
HCT VFR BLD AUTO: 31 % (ref 37–47)
HCT VFR BLD AUTO: 36 % (ref 37–47)
HGB BLD-MCNC: 10.1 G/DL (ref 12–16)
HGB BLD-MCNC: 11.2 G/DL (ref 12–16)
LV EJECT FRACT MOD 2C 99903: 76.57
LV EJECT FRACT MOD 4C 99902: 71.72
LV EJECT FRACT MOD BP 99901: 74.1
MCH RBC QN AUTO: 30.6 PG (ref 27–33)
MCHC RBC AUTO-ENTMCNC: 32.6 G/DL (ref 33.6–35)
MCV RBC AUTO: 93.9 FL (ref 81.4–97.8)
PLATELET # BLD AUTO: 423 K/UL (ref 164–446)
PMV BLD AUTO: 9.5 FL (ref 9–12.9)
POTASSIUM SERPL-SCNC: 3.8 MMOL/L (ref 3.6–5.5)
RBC # BLD AUTO: 3.3 M/UL (ref 4.2–5.4)
SODIUM SERPL-SCNC: 142 MMOL/L (ref 135–145)
WBC # BLD AUTO: 9.1 K/UL (ref 4.8–10.8)

## 2019-08-05 PROCEDURE — C9113 INJ PANTOPRAZOLE SODIUM, VIA: HCPCS | Performed by: STUDENT IN AN ORGANIZED HEALTH CARE EDUCATION/TRAINING PROGRAM

## 2019-08-05 PROCEDURE — A9270 NON-COVERED ITEM OR SERVICE: HCPCS | Performed by: STUDENT IN AN ORGANIZED HEALTH CARE EDUCATION/TRAINING PROGRAM

## 2019-08-05 PROCEDURE — 85027 COMPLETE CBC AUTOMATED: CPT

## 2019-08-05 PROCEDURE — 700111 HCHG RX REV CODE 636 W/ 250 OVERRIDE (IP): Performed by: STUDENT IN AN ORGANIZED HEALTH CARE EDUCATION/TRAINING PROGRAM

## 2019-08-05 PROCEDURE — 85014 HEMATOCRIT: CPT

## 2019-08-05 PROCEDURE — 36415 COLL VENOUS BLD VENIPUNCTURE: CPT

## 2019-08-05 PROCEDURE — 700102 HCHG RX REV CODE 250 W/ 637 OVERRIDE(OP): Performed by: STUDENT IN AN ORGANIZED HEALTH CARE EDUCATION/TRAINING PROGRAM

## 2019-08-05 PROCEDURE — 93306 TTE W/DOPPLER COMPLETE: CPT | Mod: 26 | Performed by: INTERNAL MEDICINE

## 2019-08-05 PROCEDURE — 85018 HEMOGLOBIN: CPT

## 2019-08-05 PROCEDURE — 80048 BASIC METABOLIC PNL TOTAL CA: CPT

## 2019-08-05 PROCEDURE — 99238 HOSP IP/OBS DSCHRG MGMT 30/<: CPT | Mod: GC | Performed by: INTERNAL MEDICINE

## 2019-08-05 RX ORDER — ACETAMINOPHEN 325 MG/1
650 TABLET ORAL EVERY 4 HOURS PRN
Status: DISCONTINUED | OUTPATIENT
Start: 2019-08-05 | End: 2019-08-05 | Stop reason: HOSPADM

## 2019-08-05 RX ADMIN — LEVOTHYROXINE SODIUM 100 MCG: 100 TABLET ORAL at 05:37

## 2019-08-05 RX ADMIN — PANTOPRAZOLE SODIUM 40 MG: 40 INJECTION, POWDER, LYOPHILIZED, FOR SOLUTION INTRAVENOUS at 05:38

## 2019-08-05 RX ADMIN — SUCRALFATE 1 G: 1 TABLET ORAL at 05:38

## 2019-08-05 RX ADMIN — SENNOSIDES, DOCUSATE SODIUM 2 TABLET: 50; 8.6 TABLET, FILM COATED ORAL at 05:37

## 2019-08-05 RX ADMIN — ACETAMINOPHEN 650 MG: 325 TABLET, FILM COATED ORAL at 06:19

## 2019-08-05 ASSESSMENT — ENCOUNTER SYMPTOMS
SPUTUM PRODUCTION: 0
NERVOUS/ANXIOUS: 0
BACK PAIN: 0
WHEEZING: 0
CLAUDICATION: 0
HEMOPTYSIS: 0
HEADACHES: 0
POLYDIPSIA: 0
SORE THROAT: 0
PALPITATIONS: 0
VOMITING: 0
NAUSEA: 0
TINGLING: 0
DOUBLE VISION: 0
PHOTOPHOBIA: 0
ORTHOPNEA: 0
NECK PAIN: 0
ABDOMINAL PAIN: 0
DIZZINESS: 0
FEVER: 0
BLOOD IN STOOL: 0
COUGH: 0
CHILLS: 0
SHORTNESS OF BREATH: 0

## 2019-08-05 ASSESSMENT — LIFESTYLE VARIABLES: SUBSTANCE_ABUSE: 0

## 2019-08-05 NOTE — PROGRESS NOTES
Internal Medicine Interval Note  Note Author: Justin Mayo M.D.     Name Jayshree Reinoso     1950   Age/Sex 68 y.o. female   MRN [id number]   Code Status DNR/DNI     After 5PM or if no immediate response to page, please call for cross-coverage  Attending/Team: Dr. Thompson See Patient List for primary contact information  Call [phone] to page    1st Call - Day Intern (R1):   Dr. Mayo 2nd Call - Day Sr. Resident (R2/R3):   Dr. Ruiz         Reason for interval visit  (Principal Problem)   Dizziness secondary to acute/subacute gastrointestinal bleed  Bilateral lower extremity edema      Interval Problem Daily Status Update  (24 hours, problem oriented, brief subjective history, new lab/imaging data pertinent to that problem)   No acute events overnight.  No acute complaint.    Patient sitting comfortably in bed with  by her side. She states she has been walking to the restroom and hallway without dizziness. She denies any further blood in the stool.     Patient denies fever, chills, chest pain, palpitation, shortness of breath, abdominal pain.     Review of Systems   Constitutional: Negative for chills and fever.   HENT: Negative for congestion and sore throat.    Eyes: Negative for double vision and photophobia.   Respiratory: Negative for cough, hemoptysis, sputum production, shortness of breath and wheezing.    Cardiovascular: Positive for leg swelling. Negative for chest pain, palpitations, orthopnea and claudication.        Tenderness to palpation of LE only over bilateral tibial region.   Gastrointestinal: Negative for abdominal pain, blood in stool (none since day of admission), nausea and vomiting.   Musculoskeletal: Negative for back pain and neck pain.   Skin: Negative for itching and rash.   Neurological: Negative for dizziness (none since admission), tingling and headaches.   Endo/Heme/Allergies: Negative for environmental allergies and polydipsia.   Psychiatric/Behavioral:  Negative for substance abuse. The patient is not nervous/anxious.        Disposition/Barriers to discharge:   Patient is being discharged    Consultants/Specialty  PCP: Wily Garcia M.D.      Quality Measures  Quality-Core Measures   Reviewed items::  EKG reviewed, Radiology images reviewed, Labs reviewed and Medications reviewed  Lloyd catheter::  No Lloyd  DVT prophylaxis - mechanical:  SCDs (chemical dvt ppx contraindicated given recent acute/subacute gastrointestinal bleeding)      Physical Exam       Vitals:    08/04/19 2000 08/05/19 0000 08/05/19 0400 08/05/19 0800   BP: 148/93 116/75 143/90 116/72   Pulse: 85 79 94 77   Resp: 16 18 17 17   Temp: 35.9 °C (96.6 °F) 36.2 °C (97.1 °F) 36.6 °C (97.9 °F) 36.6 °C (97.8 °F)   TempSrc: Temporal Temporal Temporal Temporal   SpO2: 94% 94% 91% 95%   Weight: 68.4 kg (150 lb 12.7 oz)      Height:         Body mass index is 27.58 kg/m². Weight: 68.4 kg (150 lb 12.7 oz)  Oxygen Therapy:  Pulse Oximetry: 95 %, O2 (LPM): 0, O2 Delivery: None (Room Air)    Physical Exam   Constitutional: She is oriented to person, place, and time and well-developed, well-nourished, and in no distress. No distress.   HENT:   Head: Normocephalic and atraumatic.   Eyes: Pupils are equal, round, and reactive to light.   Neck: No JVD present.   Cardiovascular: Normal rate, regular rhythm and normal heart sounds.   Pulmonary/Chest: Effort normal and breath sounds normal. No stridor. No respiratory distress. She has no wheezes.   Abdominal: Soft. Bowel sounds are normal. She exhibits no distension. There is no tenderness. There is no rebound.   Musculoskeletal: She exhibits edema (b/l LE pitting edema present, 2-3+).   Lymphadenopathy:     She has no cervical adenopathy.   Neurological: She is alert and oriented to person, place, and time. No cranial nerve deficit.   Skin: Skin is warm and dry. She is not diaphoretic.   Psychiatric: Affect and judgment normal.   Vitals  "reviewed.            Assessment/Plan     * Anemia due to GI blood loss- (present on admission)  Assessment & Plan  - Acute blood loss anemia likely secondary GI blood loss.  - Hgb 7.1 (8/4), transfused 1U pRBC and Hgb 9.1 after transfusion  - Today, Hgb 10.1   - CTA abdomen showed no acute bleed, and dense diverticulosis    - Patient okay to discharge given hemoglobin is stable and has no further sign of bleeding  - Will need to follow with PCP and GI  - No further workup at this time given recent EGD and Colonoscopy  - Educated patient to keep hydrated    Edema, lower extremity  Assessment & Plan  - Bilateral lower extremity edema with pain and tenderness over tibial region  - Likely due to increase hydrostatic pressure secondary to chronic venous pathology.     Venous doppler LE ultrasound negative for DVT and Echocardiogram showed LVEF 70%        - Urinalysis with random protein to creatinine ratio may be done outpatient  - Patient has appointment with vascular clinic    Dizziness  Assessment & Plan  Likely due to blood loss anemia and dehydration as above      Dementia- (present on admission)  Assessment & Plan  Dementia/ Early alzheimers  Patient alert and oriented x 3    Continue home medication Aricept    GI bleed- (present on admission)  Assessment & Plan  See \"Anemia due to GI blood loss\" problem.     Recommended patient to start OTC docusate and/or miralax to ease stool passage.     Hypothyroid  Assessment & Plan  Continue levothyroxine 100mcg      "

## 2019-08-05 NOTE — PROGRESS NOTES
Report obtained from JESUS Bernstein. Patient resting in bed with call light and personal belongings in reach. Bed in low and locked position.Telemetry monitor in place. POC and whiteboard updated. Patient has no needs at this time.

## 2019-08-05 NOTE — PROGRESS NOTES
Pt d/c'd home with . AVS reviewed with both pt and , both deny questions. IV pulled, tele monitoring D/C'd, pt denies pain at time of DC and VSS. Brought out by staff member via WC.

## 2019-08-05 NOTE — DISCHARGE INSTRUCTIONS
Gastrointestinal Bleeding  Introduction  Gastrointestinal bleeding is bleeding somewhere along the path food travels through the body (digestive tract). This path is anywhere between the mouth and the opening of the butt (anus). You may have blood in your poop (stools) or have black poop. If you throw up (vomit), there may be blood in it.  This condition can be mild, serious, or even life-threatening. If you have a lot of bleeding, you may need to stay in the hospital.  Follow these instructions at home:  · Take over-the-counter and prescription medicines only as told by your doctor.  · Eat foods that have a lot of fiber in them. These foods include whole grains, fruits, and vegetables. You can also try eating 1-3 prunes each day.  · Drink enough fluid to keep your pee (urine) clear or pale yellow.  · Keep all follow-up visits as told by your doctor. This is important.  Contact a doctor if:  · Your symptoms do not get better.  Get help right away if:  · Your bleeding gets worse.  · You feel dizzy or you pass out (faint).  · You feel weak.  · You have very bad cramps in your back or belly (abdomen).  · You pass large clumps of blood (clots) in your poop.  · Your symptoms are getting worse.  This information is not intended to replace advice given to you by your health care provider. Make sure you discuss any questions you have with your health care provider.  Document Released: 09/26/2009 Document Revised: 05/25/2017 Document Reviewed: 06/06/2016  © 2017 [employer]vier            Discharge Instructions    Discharged to home by car with relative. Discharged via wheelchair, hospital escort: Yes.  Special equipment needed: Not Applicable    Be sure to schedule a follow-up appointment with your primary care doctor or any specialists as instructed.     Discharge Plan:   Influenza Vaccine Indication: Indicated: Not available from distributor/    I understand that a diet low in cholesterol, fat, and sodium is  recommended for good health. Unless I have been given specific instructions below for another diet, I accept this instruction as my diet prescription.   Other diet: regular    Special Instructions: None    · Is patient discharged on Warfarin / Coumadin?   No     Depression / Suicide Risk    As you are discharged from this [facility] facility, it is important to learn how to keep safe from harming yourself.    Recognize the warning signs:  · Abrupt changes in personality, positive or negative- including increase in energy   · Giving away possessions  · Change in eating patterns- significant weight changes-  positive or negative  · Change in sleeping patterns- unable to sleep or sleeping all the time   · Unwillingness or inability to communicate  · Depression  · Unusual sadness, discouragement and loneliness  · Talk of wanting to die  · Neglect of personal appearance   · Rebelliousness- reckless behavior  · Withdrawal from people/activities they love  · Confusion- inability to concentrate     If you or a loved one observes any of these behaviors or has concerns about self-harm, here's what you can do:  · Talk about it- your feelings and reasons for harming yourself  · Remove any means that you might use to hurt yourself (examples: pills, rope, extension cords, firearm)  · Get professional help from the community (Mental Health, Substance Abuse, psychological counseling)  · Do not be alone:Call your Safe Contact- someone whom you trust who will be there for you.  · Call your local CRISIS HOTLINE 474-3367 or 137-404-2772  · Call your local Children's Mobile Crisis Response Team [place] (718) 823-8555 or www.Nerdies  · Call the toll free National Suicide Prevention Hotlines   · National Suicide Prevention Lifeline 661-218-EQVV (3068)  · National Hope Line Network 800-SUICIDE (395-2804)

## 2019-08-05 NOTE — DISCHARGE SUMMARY
Internal Medicine Discharge Summary  Note Author: Sherry Ruiz M.D.       Name Jayshree Reinoso     1950   Age/Sex 68 y.o. female   MRN [id number]         Admit Date:  8/3/2019       Discharge Date:   2019    Service:   [facility] Internal Medicine Green Team  Attending Physician(s):   Dr. Graham       Senior Resident(s):   Dr. Ruiz  Mark Resident(s):   Dr. Mayo  PCP: Wily Garcia M.D.    Primary Diagnosis:   Anemia secondary to gastrointestinal bleed, likely diverticular bleed.    Secondary Diagnoses:                Principal Problem:    Anemia due to GI blood loss POA: Yes  Active Problems:    GI bleed POA: Yes    Dementia POA: Yes    Dizziness POA: Unknown    Edema, lower extremity POA: Unknown  Resolved Problems:    * No resolved hospital problems. *      Hospital Summary (Brief Narrative):       68F with PMHx Alzheimer's, recent hospitalization for GI bleed whereupon endoscopy showed gastritis, duodenitis and colonoscopy showed diverticulosis, who presented with dizziness on ambulation and blood in her stool and was admitted for evaluation for repeat GI bleed.  Patient's hemoglobin on admission was 6.5.  She was transfused 1 unit of packed red blood cells.  Was also treated with IV fluids.  She was investigated with CT angiogram of abdomen/pelvis, which showed no acute bleed, though did show dense diverticula (see report below).  Her H&H were monitored, requiring an additional 1 u pRBCs (Hgb 7.1 at the time).  Her hemoglobin stabilized and was 11.2 on discharge.  By discharge she was ambulating without symptoms.    Patient also with 2-month history of lower extremity edema.  She was evaluated with bilateral lower extremity duplex ultrasound to rule out DVT, which was negative, as well as echo to evaluate for cardiac etiology of edema.  Echo was unremarkable (see report below was).  Patient's  reports patient is being evaluated for vein mapping outpatient.  Patient to  "follow-up with PCP upon discharge for urine studies to make certain she does not have nephrotic syndrome.    Patient was discharged in improved and stable condition with follow-up with her PCP within 1 week of discharge.  She is to follow-up with GI outpatient as well as previously scheduled.      Patient /Hospital Summary (Details -- Problem Oriented) :          GI bleed  Assessment & Plan  See \"Anemia due to GI blood loss\" problem.     Recommended patient to start OTC docusate and/or miralax to ease stool passage.     * Anemia due to GI blood loss  Assessment & Plan  - Acute blood loss anemia likely secondary GI blood loss.  - Hgb 7.1 (8/4), transfused 1U pRBC and Hgb 9.1 after transfusion  - Today, Hgb 10.1   - CTA abdomen showed no acute bleed, and dense diverticulosis    - Patient okay to discharge given hemoglobin is stable and has no further sign of bleeding  - Will need to follow with PCP and GI  - No further workup at this time given recent EGD and Colonoscopy  - Educated patient to keep hydrated    Edema, lower extremity  Assessment & Plan  - Bilateral lower extremity edema with pain and tenderness over tibial region  - Likely due to increase hydrostatic pressure secondary to chronic venous pathology.     Venous doppler LE ultrasound negative for DVT and Echocardiogram showed LVEF 70%        - Urinalysis with random protein to creatinine ratio may be done outpatient  - Patient has appointment with vascular clinic    Dizziness  Assessment & Plan  Likely due to blood loss anemia and dehydration as above      Dementia  Assessment & Plan  Dementia/ Early alzheimers  Patient alert and oriented x 3    Continue home medication Aricept    Hypothyroid  Assessment & Plan  Continue levothyroxine 100mcg        Imaging/ Testing:      US-EXTREMITY VENOUS LOWER BILAT   Final Result      EC-ECHOCARDIOGRAM COMPLETE W/O CONT   Final Result      CTA ABDOMEN PELVIS W & W/O POST PROCESS   Final Result      1.  No active GI " "hemorrhage identified.      2.  Dense diverticulosis of the sigmoid colon. No CT evidence of acute diverticulitis. No area of diverticular hemorrhage discernible.      3.  Cholecystectomy and appendectomy changes.      DX-CHEST-PORTABLE (1 VIEW)   Final Result         Patchy left basilar opacities, atelectasis consolidation.        Bilateral lower externally Doppler ultrasound - 8/3/2019:  \"CONCLUSIONS   Normal bilateral superficial and deep venous examination of the lower    extremities.\"    Transthoracic echocardiogram - 8/4/2019:  \"CONCLUSIONS  Normal right and left ventricular size and function.   Left ventricular ejection fraction is visually estimated to be 65%.  No significant valve disease or flow abnormalities.   Right heart pressures are normal.    Compared to the previous echocardiogram performed on 9/27/16: There has   been no significant change. \"    Discharge Medications:         Medication Reconciliation: Completed       Medication List      CONTINUE taking these medications      Instructions   CALCIUM-MAGNESIUM PO   Take 1 Tab by mouth every evening. 1000/500mg  Dose:  1 Tab     Co Q 10 100 MG Caps   Take 200 mg by mouth every morning.  Dose:  200 mg     donepezil 10 MG tablet  Commonly known as:  ARICEPT   Take 1 Tab by mouth every evening.  Dose:  10 mg     levothyroxine 100 MCG Tabs  Commonly known as:  SYNTHROID   Take 100 mcg by mouth Every morning on an empty stomach.  Dose:  100 mcg     Melatonin 10 MG Tabs   Take 10 mg by mouth every evening.  Dose:  10 mg     omeprazole 20 MG delayed-release capsule  Commonly known as:  PRILOSEC   Take 20 mg by mouth every evening.  Dose:  20 mg     sucralfate 1 GM Tabs  Commonly known as:  CARAFATE   Take 1 g by mouth 3 times a day.  Dose:  1 g     Vitamin A & D 5000-400 units Caps   Take 1 Tab by mouth every evening.  Dose:  1 Tab     VITAMIN B COMPLEX PO   Take 1 Tab by mouth every evening.  Dose:  1 Tab     vitamin k 100 MCG tablet   Take 100 mcg by " "mouth every morning.  Dose:  100 mcg        STOP taking these medications    NON SPECIFIED     Potassium 99 MG Tabs            Disposition:   Home    Diet:   As tolerated, no red colored food until follow up with PCP.     Activity:   As tolerated    Instructions:      The patient was instructed to return to the ER in the event of worsening symptoms. I have counseled the patient on the importance of compliance and the patient has agreed to proceed with all medical recommendations and follow up plan indicated above.   The patient understands that all medications come with benefits and risks. Risks may include permanent injury or death and these risks can be minimized with close reassessment and monitoring.        Primary Care Provider:   Wily Garcia M.D.  Follow up appointment details :      Wily Garcia M.D.  [address] [address]  [phone]      PLEASE CONTACT PCP OFFICE DIRECTLY TO ARRANGE A FOLLOW UP APPOINTMENT. THANK YOU      Summary of follow up issues:   Patient to follow-up with PCP within 1 week of discharge regarding evaluation for further dizziness or other signs of GI bleed post discharge as well as follow up re: LE edema (see \"hospital course\").    Patient to follow-up with gastroenterology as previously scheduled.    Discharge Time (Minutes) :    45 minutes  Hospital Course Type: Inpatient Stay < 2 midnights, patient recovered more rapidly than anticipated      Condition on Discharge    ______________________________________________________________________    Interval history/exam for day of discharge:    Please see daily progress note.      Most Recent Labs:    Lab Results   Component Value Date/Time    WBC 9.1 08/05/2019 12:25 AM    RBC 3.30 (L) 08/05/2019 12:25 AM    HEMOGLOBIN 11.2 (L) 08/05/2019 05:51 AM    HEMATOCRIT 36.0 (L) 08/05/2019 05:51 AM    MCV 93.9 08/05/2019 12:25 AM    MCH 30.6 08/05/2019 12:25 AM    MCHC 32.6 (L) 08/05/2019 12:25 AM    MPV 9.5 08/05/2019 " 12:25 AM    NEUTSPOLYS 57.90 08/04/2019 02:40 AM    LYMPHOCYTES 29.20 08/04/2019 02:40 AM    MONOCYTES 10.40 08/04/2019 02:40 AM    EOSINOPHILS 1.70 08/04/2019 02:40 AM    BASOPHILS 0.40 08/04/2019 02:40 AM    HYPOCHROMIA 1+ 04/12/2014 07:21 AM      Lab Results   Component Value Date/Time    SODIUM 142 08/05/2019 12:25 AM    POTASSIUM 3.8 08/05/2019 12:25 AM    CHLORIDE 110 08/05/2019 12:25 AM    CO2 21 08/05/2019 12:25 AM    GLUCOSE 92 08/05/2019 12:25 AM    BUN 7 (L) 08/05/2019 12:25 AM    CREATININE 0.59 08/05/2019 12:25 AM    CREATININE 0.8 04/24/2009 03:26 PM      Lab Results   Component Value Date/Time    ALTSGPT 12 08/04/2019 02:40 AM    ASTSGOT 20 08/04/2019 02:40 AM    ALKPHOSPHAT 51 08/04/2019 02:40 AM    TBILIRUBIN 0.4 08/04/2019 02:40 AM    LIPASE 23 08/03/2019 11:10 AM    ALBUMIN 3.1 (L) 08/04/2019 02:40 AM    GLOBULIN 2.2 08/04/2019 02:40 AM    INR 0.99 08/03/2019 11:10 AM     Lab Results   Component Value Date/Time    PROTHROMBTM 13.2 08/03/2019 11:10 AM    INR 0.99 08/03/2019 11:10 AM

## 2021-01-15 DIAGNOSIS — Z23 NEED FOR VACCINATION: ICD-10-CM

## 2022-11-01 ENCOUNTER — HOSPITAL ENCOUNTER (INPATIENT)
Facility: MEDICAL CENTER | Age: 72
LOS: 6 days | DRG: 377 | End: 2022-11-07
Attending: EMERGENCY MEDICINE | Admitting: INTERNAL MEDICINE
Payer: COMMERCIAL

## 2022-11-01 ENCOUNTER — APPOINTMENT (OUTPATIENT)
Dept: RADIOLOGY | Facility: MEDICAL CENTER | Age: 72
DRG: 377 | End: 2022-11-01
Attending: EMERGENCY MEDICINE
Payer: COMMERCIAL

## 2022-11-01 DIAGNOSIS — R55 SYNCOPE, UNSPECIFIED SYNCOPE TYPE: ICD-10-CM

## 2022-11-01 DIAGNOSIS — R53.81 DEBILITY: ICD-10-CM

## 2022-11-01 DIAGNOSIS — K92.2 GASTROINTESTINAL HEMORRHAGE, UNSPECIFIED GASTROINTESTINAL HEMORRHAGE TYPE: ICD-10-CM

## 2022-11-01 LAB
ABO GROUP BLD: NORMAL
ALBUMIN SERPL BCP-MCNC: 3.7 G/DL (ref 3.2–4.9)
ALBUMIN/GLOB SERPL: 1.3 G/DL
ALP SERPL-CCNC: 81 U/L (ref 30–99)
ALT SERPL-CCNC: 13 U/L (ref 2–50)
ANION GAP SERPL CALC-SCNC: 12 MMOL/L (ref 7–16)
APTT PPP: <20 SEC (ref 24.7–36)
AST SERPL-CCNC: 17 U/L (ref 12–45)
BARCODED ABORH UBTYP: 5100
BARCODED PRD CODE UBPRD: NORMAL
BARCODED UNIT NUM UBUNT: NORMAL
BASOPHILS # BLD AUTO: 0.7 % (ref 0–1.8)
BASOPHILS # BLD: 0.08 K/UL (ref 0–0.12)
BILIRUB SERPL-MCNC: 0.2 MG/DL (ref 0.1–1.5)
BLD GP AB SCN SERPL QL: NORMAL
BUN SERPL-MCNC: 21 MG/DL (ref 8–22)
CALCIUM SERPL-MCNC: 8.5 MG/DL (ref 8.5–10.5)
CHLORIDE SERPL-SCNC: 111 MMOL/L (ref 96–112)
CO2 SERPL-SCNC: 18 MMOL/L (ref 20–33)
COMPONENT R 8504R: NORMAL
CREAT SERPL-MCNC: 0.59 MG/DL (ref 0.5–1.4)
EOSINOPHIL # BLD AUTO: 0.27 K/UL (ref 0–0.51)
EOSINOPHIL NFR BLD: 2.4 % (ref 0–6.9)
ERYTHROCYTE [DISTWIDTH] IN BLOOD BY AUTOMATED COUNT: 52.3 FL (ref 35.9–50)
FERRITIN SERPL-MCNC: 36.3 NG/ML (ref 10–291)
GFR SERPLBLD CREATININE-BSD FMLA CKD-EPI: 96 ML/MIN/1.73 M 2
GLOBULIN SER CALC-MCNC: 2.9 G/DL (ref 1.9–3.5)
GLUCOSE SERPL-MCNC: 123 MG/DL (ref 65–99)
HCT VFR BLD AUTO: 37.1 % (ref 37–47)
HGB BLD-MCNC: 10.5 G/DL (ref 12–16)
HGB BLD-MCNC: 12 G/DL (ref 12–16)
IMM GRANULOCYTES # BLD AUTO: 0.04 K/UL (ref 0–0.11)
IMM GRANULOCYTES NFR BLD AUTO: 0.4 % (ref 0–0.9)
INR PPP: 1.07 (ref 0.87–1.13)
IRON SATN MFR SERPL: 21 % (ref 15–55)
IRON SERPL-MCNC: 58 UG/DL (ref 40–170)
LACTATE SERPL-SCNC: 2.4 MMOL/L (ref 0.5–2)
LIPASE SERPL-CCNC: 17 U/L (ref 11–82)
LYMPHOCYTES # BLD AUTO: 2.14 K/UL (ref 1–4.8)
LYMPHOCYTES NFR BLD: 19 % (ref 22–41)
MCH RBC QN AUTO: 30.9 PG (ref 27–33)
MCHC RBC AUTO-ENTMCNC: 32.3 G/DL (ref 33.6–35)
MCV RBC AUTO: 95.6 FL (ref 81.4–97.8)
MONOCYTES # BLD AUTO: 0.65 K/UL (ref 0–0.85)
MONOCYTES NFR BLD AUTO: 5.8 % (ref 0–13.4)
NEUTROPHILS # BLD AUTO: 8.09 K/UL (ref 2–7.15)
NEUTROPHILS NFR BLD: 71.7 % (ref 44–72)
NRBC # BLD AUTO: 0 K/UL
NRBC BLD-RTO: 0 /100 WBC
PLATELET # BLD AUTO: 270 K/UL (ref 164–446)
PMV BLD AUTO: 10.3 FL (ref 9–12.9)
POTASSIUM SERPL-SCNC: 3.8 MMOL/L (ref 3.6–5.5)
PRODUCT TYPE UPROD: NORMAL
PROT SERPL-MCNC: 6.6 G/DL (ref 6–8.2)
PROTHROMBIN TIME: 13.8 SEC (ref 12–14.6)
RBC # BLD AUTO: 3.88 M/UL (ref 4.2–5.4)
RH BLD: NORMAL
SODIUM SERPL-SCNC: 141 MMOL/L (ref 135–145)
TIBC SERPL-MCNC: 277 UG/DL (ref 250–450)
UIBC SERPL-MCNC: 219 UG/DL (ref 110–370)
UNIT STATUS USTAT: NORMAL
WBC # BLD AUTO: 11.3 K/UL (ref 4.8–10.8)

## 2022-11-01 PROCEDURE — 85018 HEMOGLOBIN: CPT

## 2022-11-01 PROCEDURE — 770006 HCHG ROOM/CARE - MED/SURG/GYN SEMI*

## 2022-11-01 PROCEDURE — 86901 BLOOD TYPING SEROLOGIC RH(D): CPT

## 2022-11-01 PROCEDURE — 83605 ASSAY OF LACTIC ACID: CPT

## 2022-11-01 PROCEDURE — 85610 PROTHROMBIN TIME: CPT

## 2022-11-01 PROCEDURE — 83690 ASSAY OF LIPASE: CPT

## 2022-11-01 PROCEDURE — 86850 RBC ANTIBODY SCREEN: CPT

## 2022-11-01 PROCEDURE — 99223 1ST HOSP IP/OBS HIGH 75: CPT | Mod: AI | Performed by: INTERNAL MEDICINE

## 2022-11-01 PROCEDURE — C9113 INJ PANTOPRAZOLE SODIUM, VIA: HCPCS | Performed by: INTERNAL MEDICINE

## 2022-11-01 PROCEDURE — 85730 THROMBOPLASTIN TIME PARTIAL: CPT

## 2022-11-01 PROCEDURE — 700102 HCHG RX REV CODE 250 W/ 637 OVERRIDE(OP): Performed by: INTERNAL MEDICINE

## 2022-11-01 PROCEDURE — 83550 IRON BINDING TEST: CPT

## 2022-11-01 PROCEDURE — 36415 COLL VENOUS BLD VENIPUNCTURE: CPT

## 2022-11-01 PROCEDURE — 99255 IP/OBS CONSLTJ NEW/EST HI 80: CPT | Mod: MISDOCU | Performed by: NURSE PRACTITIONER

## 2022-11-01 PROCEDURE — 71045 X-RAY EXAM CHEST 1 VIEW: CPT

## 2022-11-01 PROCEDURE — 83540 ASSAY OF IRON: CPT

## 2022-11-01 PROCEDURE — A9270 NON-COVERED ITEM OR SERVICE: HCPCS | Performed by: INTERNAL MEDICINE

## 2022-11-01 PROCEDURE — 700111 HCHG RX REV CODE 636 W/ 250 OVERRIDE (IP): Performed by: INTERNAL MEDICINE

## 2022-11-01 PROCEDURE — 80053 COMPREHEN METABOLIC PANEL: CPT

## 2022-11-01 PROCEDURE — 82728 ASSAY OF FERRITIN: CPT

## 2022-11-01 PROCEDURE — 74174 CTA ABD&PLVS W/CONTRAST: CPT

## 2022-11-01 PROCEDURE — 700117 HCHG RX CONTRAST REV CODE 255: Performed by: EMERGENCY MEDICINE

## 2022-11-01 PROCEDURE — 99285 EMERGENCY DEPT VISIT HI MDM: CPT

## 2022-11-01 PROCEDURE — 700105 HCHG RX REV CODE 258: Performed by: INTERNAL MEDICINE

## 2022-11-01 PROCEDURE — 85025 COMPLETE CBC W/AUTO DIFF WBC: CPT

## 2022-11-01 PROCEDURE — 86900 BLOOD TYPING SEROLOGIC ABO: CPT

## 2022-11-01 RX ORDER — CHOLECALCIFEROL (VITAMIN D3) 125 MCG
10 CAPSULE ORAL
Status: DISCONTINUED | OUTPATIENT
Start: 2022-11-01 | End: 2022-11-03

## 2022-11-01 RX ORDER — PANTOPRAZOLE SODIUM 40 MG/10ML
40 INJECTION, POWDER, LYOPHILIZED, FOR SOLUTION INTRAVENOUS 2 TIMES DAILY
Status: DISCONTINUED | OUTPATIENT
Start: 2022-11-01 | End: 2022-11-01

## 2022-11-01 RX ORDER — BISACODYL 10 MG
10 SUPPOSITORY, RECTAL RECTAL
Status: DISCONTINUED | OUTPATIENT
Start: 2022-11-01 | End: 2022-11-07 | Stop reason: HOSPADM

## 2022-11-01 RX ORDER — SODIUM CHLORIDE 9 MG/ML
INJECTION, SOLUTION INTRAVENOUS CONTINUOUS
Status: DISCONTINUED | OUTPATIENT
Start: 2022-11-01 | End: 2022-11-03

## 2022-11-01 RX ORDER — AMOXICILLIN 250 MG
2 CAPSULE ORAL 2 TIMES DAILY
Status: DISCONTINUED | OUTPATIENT
Start: 2022-11-01 | End: 2022-11-07 | Stop reason: HOSPADM

## 2022-11-01 RX ORDER — ACETAMINOPHEN 325 MG/1
650 TABLET ORAL EVERY 6 HOURS PRN
Status: DISCONTINUED | OUTPATIENT
Start: 2022-11-01 | End: 2022-11-07 | Stop reason: HOSPADM

## 2022-11-01 RX ORDER — LEVOTHYROXINE SODIUM 88 UG/1
88 TABLET ORAL
Status: DISCONTINUED | OUTPATIENT
Start: 2022-11-02 | End: 2022-11-07 | Stop reason: HOSPADM

## 2022-11-01 RX ORDER — PANTOPRAZOLE SODIUM 40 MG/10ML
40 INJECTION, POWDER, LYOPHILIZED, FOR SOLUTION INTRAVENOUS DAILY
Status: DISCONTINUED | OUTPATIENT
Start: 2022-11-01 | End: 2022-11-02

## 2022-11-01 RX ORDER — POLYETHYLENE GLYCOL 3350 17 G/17G
1 POWDER, FOR SOLUTION ORAL
Status: DISCONTINUED | OUTPATIENT
Start: 2022-11-01 | End: 2022-11-07 | Stop reason: HOSPADM

## 2022-11-01 RX ADMIN — Medication 10 MG: at 21:00

## 2022-11-01 RX ADMIN — SODIUM CHLORIDE: 9 INJECTION, SOLUTION INTRAVENOUS at 15:00

## 2022-11-01 RX ADMIN — PANTOPRAZOLE SODIUM 40 MG: 40 INJECTION, POWDER, FOR SOLUTION INTRAVENOUS at 21:00

## 2022-11-01 RX ADMIN — IOHEXOL 100 ML: 350 INJECTION, SOLUTION INTRAVENOUS at 13:59

## 2022-11-01 ASSESSMENT — COGNITIVE AND FUNCTIONAL STATUS - GENERAL
WALKING IN HOSPITAL ROOM: A LITTLE
DRESSING REGULAR UPPER BODY CLOTHING: A LITTLE
DRESSING REGULAR LOWER BODY CLOTHING: A LITTLE
MOVING FROM LYING ON BACK TO SITTING ON SIDE OF FLAT BED: A LITTLE
SUGGESTED CMS G CODE MODIFIER MOBILITY: CK
SUGGESTED CMS G CODE MODIFIER DAILY ACTIVITY: CK
STANDING UP FROM CHAIR USING ARMS: A LITTLE
CLIMB 3 TO 5 STEPS WITH RAILING: A LITTLE
HELP NEEDED FOR BATHING: A LITTLE
MOBILITY SCORE: 19
PERSONAL GROOMING: A LITTLE
MOVING TO AND FROM BED TO CHAIR: A LITTLE
TOILETING: A LITTLE
DAILY ACTIVITIY SCORE: 19

## 2022-11-01 ASSESSMENT — ENCOUNTER SYMPTOMS
ABDOMINAL PAIN: 0
FEVER: 0
SPEECH CHANGE: 0
VOMITING: 0
SHORTNESS OF BREATH: 0
PALPITATIONS: 0
SPUTUM PRODUCTION: 0
BLOOD IN STOOL: 1
FLANK PAIN: 0
MYALGIAS: 0
ORTHOPNEA: 0
MEMORY LOSS: 1
DIARRHEA: 0
WEAKNESS: 0
HEADACHES: 1
HEMOPTYSIS: 0
NAUSEA: 0
DIZZINESS: 0
NERVOUS/ANXIOUS: 0
CHILLS: 0
CONSTIPATION: 0
SORE THROAT: 0

## 2022-11-01 ASSESSMENT — FIBROSIS 4 INDEX: FIB4 SCORE: 1.25732044477718601

## 2022-11-01 ASSESSMENT — PAIN DESCRIPTION - PAIN TYPE: TYPE: ACUTE PAIN

## 2022-11-01 ASSESSMENT — LIFESTYLE VARIABLES: SUBSTANCE_ABUSE: 0

## 2022-11-01 NOTE — ED NOTES
Hospitalist and GI RN at .  Pt's spouse reports he never saw blood in pt's diaper; states he was told about it.  IV's assessed; IV's flush but unable to draw labs from LAC and RAC.  ED Phlebotomist contacted for Lactic Acid draw.

## 2022-11-01 NOTE — ED PROVIDER NOTES
ED Provider Note    This patient was cared for during the COVID-19 pandemic. History and physical exam may be limited/truncated by the inherent challenges of PPE and the need to decrease staff exposure to novel coronavirus. Some aspects of disease management may be different to protect staff and help slow the spread of disease. I verified that, if possible, the patient was wearing a mask and I was wearing appropriate PPE every time I encountered the patient.       CHIEF COMPLAINT  Chief Complaint   Patient presents with    Syncope       HPI  Jayshree Reinoso is a 72 y.o. female who presents for evaluation of syncope.  Her  says she has some early onset dementia but is typically oriented and otherwise does well at home.  They were at lunch today and all of a sudden she slumped over and was unresponsive for 25 or 30 seconds per her .  She seems to be doing better now.  He denies any recent illnesses.  Patient was noted to have some bright red blood coming down her leg while she was getting undressed per nursing staff.   reports that she had a diverticular bleed in 2016 he has been trying to be aware of any bloody stool she has had and does not know of any recently however in 2016 she required multiple transfusions.  Patient denies any abdominal pain.        REVIEW OF SYSTEMS  positive for bright red blood per rectum syncope, negative for abdominal pain rectal:. All other systems are negative.     PAST MEDICAL HISTORY   has a past medical history of Alzheimer's disease (HCC), Alzheimer's disease (HCC), Anesthesia, Arthritis, Backpain, Cancer (HCC) (2010), Hypothyroid, Other specified symptom associated with female genital organs, Pain, Renal disorder, Unspecified disorder of thyroid, and Unspecified urinary incontinence.    SOCIAL HISTORY  Social History     Tobacco Use    Smoking status: Never    Smokeless tobacco: Never   Substance and Sexual Activity    Alcohol use: No    Drug use: No    Sexual  activity: Not Currently       SURGICAL HISTORY   has a past surgical history that includes scleral buckling (7/9/08); rectus repair (10/9/08); hernia repair; inguinal hernia repair (12/18/2008); laparoscopy (5/7/2009); femoral hernia repair (5/7/2009); fusion, spine, lumbar, plif (2/27/2013); lumbar laminectomy diskectomy (2/27/2013); gyn surgery; vaginectomy (7/25/2011); gyn surgery; gyn surgery; knee arthroplasty total (Right, 7/11/2016); gastroscopy-endo (8/11/2016); gastroscopy-endo (N/A, 7/26/2019); and colonoscopy - endo (N/A, 7/27/2019).    CURRENT MEDICATIONS  Home Medications       Reviewed by Tracy Winston R.N. (Registered Nurse) on 11/01/22 at 1650  Med List Status: Complete     Medication Last Dose Status   B Complex Vitamins (VITAMIN B COMPLEX PO) 11/1/2022 Active   CALCIUM-MAGNESIUM PO 11/1/2022 Active   Coenzyme Q10 (CO Q 10 PO) 11/1/2022 Active   levothyroxine (SYNTHROID) 88 MCG Tab 11/1/2022 Active   Melatonin 10 MG Tab 10/31/2022 Active   vitamin k 100 MCG tablet 11/1/2022 Active   Vitamins A & D (VITAMIN A & D PO) 11/1/2022 Active                    ALLERGIES  No Known Allergies    PHYSICAL EXAM  VITAL SIGNS: /68   Pulse 88   Temp 36.8 °C (98.2 °F) (Temporal)   Resp 20   Wt 64 kg (141 lb 3.2 oz)   SpO2 96%   BMI 25.83 kg/m² .  Constitutional: Alert in no apparent distress.  HENT: No signs of trauma, Bilateral external ears normal, Nose normal.   Eyes: Pupils are equal and reactive, Conjunctiva normal, Non-icteric.   Neck:  No stridor.   Cardiovascular: Regular rate and rhythm, no murmurs.   Thorax & Lungs: Normal breath sounds, No respiratory distress, No wheezing, No chest tenderness.   Abdomen: Bowel sounds normal, Soft, No tenderness, No masses, No peritoneal signs.  Large amount of bright red blood coming from the rectum  Skin: Warm, Dry, No erythema, No rash.   Musculoskeletal:  no major deformities noted.   Neurologic: Alert,  No focal deficits noted.   Psychiatric: Affect  slightly confused, Mood normal.       DIAGNOSTIC STUDIES / PROCEDURES        LABS  Labs Reviewed   CBC WITH DIFFERENTIAL - Abnormal; Notable for the following components:       Result Value    WBC 11.3 (*)     RBC 3.88 (*)     MCHC 32.3 (*)     RDW 52.3 (*)     Lymphocytes 19.00 (*)     Neutrophils (Absolute) 8.09 (*)     All other components within normal limits    Narrative:     Indicate which anticoagulants the patient is on:->UNKNOWN   COMP METABOLIC PANEL - Abnormal; Notable for the following components:    Co2 18 (*)     Glucose 123 (*)     All other components within normal limits    Narrative:     Indicate which anticoagulants the patient is on:->UNKNOWN   APTT - Abnormal; Notable for the following components:    APTT <20.0 (*)     All other components within normal limits    Narrative:     Indicate which anticoagulants the patient is on:->UNKNOWN   LACTIC ACID - Abnormal; Notable for the following components:    Lactic Acid 2.4 (*)     All other components within normal limits   COD (ADULT)   LIPASE    Narrative:     Indicate which anticoagulants the patient is on:->UNKNOWN   PROTHROMBIN TIME    Narrative:     Indicate which anticoagulants the patient is on:->UNKNOWN   ESTIMATED GFR    Narrative:     Indicate which anticoagulants the patient is on:->UNKNOWN   FERRITIN   IRON/TOTAL IRON BIND    Narrative:     Indicate which anticoagulants the patient is on:->UNKNOWN   HGB   HGB   TRANSFUSE RBC ACTIVE BLEED-NURSING COMMUNICATION       RADIOLOGY  DX-CHEST-PORTABLE (1 VIEW)   Final Result      No acute cardiopulmonary abnormality.         CTA ABDOMEN PELVIS W & W/O POST PROCESS   Final Result      1.  No evidence of active arterial gastrointestinal bleeding.   2.  Colonic diverticulosis. No diverticulitis.   3.  A 4.5 cm right posterior rectocele.                Medical records reviewed for continuity of care.  Patient was admitted in 2019 for GI bleed.  Her hemoglobin on admission at that time was 6-1/2.  She  had an upper endoscopy that showed gastritis duodenitis and colonoscopy showed diverticulosis.  She was admitted twice in 2019 for GI bleed.  She was admitted in 2018 for GI bleed.  And in 2016 for GI bleed.    Differential Diagnosis includes but is not limited to: GI bleed, hypovolemia    COURSE & MEDICAL DECISION MAKING  Pertinent Labs & Imaging studies reviewed. (See chart for details)    This is a 72-year-old female that presents with a syncopal episode.  She is not hypotensive or tachycardic she does have evidence of a significant GI bleed on exam.  She has a history of recurrent diverticular bleeds.  I did do a CTA therefore and does not have any evidence of acute arterial bleeding.  I spoke with GI who will consult on the patient and recommends that continued supportive care.    Labs show actually normal hemoglobin of 12 minimally elevated lactate at 2.4.  I do think the patient requires hospitalization given her syncopal episode and evidence of GI.  I spoke with Dr. Mae, hospitalist is agreeable to consult for hospitalization.  Patient is in guarded condition.    FINAL IMPRESSION  1. Syncope, unspecified syncope type        2. Gastrointestinal hemorrhage, unspecified gastrointestinal hemorrhage type              This dictation has been creating using voice recognition software. The accuracy of the dictation is limited the abilities of the software.  I expect there may be some errors of grammar and possibly content. I made every attempt to manually correct the errors within my dictation. However errors related to this voice recognition software may still exist and should be interpreted within the appropriate context.      The note accurately reflects work and decisions made by me.  Cornelia Jean Baptiste M.D.  11/1/2022  7:24 PM

## 2022-11-01 NOTE — ED TRIAGE NOTES
"Pt bib ems c/o syncopal episode while at  lunch with . Per  pt was normal this am no complaints then suddenly slumped to the side and would not really answer any questions ems found pt to be hypotensive and \"soiled\" and A&Ox2. While undressing pt to place in gown pt was found to have a large amount of bright red blood with clots in her brief and running down her leg. Denies pain. Pt not able to provide hx for me.   "

## 2022-11-01 NOTE — PROGRESS NOTES
Pt admitted from ER via stretcher. She was able to walk from stretcher to bed with assist of 1 with a steady gait. Pt alert and oriented to person and family but otherwise confused. was here for a few minutes and then left for home.  Bed low and locked with alarm on and call light within easy reach for safety.

## 2022-11-01 NOTE — CONSULTS
Date of Consultation:  11/1/2022    Patient: : Jayshree Reinoso  MRN: [id number]    Referring Physician:  Cornelia Jean Baptiste M.D.     GI:MADONNA Adler     Reason for Consultation: Lower GI bleeding    History of Present Illness: This is a 72 year old female with a past medical pogih5un of dementia, hypothyroidism, diverticulosis, prior GI bleeding secondary to diverticulosis admitted 11/1/2022 for lower GI bleeding. Patient was very limited in providing history due to her dementia but her , Per, was at bedside to provide history. He states that the patient was in her normal state of health until prior to arrival. He states they were out at lunch and patient slumped over, he rushed to support her so she didn't fall, and patient was unresponsive for 30-60 seconds. When she awoke, she was complaining of abdominal pain and guarding her abdomen. EMS was called and stated that patient was hypotensive. Upon arrival to the emergency department, patient was noted to have gross amount of blood from rectum going down her legs. Patient reports that she had blood stool this morning only. She, furthermore, reports chronic headache but denies nausea, vomiting, dizziness, headache, shortness of breath, or weakness. She is normotensive. Denies pain. Hgb 12, plt 270, lactic acid 2.4, INR 1.07. BUN/Cr 35.5. CTA abdomen negative fo ractive arterial bleeding, diverticulosis noted but no diverticulitis, and a 4.5cm right posterior rectocele seen.   Of note, patient was admitted in 2016 and 2019 for acute blood loss, melena, and hematochezia due to GI bleeding. She had EGD and colonoscopy with GI consultants provider in 2016 and had repeat colonoscopy in 2019. No active bleeding was seen at that time. Patient did not follow up with GI outpatient since.   Denies current or previous tobacco use, alcohol use or recreational drug use.     Otherwise the patient is doing fine without complaints of fever/ chills/ weight loss/ appetite  change/ dysphagia/ odynophagia/ heartburn/ nausea/ vomiting/ bloating/ constipation/ melena.    Past Medical History:   Diagnosis Date    Cancer (HCC) 2010    skin melanoma    Alzheimer's disease (HCC)     Alzheimer's disease (HCC)     Anesthesia     PONV    Arthritis     RA and osteo    Backpain     Hypothyroid     Other specified symptom associated with female genital organs     vaginal mesh removed    Pain     bilateral knee    Renal disorder     renal fatigue    Unspecified disorder of thyroid     Unspecified urinary incontinence          Past Surgical History:   Procedure Laterality Date    COLONOSCOPY - ENDO N/A 7/27/2019    Procedure: COLONOSCOPY;  Surgeon: Guru Castañeda M.D.;  Location: ENDOSCOPY [facility];  Service: Gastroenterology    GASTROSCOPY-ENDO N/A 7/26/2019    Procedure: GASTROSCOPY;  Surgeon: Guru Castañeda M.D.;  Location: ENDOSCOPY [facility];  Service: Gastroenterology    GASTROSCOPY-ENDO  8/11/2016    Procedure: GASTROSCOPY-ENDO;  Surgeon: Ruchi Akers M.D.;  Location: SURGERY [facility];  Service:     KNEE ARTHROPLASTY TOTAL Right 7/11/2016    Procedure: KNEE ARTHROPLASTY TOTAL;  Surgeon: Rayray Valentine M.D.;  Location: SURGERY [facility];  Service:     FUSION, SPINE, LUMBAR, PLIF  2/27/2013    Performed by Sonny Flores M.D. at SURGERY [facility]    LUMBAR LAMINECTOMY DISKECTOMY  2/27/2013    Performed by Sonny Flores M.D. at SURGERY [facility]    VAGINECTOMY  7/25/2011    Performed by MICHELLE BURT at SURGERY SAME DAY [facility]    LAPAROSCOPY  5/7/2009    Performed by RODY SAENZ at SURGERY [facility]S    FEMORAL HERNIA REPAIR  5/7/2009    Performed by RODY SAENZ at SURGERY [facility] ORS    INGUINAL HERNIA REPAIR  12/18/2008    Performed by RODY SAENZ at SURGERY SAME DAY [facility]    RECTUS REPAIR  10/9/08    Performed by EFREN REYNOLDS at SURGERY SAME DAY [facility]    SCLERAL BUCKLING  7/9/08     Performed by JORGE DAVENPORT at SURGERY SAME DAY [facility]    GYN SURGERY      GYN SURGERY      vaginal mesh removed    GYN SURGERY      hysterectomy    HERNIA REPAIR         History reviewed. No pertinent family history.    Social History     Socioeconomic History    Marital status:    Tobacco Use    Smoking status: Never    Smokeless tobacco: Never   Substance and Sexual Activity    Alcohol use: No    Drug use: No    Sexual activity: Not Currently       Review of Systems   Constitutional:  Negative for chills, fever and malaise/fatigue.   HENT:  Negative for congestion and sore throat.    Respiratory:  Negative for hemoptysis, sputum production and shortness of breath.    Cardiovascular:  Positive for leg swelling. Negative for chest pain, palpitations and orthopnea.   Gastrointestinal:  Positive for blood in stool. Negative for abdominal pain, constipation, diarrhea, melena, nausea and vomiting.   Genitourinary:  Negative for dysuria and flank pain.   Musculoskeletal:  Negative for myalgias.   Neurological:  Positive for headaches (chronic). Negative for dizziness, speech change and weakness.   Psychiatric/Behavioral:  Positive for memory loss (hx of dementia). Negative for substance abuse. The patient is not nervous/anxious.    All other systems reviewed and are negative.      Physical Exam:  Vitals:    11/01/22 1244   BP: 109/65   Pulse: 63   Resp: 18   Temp: 35.9 °C (96.6 °F)   TempSrc: Temporal   SpO2: 96%   Weight: 68 kg (150 lb)       Physical Exam  Vitals and nursing note reviewed.   Constitutional:       General: She is not in acute distress.     Appearance: Normal appearance. She is not ill-appearing or toxic-appearing.   HENT:      Head: Normocephalic.      Nose: Nose normal.      Mouth/Throat:      Mouth: Mucous membranes are moist.      Pharynx: Oropharynx is clear.   Eyes:      General: No scleral icterus.     Conjunctiva/sclera: Conjunctivae normal.      Pupils: Pupils are equal, round,  and reactive to light.   Cardiovascular:      Rate and Rhythm: Normal rate and regular rhythm.      Pulses: Normal pulses.      Heart sounds: Normal heart sounds. No murmur heard.  Pulmonary:      Effort: Pulmonary effort is normal. No respiratory distress.      Breath sounds: Normal breath sounds.   Abdominal:      General: Abdomen is flat. Bowel sounds are normal. There is no distension.      Palpations: Abdomen is soft.      Tenderness: There is no abdominal tenderness. There is no guarding.   Genitourinary:     Comments: Dried blood from rectum, no active bleeding .   Musculoskeletal:      Right lower leg: Edema (trace) present.      Left lower leg: Edema (trace) present.   Skin:     General: Skin is warm and dry.      Capillary Refill: Capillary refill takes less than 2 seconds.      Coloration: Skin is pale.   Neurological:      General: No focal deficit present.      Mental Status: She is alert. Mental status is at baseline. She is disoriented.      Comments: AAO to self   Psychiatric:         Mood and Affect: Mood normal.         Behavior: Behavior normal.         Labs:      Recent Labs     11/01/22  1255   SODIUM 141   POTASSIUM 3.8   CHLORIDE 111   CO2 18*   GLUCOSE 123*   BUN 21     Recent Labs     11/01/22  1255   APTT <20.0*   INR 1.07       Recent Labs     11/01/22  1255   ASTSGOT 17   ALTSGPT 13   TBILIRUBIN 0.2   ALKPHOSPHAT 81   GLOBULIN 2.9   INR 1.07         Imaging:  CTA ABDOMEN PELVIS W & W/O POST PROCESS  Narrative: 11/1/2022 1:47 PM    HISTORY/REASON FOR EXAM:  Syncope, gastrointestinal bleeding    TECHNIQUE/EXAM DESCRIPTION:  CT angiogram of the abdomen and pelvis without and with contrast, with reconstructions.    Initial precontrast helical sections were obtained from the diaphragmatic domes to the lesser trochanters. Following this, 100 mL of Omnipaque 350 nonionic contrast was administered at 5.0 mL/sec and helical scanning was obtained from the diaphragmatic   domes through the lesser  trochanters. Thin section overlapping reconstruction interval was utilized and multiplanar volume reformatted were generated in the sagittal and coronal planes.    3D angiographic images were reviewed on PACS. Maximum intensity projection (MIP) images were generated and reviewed.    Low dose optimization technique was utilized for this CT exam including automated exposure control and adjustment of the mA and/or kV according to patient size.    COMPARISON:  8/3/2019.    FINDINGS:  AORTA AND BRANCH VESSELS:  No evidence of aortic aneurysm or dissection. No intramural hematoma. Atherosclerosis.  Patent origins of the celiac, superior mesenteric and renal arteries. Patent common iliac and visualized external iliac arteries.  No evidence of active arterial gastrointestinal extravasation.    CT ABDOMEN/PELVIS:  Lower chest: Areas of minimal atelectasis/scarring in the lower lungs.  Liver: No mass. No intrahepatic biliary dilatation.  Gallbladder: Cholecystectomy.  Common bile duct: Nondilated.  Pancreas: Unremarkable.  Spleen: No mass.  Adrenals: No mass.  Kidneys: No suspicious mass lesions. No hydronephrosis.  Stomach, small bowel, colon: No bowel wall thickening or obstruction. Colonic diverticulosis. Right posterior rectocele measuring approximately 4.5 cm.  Peritoneal cavity: No ascites.  Lymph nodes: No enlarged nodes by size criteria.  Pelvic organs: Hysterectomy. Normal bladder.    MUSCULOSKELETAL STRUCTURES: No acute fracture or destructive lesion. Lower lumbar spine instrumentation. Multilevel spondylosis.    3D angiographic/MIP images of the vasculature confirm the vascular findings as described above.  Impression: 1.  No evidence of active arterial gastrointestinal bleeding.  2.  Colonic diverticulosis. No diverticulitis.  3.  A 4.5 cm right posterior rectocele.  DX-CHEST-PORTABLE (1 VIEW)  Narrative: 11/1/2022 1:31 PM    HISTORY/REASON FOR EXAM:  Hematemesis.    TECHNIQUE/EXAM DESCRIPTION AND NUMBER OF  VIEWS:  Single portable view of the chest.    COMPARISON:  8/3/2019.    FINDINGS:    LUNGS: Minimal left basilar atelectasis. No focal consolidation. No effusions.  PNEUMOTHORAX: None.  LINES AND TUBES: None.  MEDIASTINUM: No cardiomegaly. Atherosclerosis.  MUSCULOSKELETAL STRUCTURES: No acute displaced fracture.  Impression: No acute cardiopulmonary abnormality.            Impressions:  This is a pleasant 72 year old female with a PMH of diverticulosis, GI bleeding due to diverticulosis who presented with syncope secondary hypotension from lower GI bleeding. She had gross bright red blood per rectum on arrival, no active bleeding. Hgb 12, plt 270, lactic acid 2.4, INR 1.07. BUN/Cr 35.5. CTA abdomen negative fo ractive arterial bleeding, diverticulosis noted but no diverticulitis, and a 4.5cm right posterior rectocele seen.     Recommendations:  LGI bleed: Differential includes diverticular, hemorrhoidal, AVMs, brisk upper GI bleed (unlikely based on hemodynamic stability)  - Maintain 2 large bore IVs  - Active type and screen  - Recommend fluid resusc w/ goal of normalizing heart rate  - Hgb goal >7 (>9 in setting of active chest pain), INR <2, plat >50 with active bleeding.  - Maintain NPO status  - plan for Colonoscopy        This note was generated using voice recognition software which has a small chance of producing errors of grammar and possibly content. I have made every reasonable attempt to find and correct any obvious errors, but expect that some may not be found prior to finalization of this note.

## 2022-11-01 NOTE — ASSESSMENT & PLAN NOTE
Moderate to advanced  On admission patient is alert to herself and age only, around her baseline  lives with her family  Continue nonpharmacological treatment to avoid delirium  Avoid benzo and antihistamine  Full code at this time.

## 2022-11-01 NOTE — ED NOTES
Pt report from Lelo Martinez RN.  Pt care assumed.  Pt oriented to name, .   Month = October.  Year= couldn't remember.  Per spouse, pt has slight Dementia. Cardiac & VS monitoring in progress:

## 2022-11-01 NOTE — ASSESSMENT & PLAN NOTE
Patient has history of diverticulitis bleeding last in 2019   came with painless GI bleeding and syncope  Last colonoscopy in 2016, no family history of colon cancer.  CTA for abdomen did not show any active bleeding  On PPI BID  Hemoglobin every 8 hours and blood transfusion if less than 7  GI consulted, possible diverticular bleed  No colonoscopy planned  If rebleeding, will consider RBC scan  NPO today and reeva tomorrow    11/4: emoglobin dropped again from 10 to 7 early this mornign received 1 unit of RBC and her hemoglobin went up to 8 but after patient started having copious amount of blood per rectum. Giving another 2 units orf RBC now.  GI was notified and has ordered a NM Gi-bleed scan and if a bleeding can been seen will discuss with IR for possible embolization otherwise if negative plan is for colonoscopy tomorrow. Cont on IVF.  Cont keeping her NPO     11/5: hemoglobin stable this morning going for colonoscopy today   GI following appreciate rec.     11/6: had colonoscopy done yesterday which showed multiple diverticular pouches through out the whole colon; both right and left side. Old blood was seen from A colon to Rectum. No active bleeding was seen  Appreciate GI rec

## 2022-11-01 NOTE — H&P
Hospital Medicine History & Physical Note    Date of Service  11/1/2022    Primary Care Physician  Wily Garcia M.D.    Consultants  GI    Code Status  Full Code    Chief Complaint  Chief Complaint   Patient presents with    Syncope       History of Presenting Illness    72-year-old female with history of dementia, hypothyroidism and GI bleeding who presented 11/1 with lower GI bleeding.  Patient was not able to provide any history due to her dementia.  During her lunch on the day of admission patient fainted and was unresponsive for seconds.  Her family noticed blood coming down from her rectum.  Patient denied any nausea or vomiting.  Denied any abdominal pain or other abnormalities complaints.  Patient has history of GI bleeding from her diverticulitis and last colonoscopy 2016 with no any masses.  On admission CTA for abdomen did not show any active bleeding.  GI was consulted by ED physician.    Patient has mild to moderate dementia, she is alert to herself only, around her baseline.  Patient lives with her family and her  at the caregiver.  Discussed the CODE STATUS with her  and he wants to keep her full code at this time.    I discussed the plan of care with patient, family, and bedside RN.    Review of Systems  Review of Systems   Unable to perform ROS: Dementia     Past Medical History   has a past medical history of Alzheimer's disease (HCC), Alzheimer's disease (HCC), Anesthesia, Arthritis, Backpain, Cancer (HCC) (2010), Hypothyroid, Other specified symptom associated with female genital organs, Pain, Renal disorder, Unspecified disorder of thyroid, and Unspecified urinary incontinence.    Surgical History   has a past surgical history that includes scleral buckling (7/9/08); rectus repair (10/9/08); hernia repair; inguinal hernia repair (12/18/2008); laparoscopy (5/7/2009); femoral hernia repair (5/7/2009); fusion, spine, lumbar, plif (2/27/2013); lumbar laminectomy diskectomy  (2/27/2013); gyn surgery; vaginectomy (7/25/2011); gyn surgery; gyn surgery; knee arthroplasty total (Right, 7/11/2016); gastroscopy-endo (8/11/2016); gastroscopy-endo (N/A, 7/26/2019); and colonoscopy - endo (N/A, 7/27/2019).     Family History  Denied any family history of colon cancer or GI bleeding.  Family history reviewed with patient. There is no family history that is pertinent to the chief complaint.     Social History   reports that she has never smoked. She has never used smokeless tobacco. She reports that she does not drink alcohol and does not use drugs.    Allergies  No Known Allergies    Medications  Prior to Admission Medications   Prescriptions Last Dose Informant Patient Reported? Taking?   B Complex Vitamins (VITAMIN B COMPLEX PO)  Patient Yes No   Sig: Take 1 Tab by mouth every evening.   CALCIUM-MAGNESIUM PO  Patient Yes No   Sig: Take 1 Tab by mouth every evening. 1000/500mg   Coenzyme Q10 (CO Q 10) 100 MG Cap  Patient Yes No   Sig: Take 200 mg by mouth every morning.   Melatonin 10 MG Tab  Patient Yes No   Sig: Take 10 mg by mouth every evening.   Vitamins A & D (VITAMIN A & D) 5000-400 UNITS Cap  Patient Yes No   Sig: Take 1 Tab by mouth every evening.   donepezil (ARICEPT) 10 MG tablet  Patient No No   Sig: Take 1 Tab by mouth every evening.   levothyroxine (SYNTHROID) 100 MCG Tab  Patient Yes No   Sig: Take 100 mcg by mouth Every morning on an empty stomach.   omeprazole (PRILOSEC) 20 MG delayed-release capsule  Patient Yes No   Sig: Take 20 mg by mouth every evening.   sucralfate (CARAFATE) 1 GM Tab  Patient Yes No   Sig: Take 1 g by mouth 3 times a day.   vitamin k 100 MCG tablet  Patient Yes No   Sig: Take 100 mcg by mouth every morning.      Facility-Administered Medications: None       Physical Exam  Temp:  [35.9 °C (96.6 °F)] 35.9 °C (96.6 °F)  Pulse:  [63-76] 76  Resp:  [18-20] 20  BP: (109)/(65) 109/65  SpO2:  [96 %] 96 %  Blood Pressure : 109/65   Temperature: 35.9 °C (96.6 °F)    Pulse: 63   Respiration: 18   Pulse Oximetry: 96 %       Physical Exam  Constitutional:       Appearance: She is not ill-appearing.   HENT:      Head: Normocephalic and atraumatic.   Eyes:      General: No scleral icterus.  Cardiovascular:      Rate and Rhythm: Tachycardia present.      Heart sounds: No murmur heard.  Pulmonary:      Effort: No respiratory distress.      Breath sounds: No wheezing or rales.   Abdominal:      General: There is no distension.      Tenderness: There is no abdominal tenderness. There is no guarding.   Musculoskeletal:         General: No swelling or deformity.      Right lower leg: Edema present.      Left lower leg: Edema present.   Skin:     Coloration: Skin is pale. Skin is not jaundiced.   Neurological:      General: No focal deficit present.      Mental Status: She is alert. Mental status is at baseline. She is disoriented.      Cranial Nerves: No cranial nerve deficit.      Motor: No weakness.       Laboratory:  Recent Labs     11/01/22  1255   WBC 11.3*   RBC 3.88*   HEMOGLOBIN 12.0   HEMATOCRIT 37.1   MCV 95.6   MCH 30.9   MCHC 32.3*   RDW 52.3*   PLATELETCT 270   MPV 10.3     Recent Labs     11/01/22  1255   SODIUM 141   POTASSIUM 3.8   CHLORIDE 111   CO2 18*   GLUCOSE 123*   BUN 21   CREATININE 0.59   CALCIUM 8.5     Recent Labs     11/01/22  1255   ALTSGPT 13   ASTSGOT 17   ALKPHOSPHAT 81   TBILIRUBIN 0.2   LIPASE 17   GLUCOSE 123*     Recent Labs     11/01/22  1255   APTT <20.0*   INR 1.07     No results for input(s): NTPROBNP in the last 72 hours.      No results for input(s): TROPONINT in the last 72 hours.    Imaging:  DX-CHEST-PORTABLE (1 VIEW)   Final Result      No acute cardiopulmonary abnormality.         CTA ABDOMEN PELVIS W & W/O POST PROCESS   Final Result      1.  No evidence of active arterial gastrointestinal bleeding.   2.  Colonic diverticulosis. No diverticulitis.   3.  A 4.5 cm right posterior rectocele.                X-Ray:  I have personally reviewed  the images and compared with prior images.  EKG:  I have personally reviewed the images and compared with prior images.    Assessment/Plan:  Justification for Admission Status  I anticipate this patient will require at least two midnights for appropriate medical management, necessitating inpatient admission because GI bleeding needs  2 night monitoring and possible blood transfusion and colonoscopy.    Patient will need a Med/Surg bed on MEDICAL service .  The need is secondary to Gi bleeding,  * GI bleeding- (present on admission)  Assessment & Plan  Patient has history of diverticulitis bleeding last in 2019   came with painless GI bleeding and syncope  Last colonoscopy in 2016, no family history of colon cancer.  CTA for abdomen did not show any active bleeding  Hemoglobin every 8 hours and blood transfusion if less than 7  Check iron panel  GI was consulted        Dizziness- (present on admission)  Assessment & Plan  Likely related to GI bleed   Monitor her hb       Dementia (HCC)- (present on admission)  Assessment & Plan  Moderate to advanced  On admission patient is alert to herself and age only, around her baseline  lives with her family  Continue nonpharmacological treatment to avoid delirium  Avoid benzo and antihistamine  Full code at this time.    Hypothyroid- (present on admission)  Assessment & Plan  Continue home dose of levothyroxine 100 mcg daily  Follow-up TSH with PCP        VTE prophylaxis: SCDs/TEDs and pharmacologic prophylaxis contraindicated due to GI bleeding      Interventions to be considered in all patients in order to minimize the risk of delirium.   -do not disturb patient (vitals or lab draws) between the hours of 10 PM and 6 AM.  -ideally the patient should not sleep during the day and we should avoid day time naps.   -up in chair for meals  -ambulate at least three times daily, as able  -watch for constipation  -timed voiding - ask patient is she would like to go to the bathroom q  2-3 hours, except during the do not disturb hours.   -remove all necessary lines (central lines, peripheral IVs, feeding tubes, wilkes catheters)  -unless patient has shown harm to self or others I would recommend against use of restraints - either chemical or physical (antipsychotics)   -minimize polypharmacy, do not dose medication during sleep hours

## 2022-11-02 ENCOUNTER — APPOINTMENT (OUTPATIENT)
Dept: RADIOLOGY | Facility: MEDICAL CENTER | Age: 72
DRG: 377 | End: 2022-11-02
Attending: INTERNAL MEDICINE
Payer: COMMERCIAL

## 2022-11-02 ENCOUNTER — ANESTHESIA (OUTPATIENT)
Dept: SURGERY | Facility: MEDICAL CENTER | Age: 72
DRG: 377 | End: 2022-11-02
Payer: COMMERCIAL

## 2022-11-02 ENCOUNTER — ANESTHESIA EVENT (OUTPATIENT)
Dept: SURGERY | Facility: MEDICAL CENTER | Age: 72
DRG: 377 | End: 2022-11-02
Payer: COMMERCIAL

## 2022-11-02 PROBLEM — K92.1 MELENA: Status: ACTIVE | Noted: 2022-11-02

## 2022-11-02 PROBLEM — R57.8 HEMORRHAGIC SHOCK (HCC): Status: ACTIVE | Noted: 2022-11-02

## 2022-11-02 PROBLEM — R55 VASOVAGAL SYNCOPE: Status: ACTIVE | Noted: 2022-11-02

## 2022-11-02 LAB
ANION GAP SERPL CALC-SCNC: 10 MMOL/L (ref 7–16)
ANION GAP SERPL CALC-SCNC: 13 MMOL/L (ref 7–16)
BASOPHILS # BLD AUTO: 0.5 % (ref 0–1.8)
BASOPHILS # BLD AUTO: 0.9 % (ref 0–1.8)
BASOPHILS # BLD: 0.1 K/UL (ref 0–0.12)
BASOPHILS # BLD: 0.11 K/UL (ref 0–0.12)
BUN SERPL-MCNC: 17 MG/DL (ref 8–22)
BUN SERPL-MCNC: 17 MG/DL (ref 8–22)
CA-I SERPL-SCNC: 1.1 MMOL/L (ref 1.1–1.3)
CALCIUM SERPL-MCNC: 7.4 MG/DL (ref 8.5–10.5)
CALCIUM SERPL-MCNC: 8.6 MG/DL (ref 8.5–10.5)
CFT BLD TEG: 4.4 MIN (ref 4.6–9.1)
CFT P HPASE BLD TEG: 4.9 MIN (ref 4.3–8.3)
CHLORIDE SERPL-SCNC: 110 MMOL/L (ref 96–112)
CHLORIDE SERPL-SCNC: 110 MMOL/L (ref 96–112)
CLOT ANGLE BLD TEG: 75.4 DEGREES (ref 63–78)
CLOT LYSIS 30M P MA LENFR BLD TEG: 0 % (ref 0–2.6)
CO2 SERPL-SCNC: 17 MMOL/L (ref 20–33)
CO2 SERPL-SCNC: 20 MMOL/L (ref 20–33)
CREAT SERPL-MCNC: 0.34 MG/DL (ref 0.5–1.4)
CREAT SERPL-MCNC: 0.44 MG/DL (ref 0.5–1.4)
CT.EXTRINSIC BLD ROTEM: 1 MIN (ref 0.8–2.1)
EOSINOPHIL # BLD AUTO: 0.23 K/UL (ref 0–0.51)
EOSINOPHIL # BLD AUTO: 0.43 K/UL (ref 0–0.51)
EOSINOPHIL NFR BLD: 1.1 % (ref 0–6.9)
EOSINOPHIL NFR BLD: 3.7 % (ref 0–6.9)
ERYTHROCYTE [DISTWIDTH] IN BLOOD BY AUTOMATED COUNT: 53.9 FL (ref 35.9–50)
ERYTHROCYTE [DISTWIDTH] IN BLOOD BY AUTOMATED COUNT: 54.2 FL (ref 35.9–50)
ERYTHROCYTE [DISTWIDTH] IN BLOOD BY AUTOMATED COUNT: 54.6 FL (ref 35.9–50)
GFR SERPLBLD CREATININE-BSD FMLA CKD-EPI: 103 ML/MIN/1.73 M 2
GFR SERPLBLD CREATININE-BSD FMLA CKD-EPI: 109 ML/MIN/1.73 M 2
GLUCOSE SERPL-MCNC: 148 MG/DL (ref 65–99)
GLUCOSE SERPL-MCNC: 91 MG/DL (ref 65–99)
HCT VFR BLD AUTO: 22.7 % (ref 37–47)
HCT VFR BLD AUTO: 31.7 % (ref 37–47)
HCT VFR BLD AUTO: 31.7 % (ref 37–47)
HCT VFR BLD AUTO: 34.3 % (ref 37–47)
HGB BLD-MCNC: 10.6 G/DL (ref 12–16)
HGB BLD-MCNC: 10.9 G/DL (ref 12–16)
HGB BLD-MCNC: 7.1 G/DL (ref 12–16)
HGB BLD-MCNC: 9.9 G/DL (ref 12–16)
IMM GRANULOCYTES # BLD AUTO: 0.04 K/UL (ref 0–0.11)
IMM GRANULOCYTES # BLD AUTO: 0.17 K/UL (ref 0–0.11)
IMM GRANULOCYTES NFR BLD AUTO: 0.3 % (ref 0–0.9)
IMM GRANULOCYTES NFR BLD AUTO: 0.8 % (ref 0–0.9)
LACTATE SERPL-SCNC: 1 MMOL/L (ref 0.5–2)
LYMPHOCYTES # BLD AUTO: 2.11 K/UL (ref 1–4.8)
LYMPHOCYTES # BLD AUTO: 2.41 K/UL (ref 1–4.8)
LYMPHOCYTES NFR BLD: 20.8 % (ref 22–41)
LYMPHOCYTES NFR BLD: 9.9 % (ref 22–41)
MAGNESIUM SERPL-MCNC: 2.1 MG/DL (ref 1.5–2.5)
MCF BLD TEG: 64.7 MM (ref 52–69)
MCF.PLATELET INHIB BLD ROTEM: 24.2 MM (ref 15–32)
MCH RBC QN AUTO: 30.4 PG (ref 27–33)
MCH RBC QN AUTO: 30.7 PG (ref 27–33)
MCH RBC QN AUTO: 31.1 PG (ref 27–33)
MCHC RBC AUTO-ENTMCNC: 31.2 G/DL (ref 33.6–35)
MCHC RBC AUTO-ENTMCNC: 31.8 G/DL (ref 33.6–35)
MCHC RBC AUTO-ENTMCNC: 33.4 G/DL (ref 33.6–35)
MCV RBC AUTO: 90.8 FL (ref 81.4–97.8)
MCV RBC AUTO: 97.7 FL (ref 81.4–97.8)
MCV RBC AUTO: 98.4 FL (ref 81.4–97.8)
MONOCYTES # BLD AUTO: 0.86 K/UL (ref 0–0.85)
MONOCYTES # BLD AUTO: 1.16 K/UL (ref 0–0.85)
MONOCYTES NFR BLD AUTO: 5.4 % (ref 0–13.4)
MONOCYTES NFR BLD AUTO: 7.4 % (ref 0–13.4)
NEUTROPHILS # BLD AUTO: 17.6 K/UL (ref 2–7.15)
NEUTROPHILS # BLD AUTO: 7.73 K/UL (ref 2–7.15)
NEUTROPHILS NFR BLD: 66.9 % (ref 44–72)
NEUTROPHILS NFR BLD: 82.3 % (ref 44–72)
NRBC # BLD AUTO: 0 K/UL
NRBC # BLD AUTO: 0 K/UL
NRBC BLD-RTO: 0 /100 WBC
NRBC BLD-RTO: 0 /100 WBC
PA AA BLD-ACNC: 25.6 % (ref 0–11)
PA ADP BLD-ACNC: 0 % (ref 0–17)
PLATELET # BLD AUTO: 207 K/UL (ref 164–446)
PLATELET # BLD AUTO: 308 K/UL (ref 164–446)
PLATELET # BLD AUTO: 344 K/UL (ref 164–446)
PMV BLD AUTO: 10.4 FL (ref 9–12.9)
PMV BLD AUTO: 10.7 FL (ref 9–12.9)
PMV BLD AUTO: 10.9 FL (ref 9–12.9)
POTASSIUM SERPL-SCNC: 4.2 MMOL/L (ref 3.6–5.5)
POTASSIUM SERPL-SCNC: 4.2 MMOL/L (ref 3.6–5.5)
RBC # BLD AUTO: 3.22 M/UL (ref 4.2–5.4)
RBC # BLD AUTO: 3.49 M/UL (ref 4.2–5.4)
RBC # BLD AUTO: 3.51 M/UL (ref 4.2–5.4)
SODIUM SERPL-SCNC: 140 MMOL/L (ref 135–145)
SODIUM SERPL-SCNC: 140 MMOL/L (ref 135–145)
TEG ALGORITHM TGALG: ABNORMAL
TSH SERPL DL<=0.005 MIU/L-ACNC: 0.41 UIU/ML (ref 0.38–5.33)
WBC # BLD AUTO: 11.6 K/UL (ref 4.8–10.8)
WBC # BLD AUTO: 17.8 K/UL (ref 4.8–10.8)
WBC # BLD AUTO: 21.4 K/UL (ref 4.8–10.8)

## 2022-11-02 PROCEDURE — 700102 HCHG RX REV CODE 250 W/ 637 OVERRIDE(OP): Performed by: INTERNAL MEDICINE

## 2022-11-02 PROCEDURE — 700105 HCHG RX REV CODE 258: Performed by: INTERNAL MEDICINE

## 2022-11-02 PROCEDURE — 82330 ASSAY OF CALCIUM: CPT

## 2022-11-02 PROCEDURE — 85025 COMPLETE CBC W/AUTO DIFF WBC: CPT

## 2022-11-02 PROCEDURE — C9113 INJ PANTOPRAZOLE SODIUM, VIA: HCPCS | Performed by: INTERNAL MEDICINE

## 2022-11-02 PROCEDURE — 85576 BLOOD PLATELET AGGREGATION: CPT | Mod: 91

## 2022-11-02 PROCEDURE — 36430 TRANSFUSION BLD/BLD COMPNT: CPT

## 2022-11-02 PROCEDURE — 30233N1 TRANSFUSION OF NONAUTOLOGOUS RED BLOOD CELLS INTO PERIPHERAL VEIN, PERCUTANEOUS APPROACH: ICD-10-PCS | Performed by: INTERNAL MEDICINE

## 2022-11-02 PROCEDURE — 71045 X-RAY EXAM CHEST 1 VIEW: CPT

## 2022-11-02 PROCEDURE — 99291 CRITICAL CARE FIRST HOUR: CPT | Performed by: INTERNAL MEDICINE

## 2022-11-02 PROCEDURE — 85384 FIBRINOGEN ACTIVITY: CPT

## 2022-11-02 PROCEDURE — P9016 RBC LEUKOCYTES REDUCED: HCPCS | Mod: 91

## 2022-11-02 PROCEDURE — 85027 COMPLETE CBC AUTOMATED: CPT

## 2022-11-02 PROCEDURE — 700111 HCHG RX REV CODE 636 W/ 250 OVERRIDE (IP): Performed by: INTERNAL MEDICINE

## 2022-11-02 PROCEDURE — 85018 HEMOGLOBIN: CPT

## 2022-11-02 PROCEDURE — 83735 ASSAY OF MAGNESIUM: CPT

## 2022-11-02 PROCEDURE — 84132 ASSAY OF SERUM POTASSIUM: CPT

## 2022-11-02 PROCEDURE — 86923 COMPATIBILITY TEST ELECTRIC: CPT | Mod: 91

## 2022-11-02 PROCEDURE — 700105 HCHG RX REV CODE 258: Performed by: NURSE PRACTITIONER

## 2022-11-02 PROCEDURE — 85347 COAGULATION TIME ACTIVATED: CPT

## 2022-11-02 PROCEDURE — 85014 HEMATOCRIT: CPT

## 2022-11-02 PROCEDURE — 700111 HCHG RX REV CODE 636 W/ 250 OVERRIDE (IP): Performed by: NURSE PRACTITIONER

## 2022-11-02 PROCEDURE — 83605 ASSAY OF LACTIC ACID: CPT

## 2022-11-02 PROCEDURE — A9270 NON-COVERED ITEM OR SERVICE: HCPCS | Performed by: INTERNAL MEDICINE

## 2022-11-02 PROCEDURE — 700105 HCHG RX REV CODE 258: Performed by: HOSPITALIST

## 2022-11-02 PROCEDURE — 84295 ASSAY OF SERUM SODIUM: CPT

## 2022-11-02 PROCEDURE — 84443 ASSAY THYROID STIM HORMONE: CPT

## 2022-11-02 PROCEDURE — 770022 HCHG ROOM/CARE - ICU (200)

## 2022-11-02 PROCEDURE — 82803 BLOOD GASES ANY COMBINATION: CPT

## 2022-11-02 PROCEDURE — 80048 BASIC METABOLIC PNL TOTAL CA: CPT

## 2022-11-02 PROCEDURE — 99233 SBSQ HOSP IP/OBS HIGH 50: CPT | Mod: MISDOCU | Performed by: NURSE PRACTITIONER

## 2022-11-02 PROCEDURE — 700105 HCHG RX REV CODE 258: Performed by: ANESTHESIOLOGY

## 2022-11-02 PROCEDURE — 99233 SBSQ HOSP IP/OBS HIGH 50: CPT | Performed by: HOSPITALIST

## 2022-11-02 RX ORDER — ONDANSETRON 2 MG/ML
4 INJECTION INTRAMUSCULAR; INTRAVENOUS EVERY 6 HOURS PRN
Status: DISCONTINUED | OUTPATIENT
Start: 2022-11-02 | End: 2022-11-07 | Stop reason: HOSPADM

## 2022-11-02 RX ORDER — SODIUM CHLORIDE, SODIUM LACTATE, POTASSIUM CHLORIDE, AND CALCIUM CHLORIDE .6; .31; .03; .02 G/100ML; G/100ML; G/100ML; G/100ML
500 INJECTION, SOLUTION INTRAVENOUS ONCE
Status: COMPLETED | OUTPATIENT
Start: 2022-11-02 | End: 2022-11-02

## 2022-11-02 RX ORDER — SODIUM CHLORIDE, SODIUM LACTATE, POTASSIUM CHLORIDE, CALCIUM CHLORIDE 600; 310; 30; 20 MG/100ML; MG/100ML; MG/100ML; MG/100ML
INJECTION, SOLUTION INTRAVENOUS CONTINUOUS
Status: ACTIVE | OUTPATIENT
Start: 2022-11-02 | End: 2022-11-02

## 2022-11-02 RX ORDER — LIDOCAINE HYDROCHLORIDE 10 MG/ML
INJECTION, SOLUTION EPIDURAL; INFILTRATION; INTRACAUDAL; PERINEURAL
Status: DISPENSED
Start: 2022-11-02 | End: 2022-11-03

## 2022-11-02 RX ORDER — SODIUM CHLORIDE 9 MG/ML
500 INJECTION, SOLUTION INTRAVENOUS ONCE
Status: COMPLETED | OUTPATIENT
Start: 2022-11-02 | End: 2022-11-03

## 2022-11-02 RX ORDER — SODIUM CHLORIDE 9 MG/ML
INJECTION, SOLUTION INTRAVENOUS CONTINUOUS
Status: ACTIVE | OUTPATIENT
Start: 2022-11-02 | End: 2022-11-03

## 2022-11-02 RX ORDER — SODIUM CHLORIDE, SODIUM LACTATE, POTASSIUM CHLORIDE, CALCIUM CHLORIDE 600; 310; 30; 20 MG/100ML; MG/100ML; MG/100ML; MG/100ML
INJECTION, SOLUTION INTRAVENOUS CONTINUOUS
Status: DISCONTINUED | OUTPATIENT
Start: 2022-11-02 | End: 2022-11-02

## 2022-11-02 RX ORDER — SODIUM CHLORIDE, SODIUM LACTATE, POTASSIUM CHLORIDE, AND CALCIUM CHLORIDE .6; .31; .03; .02 G/100ML; G/100ML; G/100ML; G/100ML
500 INJECTION, SOLUTION INTRAVENOUS ONCE
Status: COMPLETED | OUTPATIENT
Start: 2022-11-02 | End: 2022-11-03

## 2022-11-02 RX ORDER — SODIUM CHLORIDE, SODIUM LACTATE, POTASSIUM CHLORIDE, CALCIUM CHLORIDE 600; 310; 30; 20 MG/100ML; MG/100ML; MG/100ML; MG/100ML
INJECTION, SOLUTION INTRAVENOUS CONTINUOUS
Status: DISCONTINUED | OUTPATIENT
Start: 2022-11-02 | End: 2022-11-05

## 2022-11-02 RX ORDER — PANTOPRAZOLE SODIUM 40 MG/10ML
40 INJECTION, POWDER, LYOPHILIZED, FOR SOLUTION INTRAVENOUS 2 TIMES DAILY
Status: DISCONTINUED | OUTPATIENT
Start: 2022-11-02 | End: 2022-11-06

## 2022-11-02 RX ORDER — CALCIUM GLUCONATE 20 MG/ML
1 INJECTION, SOLUTION INTRAVENOUS ONCE
Status: COMPLETED | OUTPATIENT
Start: 2022-11-02 | End: 2022-11-03

## 2022-11-02 RX ORDER — PEG-3350, SODIUM SULFATE, SODIUM CHLORIDE, POTASSIUM CHLORIDE, SODIUM ASCORBATE AND ASCORBIC ACID 7.5-2.691G
100 KIT ORAL 2 TIMES DAILY
Status: DISPENSED | OUTPATIENT
Start: 2022-11-02 | End: 2022-11-03

## 2022-11-02 RX ADMIN — SODIUM CHLORIDE: 9 INJECTION, SOLUTION INTRAVENOUS at 18:15

## 2022-11-02 RX ADMIN — ONDANSETRON 4 MG: 2 INJECTION INTRAMUSCULAR; INTRAVENOUS at 19:01

## 2022-11-02 RX ADMIN — PANTOPRAZOLE SODIUM 40 MG: 40 INJECTION, POWDER, FOR SOLUTION INTRAVENOUS at 18:19

## 2022-11-02 RX ADMIN — SENNOSIDES AND DOCUSATE SODIUM 2 TABLET: 50; 8.6 TABLET ORAL at 05:19

## 2022-11-02 RX ADMIN — PANTOPRAZOLE SODIUM 40 MG: 40 INJECTION, POWDER, FOR SOLUTION INTRAVENOUS at 05:20

## 2022-11-02 RX ADMIN — PEG-3350, SODIUM SULFATE, SODIUM CHLORIDE, POTASSIUM CHLORIDE, SODIUM ASCORBATE AND ASCORBIC ACID 100 G: KIT at 09:49

## 2022-11-02 RX ADMIN — SODIUM CHLORIDE, POTASSIUM CHLORIDE, SODIUM LACTATE AND CALCIUM CHLORIDE: 600; 310; 30; 20 INJECTION, SOLUTION INTRAVENOUS at 17:00

## 2022-11-02 RX ADMIN — SODIUM CHLORIDE, POTASSIUM CHLORIDE, SODIUM LACTATE AND CALCIUM CHLORIDE 500 ML: 600; 310; 30; 20 INJECTION, SOLUTION INTRAVENOUS at 23:25

## 2022-11-02 RX ADMIN — SODIUM CHLORIDE, POTASSIUM CHLORIDE, SODIUM LACTATE AND CALCIUM CHLORIDE: 600; 310; 30; 20 INJECTION, SOLUTION INTRAVENOUS at 09:50

## 2022-11-02 RX ADMIN — SODIUM CHLORIDE, POTASSIUM CHLORIDE, SODIUM LACTATE AND CALCIUM CHLORIDE 500 ML: 600; 310; 30; 20 INJECTION, SOLUTION INTRAVENOUS at 13:00

## 2022-11-02 RX ADMIN — LEVOTHYROXINE SODIUM 88 MCG: 0.09 TABLET ORAL at 05:19

## 2022-11-02 RX ADMIN — SODIUM CHLORIDE, POTASSIUM CHLORIDE, SODIUM LACTATE AND CALCIUM CHLORIDE: 600; 310; 30; 20 INJECTION, SOLUTION INTRAVENOUS at 19:07

## 2022-11-02 RX ADMIN — SODIUM CHLORIDE, POTASSIUM CHLORIDE, SODIUM LACTATE AND CALCIUM CHLORIDE: 600; 310; 30; 20 INJECTION, SOLUTION INTRAVENOUS at 15:50

## 2022-11-02 RX ADMIN — SODIUM CHLORIDE 500 ML: 9 INJECTION, SOLUTION INTRAVENOUS at 22:53

## 2022-11-02 RX ADMIN — CALCIUM GLUCONATE 1 G: 20 INJECTION, SOLUTION INTRAVENOUS at 23:56

## 2022-11-02 ASSESSMENT — COGNITIVE AND FUNCTIONAL STATUS - GENERAL
CLIMB 3 TO 5 STEPS WITH RAILING: A LOT
MOVING FROM LYING ON BACK TO SITTING ON SIDE OF FLAT BED: A LOT
TOILETING: A LOT
MOVING TO AND FROM BED TO CHAIR: A LOT
SUGGESTED CMS G CODE MODIFIER DAILY ACTIVITY: CK
DRESSING REGULAR UPPER BODY CLOTHING: A LITTLE
MOBILITY SCORE: 14
WALKING IN HOSPITAL ROOM: A LOT
PERSONAL GROOMING: A LITTLE
EATING MEALS: A LITTLE
DAILY ACTIVITIY SCORE: 15
HELP NEEDED FOR BATHING: A LOT
SUGGESTED CMS G CODE MODIFIER MOBILITY: CL
DRESSING REGULAR LOWER BODY CLOTHING: A LOT
STANDING UP FROM CHAIR USING ARMS: A LOT

## 2022-11-02 ASSESSMENT — FIBROSIS 4 INDEX: FIB4 SCORE: 0.99

## 2022-11-02 ASSESSMENT — ENCOUNTER SYMPTOMS
PSYCHIATRIC NEGATIVE: 1
CHILLS: 0
VOMITING: 0
NAUSEA: 0
ABDOMINAL PAIN: 0
CARDIOVASCULAR NEGATIVE: 1
WEAKNESS: 1
EYES NEGATIVE: 1
RESPIRATORY NEGATIVE: 1
BLOOD IN STOOL: 1
FEVER: 0

## 2022-11-02 ASSESSMENT — PAIN DESCRIPTION - PAIN TYPE: TYPE: ACUTE PAIN

## 2022-11-02 NOTE — PROGRESS NOTES
Gastroenterology Progress Note               Author:  MADONNA Adler Date & Time Created: 11/2/2022 11:21 AM       Patient ID:  Name:             Jayshree Reinoso  YOB: 1950  Age:                 72 y.o.  female  MRN:               [id number]        Medical Decision Making, by Problem:  Active Hospital Problems    Diagnosis     Melena [K92.1]     GI bleeding [K92.2]     Dizziness [R42]     Dementia (HCC) [F03.90]     Hypothyroid [E03.9]            Presenting Chief Complaint:  Lower GI bleeding     History of Present Illness: This is a 72 year old female with a past medical [id number] of dementia, hypothyroidism, diverticulosis, prior GI bleeding secondary to diverticulosis admitted 11/1/2022 for lower GI bleeding. Patient was very limited in providing history due to her dementia but her , Per, was at bedside to provide history. He states that the patient was in her normal state of health until prior to arrival. He states they were out at lunch and patient slumped over, he rushed to support her so she didn't fall, and patient was unresponsive for 30-60 seconds. When she awoke, she was complaining of abdominal pain and guarding her abdomen. EMS was called and stated that patient was hypotensive. Upon arrival to the emergency department, patient was noted to have gross amount of blood from rectum going down her legs. Patient reports that she had blood stool this morning only. She, furthermore, reports chronic headache but denies nausea, vomiting, dizziness, headache, shortness of breath, or weakness. She is normotensive. Denies pain. Hgb 12, plt 270, lactic acid 2.4, INR 1.07. BUN/Cr 35.5. CTA abdomen negative fo ractive arterial bleeding, diverticulosis noted but no diverticulitis, and a 4.5cm right posterior rectocele seen.   Of note, patient was admitted in 2016 and 2019 for acute blood loss, melena, and hematochezia due to GI bleeding. She had EGD and colonoscopy with GI consultants  provider in 2016 and had repeat colonoscopy in 2019. No active bleeding was seen at that time. Patient did not follow up with GI outpatient since.   Denies current or previous tobacco use, alcohol use or recreational drug use.       Interval History:  11/2/2022: Patient seen at bedside with . Hgb stable. Patient/nursing reports one BM last night with BRBPR. Patient did not complete prep for colonoscopy last night/this morning. Discussed with nursing to do prep today, complete by 1400 and pending patient completion of prep, will plan for colonoscopy today. If unable to complete, will plan for colonoscopy tomorrow.  verbalized understanding, agreeable, and will encourage patient to drink prep.         Hospital Medications:  Current Facility-Administered Medications   Medication Dose Frequency Provider Last Rate Last Admin    peg-electrolyte solution (MOVIPREP) package 100 g  100 g BID Cadence Wang M.D.   100 g at 11/02/22 0949    levothyroxine (SYNTHROID) tablet 88 mcg  88 mcg AM ES Edith Mae M.D.   88 mcg at 11/02/22 0519    melatonin tablet 10 mg  10 mg QHS Edith Mae M.D.   10 mg at 11/01/22 2100    senna-docusate (PERICOLACE or SENOKOT S) 8.6-50 MG per tablet 2 Tablet  2 Tablet BID Edith Mae M.D.   2 Tablet at 11/02/22 0519    And    polyethylene glycol/lytes (MIRALAX) PACKET 1 Packet  1 Packet QDAY PRN Edith Mae M.D.        And    magnesium hydroxide (MILK OF MAGNESIA) suspension 30 mL  30 mL QDAY PRN Edith Mae M.D.        And    bisacodyl (DULCOLAX) suppository 10 mg  10 mg QDAY PRN Edith Mae M.D.        NS infusion   Continuous Edith Mae M.D. 75 mL/hr at 11/01/22 1500 New Bag at 11/01/22 1500    acetaminophen (Tylenol) tablet 650 mg  650 mg Q6HRS PRN Edith Mae M.D.        pantoprazole (Protonix) injection 40 mg  40 mg DAILY Edith Mae M.D.   40 mg at 11/02/22 0520   Last reviewed on 11/1/2022  4:50 PM by Tracy Winston,  R.N.       Vital signs:  Weight/BMI: Body mass index is 25.83 kg/m².  /74   Pulse 77   Temp 36.1 °C (97 °F) (Temporal)   Resp 18   Wt 64 kg (141 lb 3.2 oz)   SpO2 96%   Vitals:    11/01/22 1646 11/01/22 2003 11/02/22 0447 11/02/22 0730   BP: 112/68 135/78 111/73 116/74   Pulse: 88 74 87 77   Resp:  19 19 18   Temp: 36.8 °C (98.2 °F) 36.3 °C (97.4 °F) 36.5 °C (97.7 °F) 36.1 °C (97 °F)   TempSrc: Temporal Temporal Temporal Temporal   SpO2: 96% 94% 100% 96%   Weight: 64 kg (141 lb 3.2 oz)        Oxygen Therapy:  Pulse Oximetry: 96 %, O2 (LPM): 1, O2 Delivery Device: Nasal Cannula  No intake or output data in the 24 hours ending 11/02/22 1121    Review of Systems   Unable to perform ROS: Dementia   Gastrointestinal:  Positive for blood in stool. Negative for abdominal pain, nausea and vomiting.   All other systems reviewed and are negative.        Physical Exam:  Physical Exam  Vitals and nursing note reviewed.   Constitutional:       Appearance: Normal appearance. She is normal weight.   HENT:      Head: Normocephalic.      Nose: Nose normal.      Mouth/Throat:      Mouth: Mucous membranes are moist.      Pharynx: Oropharynx is clear.   Eyes:      General: No scleral icterus.     Extraocular Movements: Extraocular movements intact.      Conjunctiva/sclera: Conjunctivae normal.      Pupils: Pupils are equal, round, and reactive to light.   Cardiovascular:      Rate and Rhythm: Normal rate and regular rhythm.      Pulses: Normal pulses.      Heart sounds: Normal heart sounds. No murmur heard.    No gallop.   Pulmonary:      Effort: Pulmonary effort is normal. No respiratory distress.      Breath sounds: Normal breath sounds. No wheezing.   Chest:      Chest wall: No tenderness.   Abdominal:      General: Abdomen is flat. Bowel sounds are normal. There is no distension.      Palpations: Abdomen is soft.      Tenderness: There is no abdominal tenderness. There is no guarding.   Musculoskeletal:      Right lower  leg: Edema present.      Left lower leg: Edema present.   Skin:     General: Skin is warm and dry.      Capillary Refill: Capillary refill takes less than 2 seconds.      Coloration: Skin is pale. Skin is not jaundiced.   Neurological:      Mental Status: She is alert. Mental status is at baseline. She is disoriented.   Psychiatric:         Mood and Affect: Mood normal.         Behavior: Behavior normal.           Labs:  Recent Labs     11/01/22  1255 11/02/22  1031   SODIUM 141 140   POTASSIUM 3.8 4.2   CHLORIDE 111 110   CO2 18* 20   BUN 21 17   CREATININE 0.59 0.44*   MAGNESIUM  --  2.1   CALCIUM 8.5 8.6     Recent Labs     11/01/22  1255 11/02/22  1031   ALTSGPT 13  --    ASTSGOT 17  --    ALKPHOSPHAT 81  --    TBILIRUBIN 0.2  --    LIPASE 17  --    GLUCOSE 123* 91     Recent Labs     11/01/22  1255 11/02/22  1031   WBC 11.3* 11.6*   NEUTSPOLYS 71.70 66.90   LYMPHOCYTES 19.00* 20.80*   MONOCYTES 5.80 7.40   EOSINOPHILS 2.40 3.70   BASOPHILS 0.70 0.90   ASTSGOT 17  --    ALTSGPT 13  --    ALKPHOSPHAT 81  --    TBILIRUBIN 0.2  --      Recent Labs     11/01/22  1255 11/01/22  2308 11/02/22  1031   RBC 3.88*  --  3.51*   HEMOGLOBIN 12.0 10.5* 10.9*   HEMATOCRIT 37.1  --  34.3*   PLATELETCT 270  --  308   PROTHROMBTM 13.8  --   --    APTT <20.0*  --   --    INR 1.07  --   --    IRON 58  --   --    FERRITIN 36.3  --   --    TOTIRONBC 277  --   --      Recent Results (from the past 24 hour(s))   CBC WITH DIFFERENTIAL    Collection Time: 11/01/22 12:55 PM   Result Value Ref Range    WBC 11.3 (H) 4.8 - 10.8 K/uL    RBC 3.88 (L) 4.20 - 5.40 M/uL    Hemoglobin 12.0 12.0 - 16.0 g/dL    Hematocrit 37.1 37.0 - 47.0 %    MCV 95.6 81.4 - 97.8 fL    MCH 30.9 27.0 - 33.0 pg    MCHC 32.3 (L) 33.6 - 35.0 g/dL    RDW 52.3 (H) 35.9 - 50.0 fL    Platelet Count 270 164 - 446 K/uL    MPV 10.3 9.0 - 12.9 fL    Neutrophils-Polys 71.70 44.00 - 72.00 %    Lymphocytes 19.00 (L) 22.00 - 41.00 %    Monocytes 5.80 0.00 - 13.40 %    Eosinophils  2.40 0.00 - 6.90 %    Basophils 0.70 0.00 - 1.80 %    Immature Granulocytes 0.40 0.00 - 0.90 %    Nucleated RBC 0.00 /100 WBC    Neutrophils (Absolute) 8.09 (H) 2.00 - 7.15 K/uL    Lymphs (Absolute) 2.14 1.00 - 4.80 K/uL    Monos (Absolute) 0.65 0.00 - 0.85 K/uL    Eos (Absolute) 0.27 0.00 - 0.51 K/uL    Baso (Absolute) 0.08 0.00 - 0.12 K/uL    Immature Granulocytes (abs) 0.04 0.00 - 0.11 K/uL    NRBC (Absolute) 0.00 K/uL   COMP METABOLIC PANEL    Collection Time: 11/01/22 12:55 PM   Result Value Ref Range    Sodium 141 135 - 145 mmol/L    Potassium 3.8 3.6 - 5.5 mmol/L    Chloride 111 96 - 112 mmol/L    Co2 18 (L) 20 - 33 mmol/L    Anion Gap 12.0 7.0 - 16.0    Glucose 123 (H) 65 - 99 mg/dL    Bun 21 8 - 22 mg/dL    Creatinine 0.59 0.50 - 1.40 mg/dL    Calcium 8.5 8.5 - 10.5 mg/dL    AST(SGOT) 17 12 - 45 U/L    ALT(SGPT) 13 2 - 50 U/L    Alkaline Phosphatase 81 30 - 99 U/L    Total Bilirubin 0.2 0.1 - 1.5 mg/dL    Albumin 3.7 3.2 - 4.9 g/dL    Total Protein 6.6 6.0 - 8.2 g/dL    Globulin 2.9 1.9 - 3.5 g/dL    A-G Ratio 1.3 g/dL   LIPASE    Collection Time: 11/01/22 12:55 PM   Result Value Ref Range    Lipase 17 11 - 82 U/L   PROTHROMBIN TIME    Collection Time: 11/01/22 12:55 PM   Result Value Ref Range    PT 13.8 12.0 - 14.6 sec    INR 1.07 0.87 - 1.13   APTT    Collection Time: 11/01/22 12:55 PM   Result Value Ref Range    APTT <20.0 (L) 24.7 - 36.0 sec   ESTIMATED GFR    Collection Time: 11/01/22 12:55 PM   Result Value Ref Range    GFR (CKD-EPI) 96 >60 mL/min/1.73 m 2   FERRITIN    Collection Time: 11/01/22 12:55 PM   Result Value Ref Range    Ferritin 36.3 10.0 - 291.0 ng/mL   IRON/TOTAL IRON BIND    Collection Time: 11/01/22 12:55 PM   Result Value Ref Range    Iron 58 40 - 170 ug/dL    Total Iron Binding 277 250 - 450 ug/dL    Unsat Iron Binding 219 110 - 370 ug/dL    % Saturation 21 15 - 55 %   COD (ADULT)    Collection Time: 11/01/22  1:00 PM   Result Value Ref Range    ABO Grouping Only O     Rh Grouping  Only POS     Antibody Screen-Cod NEG    LACTIC ACID    Collection Time: 11/01/22  3:23 PM   Result Value Ref Range    Lactic Acid 2.4 (H) 0.5 - 2.0 mmol/L   HGB (Hemoglobin) for 48 hours    Collection Time: 11/01/22 11:08 PM   Result Value Ref Range    Hemoglobin 10.5 (L) 12.0 - 16.0 g/dL   CBC WITH DIFFERENTIAL    Collection Time: 11/02/22 10:31 AM   Result Value Ref Range    WBC 11.6 (H) 4.8 - 10.8 K/uL    RBC 3.51 (L) 4.20 - 5.40 M/uL    Hemoglobin 10.9 (L) 12.0 - 16.0 g/dL    Hematocrit 34.3 (L) 37.0 - 47.0 %    MCV 97.7 81.4 - 97.8 fL    MCH 31.1 27.0 - 33.0 pg    MCHC 31.8 (L) 33.6 - 35.0 g/dL    RDW 53.9 (H) 35.9 - 50.0 fL    Platelet Count 308 164 - 446 K/uL    MPV 10.4 9.0 - 12.9 fL    Neutrophils-Polys 66.90 44.00 - 72.00 %    Lymphocytes 20.80 (L) 22.00 - 41.00 %    Monocytes 7.40 0.00 - 13.40 %    Eosinophils 3.70 0.00 - 6.90 %    Basophils 0.90 0.00 - 1.80 %    Immature Granulocytes 0.30 0.00 - 0.90 %    Nucleated RBC 0.00 /100 WBC    Neutrophils (Absolute) 7.73 (H) 2.00 - 7.15 K/uL    Lymphs (Absolute) 2.41 1.00 - 4.80 K/uL    Monos (Absolute) 0.86 (H) 0.00 - 0.85 K/uL    Eos (Absolute) 0.43 0.00 - 0.51 K/uL    Baso (Absolute) 0.10 0.00 - 0.12 K/uL    Immature Granulocytes (abs) 0.04 0.00 - 0.11 K/uL    NRBC (Absolute) 0.00 K/uL   MAGNESIUM    Collection Time: 11/02/22 10:31 AM   Result Value Ref Range    Magnesium 2.1 1.5 - 2.5 mg/dL   Basic Metabolic Panel    Collection Time: 11/02/22 10:31 AM   Result Value Ref Range    Sodium 140 135 - 145 mmol/L    Potassium 4.2 3.6 - 5.5 mmol/L    Chloride 110 96 - 112 mmol/L    Co2 20 20 - 33 mmol/L    Glucose 91 65 - 99 mg/dL    Bun 17 8 - 22 mg/dL    Creatinine 0.44 (L) 0.50 - 1.40 mg/dL    Calcium 8.6 8.5 - 10.5 mg/dL    Anion Gap 10.0 7.0 - 16.0   LACTIC ACID    Collection Time: 11/02/22 10:31 AM   Result Value Ref Range    Lactic Acid 1.0 0.5 - 2.0 mmol/L   ESTIMATED GFR    Collection Time: 11/02/22 10:31 AM   Result Value Ref Range    GFR (CKD-EPI) 103 >60  mL/min/1.73 m 2       Radiology Review:  DX-CHEST-PORTABLE (1 VIEW)   Final Result      No acute cardiopulmonary abnormality.         CTA ABDOMEN PELVIS W & W/O POST PROCESS   Final Result      1.  No evidence of active arterial gastrointestinal bleeding.   2.  Colonic diverticulosis. No diverticulitis.   3.  A 4.5 cm right posterior rectocele.                  MDM (Data Review):   -Records reviewed and summarized in current documentation  -I personally reviewed and interpreted the laboratory results  -I personally reviewed the radiology images    Assessment/Recommendations:  Patient did not complete prep for colonoscopy last night/this morning. Discussed with nursing to do prep today, complete by 1400 and pending patient completion of prep, will plan for colonoscopy today.   If unable to complete, will plan for colonoscopy tomorrow.     LGI bleed: Differential includes diverticular, hemorrhoidal, AVMs, brisk upper GI bleed (unlikely based on hemodynamic stability)  - Maintain 2 large bore IVs  - Active type and screen  - Recommend fluid resusc w/ goal of normalizing heart rate  - Monitor H/H. Hgb goal >7 (>9 in setting of active chest pain), INR <2, plat >50 with active bleeding.  - Maintain NPO status  - plan for Colonoscopy tentatively this afternoon, if not tomorrow (see above).       Core Quality Measures   Reviewed items::  Labs, Medications and Radiology reports reviewed

## 2022-11-02 NOTE — HOSPITAL COURSE
72-year-old female with history of dementia, hypothyroidism and GI bleeding who presented 11/1 with lower GI bleeding.  Patient was not able to provide any history due to her dementia.  During her lunch on the day of admission patient fainted and was unresponsive for seconds.  Her family noticed blood coming down from her rectum.  Patient denied any nausea or vomiting.  Denied any abdominal pain or other abnormalities complaints.  Patient has history of GI bleeding from her diverticulitis and last colonoscopy 2016 with no any masses.  On admission CTA for abdomen did not show any active bleeding.  GI was consulted by ED physician.     Patient has mild to moderate dementia, she is alert to herself only, around her baseline

## 2022-11-02 NOTE — ANESTHESIA PREPROCEDURE EVALUATION
Case: [id number] Date/Time: 11/02/22 1545    Procedure: COLONOSCOPY (Anus)    Anesthesia type: MAC    Pre-op diagnosis: GI bleed    Location: [facility] ROOM 26 / SURGERY SAME DAY [facility]    Surgeons: Cadence Wang M.D.      71 yo female  Active lower GI bleed.  Hypotensive and tacycardic.   H&H sent.  Hct22.7.  Procedure cancelled and ICU physician notified.  Possible IR bleed scan.    P Med Hx:  Unspecified disorder of thyroid  Renal disorder  Alzheimer's disease   Hypothyroid  Other specified symptom associated with female genital organs  Cancer   Pain  Backpain  Anesthesia  Arthritis  Unspecified urinary incontinence  Alzheimer's disease      Relevant Problems   ENDO   (positive) Hypothyroid       Physical Exam    Airway   Mallampati: II  TM distance: >3 FB  Neck ROM: full       Cardiovascular - normal exam  Rhythm: regular  Rate: normal  (-) murmur     Dental - normal exam           Pulmonary - normal exam  Breath sounds clear to auscultation     Abdominal    Neurological - normal exam                 Anesthesia Plan    ASA 2       Plan - MAC               Induction: intravenous    Postoperative Plan: Postoperative administration of opioids is intended.    Pertinent diagnostic labs and testing reviewed    Informed Consent:    Anesthetic plan and risks discussed with patient.    Use of blood products discussed with: patient whom consented to blood products.

## 2022-11-02 NOTE — PROGRESS NOTES
Hospital Medicine Daily Progress Note    Date of Service  11/2/2022    Chief Complaint  Jayshree Reinoso is a 72 y.o. female admitted 11/1/2022 with     Hospital Course  72-year-old female with history of dementia, hypothyroidism and GI bleeding who presented 11/1 with lower GI bleeding.  Patient was not able to provide any history due to her dementia.  During her lunch on the day of admission patient fainted and was unresponsive for seconds.  Her family noticed blood coming down from her rectum.  Patient denied any nausea or vomiting.  Denied any abdominal pain or other abnormalities complaints.  Patient has history of GI bleeding from her diverticulitis and last colonoscopy 2016 with no any masses.  On admission CTA for abdomen did not show any active bleeding.  GI was consulted by ED physician.     Patient has mild to moderate dementia, she is alert to herself only, around her baseline    Interval Problem Update  Patient is currently n.p.o. for possible colonoscopy today or tomorrow pending operating room availability    Patient still having active bleeds.  This afternoon patient syncopized while on the potty    Patient was pale and unresponsive transiently    Patient was brought to the bed and laid flat and she regained consciousness after a few moments.    Patient had a pulse during the entire event    A stat LR bolus was ordered at 500 cc x 1    A stat hemoglobin was ordered.    I have discussed this patient's plan of care and discharge plan at IDT rounds today with Case Management, Nursing, Nursing leadership, and other members of the IDT team.    Consultants/Specialty  GI    Code Status  Full Code    Disposition  Patient is not medically cleared for discharge.   Anticipate discharge to to home with close outpatient follow-up.  I have placed the appropriate orders for post-discharge needs.    Review of Systems  Review of Systems   Constitutional:  Positive for malaise/fatigue. Negative for chills and fever.    HENT: Negative.     Eyes: Negative.    Respiratory: Negative.     Cardiovascular: Negative.    Gastrointestinal:  Positive for blood in stool and melena.   Neurological:  Positive for weakness.   Psychiatric/Behavioral: Negative.        Physical Exam  Temp:  [36.1 °C (97 °F)-36.9 °C (98.4 °F)] (P) 36.9 °C (98.4 °F)  Pulse:  [58-88] (P) 58  Resp:  [14-20] (P) 14  BP: (111-135)/(68-78) (P) 112/77  SpO2:  [91 %-100 %] (P) 91 %    Physical Exam  Constitutional:       General: She is not in acute distress.     Appearance: She is ill-appearing. She is not toxic-appearing or diaphoretic.   HENT:      Head: Normocephalic.      Nose: Nose normal.   Eyes:      General: No scleral icterus.        Left eye: No discharge.      Extraocular Movements: Extraocular movements intact.      Pupils: Pupils are equal, round, and reactive to light.   Cardiovascular:      Rate and Rhythm: Normal rate.      Pulses: Normal pulses.   Pulmonary:      Effort: No respiratory distress.      Breath sounds: No wheezing or rales.   Abdominal:      General: Abdomen is flat. There is no distension.      Palpations: There is no mass.      Tenderness: There is no abdominal tenderness. There is no left CVA tenderness, guarding or rebound.      Hernia: No hernia is present.   Musculoskeletal:         General: No swelling, tenderness, deformity or signs of injury.      Cervical back: Normal range of motion.      Right lower leg: No edema.   Skin:     Capillary Refill: Capillary refill takes 2 to 3 seconds.      Coloration: Skin is pale.      Findings: No bruising.   Neurological:      General: No focal deficit present.      Mental Status: She is alert.      Cranial Nerves: No cranial nerve deficit.      Motor: No weakness.      Gait: Gait normal.   Psychiatric:         Mood and Affect: Mood normal.       Fluids  No intake or output data in the 24 hours ending 11/02/22 1250    Laboratory  Recent Labs     11/01/22  1255 11/01/22  2308 11/02/22  1031   WBC  11.3*  --  11.6*   RBC 3.88*  --  3.51*   HEMOGLOBIN 12.0 10.5* 10.9*   HEMATOCRIT 37.1  --  34.3*   MCV 95.6  --  97.7   MCH 30.9  --  31.1   MCHC 32.3*  --  31.8*   RDW 52.3*  --  53.9*   PLATELETCT 270  --  308   MPV 10.3  --  10.4     Recent Labs     11/01/22  1255 11/02/22  1031   SODIUM 141 140   POTASSIUM 3.8 4.2   CHLORIDE 111 110   CO2 18* 20   GLUCOSE 123* 91   BUN 21 17   CREATININE 0.59 0.44*   CALCIUM 8.5 8.6     Recent Labs     11/01/22  1255   APTT <20.0*   INR 1.07               Imaging  DX-CHEST-PORTABLE (1 VIEW)   Final Result      No acute cardiopulmonary abnormality.         CTA ABDOMEN PELVIS W & W/O POST PROCESS   Final Result      1.  No evidence of active arterial gastrointestinal bleeding.   2.  Colonic diverticulosis. No diverticulitis.   3.  A 4.5 cm right posterior rectocele.                 Assessment/Plan  * GI bleeding- (present on admission)  Assessment & Plan  Patient has history of diverticulitis bleeding last in 2019   came with painless GI bleeding and syncope  Last colonoscopy in 2016, no family history of colon cancer.  CTA for abdomen did not show any active bleeding  Hemoglobin every 8 hours and blood transfusion if less than 7    GI md on case- plan for colonoscopy today        Vasovagal syncope- (present on admission)  Assessment & Plan  Related to acute GI bleed/acute blood loss anemia     Plan to get a stat CBC and transfuse PRBCs if hemoglobin less than 7    Start LR bolus of 500 cc x 1    Dizziness- (present on admission)  Assessment & Plan  Likely related to GI bleed   Monitor her hb       Dementia (HCC)- (present on admission)  Assessment & Plan  Moderate to advanced  On admission patient is alert to herself and age only, around her baseline  lives with her family  Continue nonpharmacological treatment to avoid delirium  Avoid benzo and antihistamine  Full code at this time.    Hypothyroid- (present on admission)  Assessment & Plan  Continue home dose of levothyroxine  100 mcg daily  Follow-up TSH with PCP       VTE prophylaxis: SCDs/TEDs    I have performed a physical exam and reviewed and updated ROS and Plan today (11/2/2022). In review of yesterday's note (11/1/2022), there are no changes except as documented above.

## 2022-11-02 NOTE — CARE PLAN
The patient is Watcher - Medium risk of patient condition declining or worsening         Progress made toward(s) clinical / shift goals:    Problem: Knowledge Deficit - Standard  Goal: Patient and family/care givers will demonstrate understanding of plan of care, disease process/condition, diagnostic tests and medications  Outcome: Not Progressing       Patient is not progressing towards the following goals:      Problem: Knowledge Deficit - Standard  Goal: Patient and family/care givers will demonstrate understanding of plan of care, disease process/condition, diagnostic tests and medications  Outcome: Not Progressing

## 2022-11-02 NOTE — ASSESSMENT & PLAN NOTE
Related to acute GI bleed/acute blood loss anemia     Plan to get a stat CBC and transfuse PRBCs if hemoglobin less than 7    Start LR bolus of 500 cc x 1

## 2022-11-02 NOTE — PROGRESS NOTES
Assumed care of patient 0700. Received Report from [facility] nurse. Patient A&O1, on 1L, Reporting a pain level of 0. Call light within reach, belongings within reach, Fall precautions in place, and bed alarm is on and bed in lowest position. Patient does not have any other needs at this time.

## 2022-11-02 NOTE — ANESTHESIA PROCEDURE NOTES
Arterial Line  Performed by: Osman Mckeon M.D.  Authorized by: Osman Mckeon M.D.     Start Time:  11/2/2022 4:40 PM  End Time:  11/2/2022 4:45 PM  Localization: ultrasound guidance and surface landmarks    Patient Location:  OR  Indication: continuous blood pressure monitoring        Catheter Size:  20 G  Seldinger Technique?: Yes    Laterality:  Right  Site:  Radial artery  Line Secured:  Antimicrobial disc, tape and transparent dressing  Events: patient tolerated procedure well with no complications     Alcohol prep, arrow catheter.  Difficult elver placement due to hypotension and small artery.  Required two attempts under U/S guidance.   Catheter secured with tegaderm and tape, no apparent complications.

## 2022-11-03 ENCOUNTER — APPOINTMENT (OUTPATIENT)
Dept: RADIOLOGY | Facility: MEDICAL CENTER | Age: 72
DRG: 377 | End: 2022-11-03
Attending: INTERNAL MEDICINE
Payer: COMMERCIAL

## 2022-11-03 PROBLEM — Z66 DO NOT RESUSCITATE STATUS: Status: RESOLVED | Noted: 2019-07-25 | Resolved: 2022-11-03

## 2022-11-03 PROBLEM — D72.829 LEUKOCYTOSIS: Status: ACTIVE | Noted: 2022-11-03

## 2022-11-03 LAB
ALBUMIN SERPL BCP-MCNC: 2.4 G/DL (ref 3.2–4.9)
ALBUMIN/GLOB SERPL: 1.2 G/DL
ALP SERPL-CCNC: 48 U/L (ref 30–99)
ALT SERPL-CCNC: 10 U/L (ref 2–50)
ANION GAP SERPL CALC-SCNC: 11 MMOL/L (ref 7–16)
AST SERPL-CCNC: 17 U/L (ref 12–45)
BASE EXCESS BLDA CALC-SCNC: -9 MMOL/L (ref -4–3)
BILIRUB SERPL-MCNC: 0.6 MG/DL (ref 0.1–1.5)
BODY TEMPERATURE: ABNORMAL DEGREES
BUN SERPL-MCNC: 14 MG/DL (ref 8–22)
CA-I BLD ISE-SCNC: 1.1 MMOL/L (ref 1.1–1.3)
CALCIUM SERPL-MCNC: 7.9 MG/DL (ref 8.5–10.5)
CHLORIDE SERPL-SCNC: 111 MMOL/L (ref 96–112)
CO2 BLDA-SCNC: 15 MMOL/L (ref 20–33)
CO2 SERPL-SCNC: 17 MMOL/L (ref 20–33)
CREAT SERPL-MCNC: 0.36 MG/DL (ref 0.5–1.4)
ERYTHROCYTE [DISTWIDTH] IN BLOOD BY AUTOMATED COUNT: 51.7 FL (ref 35.9–50)
ERYTHROCYTE [DISTWIDTH] IN BLOOD BY AUTOMATED COUNT: 55.3 FL (ref 35.9–50)
GFR SERPLBLD CREATININE-BSD FMLA CKD-EPI: 108 ML/MIN/1.73 M 2
GLOBULIN SER CALC-MCNC: 2 G/DL (ref 1.9–3.5)
GLUCOSE BLD STRIP.AUTO-MCNC: 102 MG/DL (ref 65–99)
GLUCOSE SERPL-MCNC: 136 MG/DL (ref 65–99)
HCO3 BLDA-SCNC: 14.6 MMOL/L (ref 17–25)
HCT VFR BLD AUTO: 30.6 % (ref 37–47)
HCT VFR BLD AUTO: 30.8 % (ref 37–47)
HCT VFR BLD AUTO: 33.4 % (ref 37–47)
HGB BLD-MCNC: 10.3 G/DL (ref 12–16)
HGB BLD-MCNC: 10.4 G/DL (ref 12–16)
HGB BLD-MCNC: 11.4 G/DL (ref 12–16)
MCH RBC QN AUTO: 30 PG (ref 27–33)
MCH RBC QN AUTO: 31 PG (ref 27–33)
MCHC RBC AUTO-ENTMCNC: 33.8 G/DL (ref 33.6–35)
MCHC RBC AUTO-ENTMCNC: 34.1 G/DL (ref 33.6–35)
MCV RBC AUTO: 88.8 FL (ref 81.4–97.8)
MCV RBC AUTO: 90.8 FL (ref 81.4–97.8)
PCO2 BLDA: 25.2 MMHG (ref 26–37)
PH BLDA: 7.37 [PH] (ref 7.4–7.5)
PLATELET # BLD AUTO: 201 K/UL (ref 164–446)
PLATELET # BLD AUTO: 229 K/UL (ref 164–446)
PMV BLD AUTO: 10.9 FL (ref 9–12.9)
PMV BLD AUTO: 11.2 FL (ref 9–12.9)
PO2 BLDA: 60 MMHG (ref 64–87)
POTASSIUM BLD-SCNC: 4.1 MMOL/L (ref 3.6–5.5)
POTASSIUM SERPL-SCNC: 4.2 MMOL/L (ref 3.6–5.5)
PROT SERPL-MCNC: 4.4 G/DL (ref 6–8.2)
RBC # BLD AUTO: 3.47 M/UL (ref 4.2–5.4)
RBC # BLD AUTO: 3.68 M/UL (ref 4.2–5.4)
SAO2 % BLDA: 91 % (ref 93–99)
SODIUM BLD-SCNC: 143 MMOL/L (ref 135–145)
SODIUM SERPL-SCNC: 139 MMOL/L (ref 135–145)
SPECIMEN DRAWN FROM PATIENT: ABNORMAL
WBC # BLD AUTO: 19.1 K/UL (ref 4.8–10.8)
WBC # BLD AUTO: 24.4 K/UL (ref 4.8–10.8)

## 2022-11-03 PROCEDURE — C9113 INJ PANTOPRAZOLE SODIUM, VIA: HCPCS | Performed by: INTERNAL MEDICINE

## 2022-11-03 PROCEDURE — 99232 SBSQ HOSP IP/OBS MODERATE 35: CPT | Performed by: INTERNAL MEDICINE

## 2022-11-03 PROCEDURE — 700117 HCHG RX CONTRAST REV CODE 255: Performed by: INTERNAL MEDICINE

## 2022-11-03 PROCEDURE — 82962 GLUCOSE BLOOD TEST: CPT

## 2022-11-03 PROCEDURE — 85027 COMPLETE CBC AUTOMATED: CPT

## 2022-11-03 PROCEDURE — 700111 HCHG RX REV CODE 636 W/ 250 OVERRIDE (IP): Performed by: INTERNAL MEDICINE

## 2022-11-03 PROCEDURE — 80053 COMPREHEN METABOLIC PANEL: CPT

## 2022-11-03 PROCEDURE — 99233 SBSQ HOSP IP/OBS HIGH 50: CPT | Performed by: NURSE PRACTITIONER

## 2022-11-03 PROCEDURE — 99233 SBSQ HOSP IP/OBS HIGH 50: CPT | Mod: MISDOCU | Performed by: NURSE PRACTITIONER

## 2022-11-03 PROCEDURE — 700105 HCHG RX REV CODE 258: Performed by: NURSE PRACTITIONER

## 2022-11-03 PROCEDURE — 85018 HEMOGLOBIN: CPT

## 2022-11-03 PROCEDURE — 85014 HEMATOCRIT: CPT

## 2022-11-03 PROCEDURE — 770020 HCHG ROOM/CARE - TELE (206)

## 2022-11-03 PROCEDURE — 74174 CTA ABD&PLVS W/CONTRAST: CPT

## 2022-11-03 RX ADMIN — SODIUM CHLORIDE, POTASSIUM CHLORIDE, SODIUM LACTATE AND CALCIUM CHLORIDE: 600; 310; 30; 20 INJECTION, SOLUTION INTRAVENOUS at 01:23

## 2022-11-03 RX ADMIN — PANTOPRAZOLE SODIUM 40 MG: 40 INJECTION, POWDER, FOR SOLUTION INTRAVENOUS at 05:09

## 2022-11-03 RX ADMIN — IOHEXOL 100 ML: 350 INJECTION, SOLUTION INTRAVENOUS at 00:50

## 2022-11-03 RX ADMIN — SODIUM CHLORIDE, POTASSIUM CHLORIDE, SODIUM LACTATE AND CALCIUM CHLORIDE: 600; 310; 30; 20 INJECTION, SOLUTION INTRAVENOUS at 21:00

## 2022-11-03 RX ADMIN — PANTOPRAZOLE SODIUM 40 MG: 40 INJECTION, POWDER, FOR SOLUTION INTRAVENOUS at 18:24

## 2022-11-03 ASSESSMENT — LIFESTYLE VARIABLES
EVER HAD A DRINK FIRST THING IN THE MORNING TO STEADY YOUR NERVES TO GET RID OF A HANGOVER: NO
CONSUMPTION TOTAL: NEGATIVE
TOTAL SCORE: 0
ON A TYPICAL DAY WHEN YOU DRINK ALCOHOL HOW MANY DRINKS DO YOU HAVE: 0
DOES PATIENT WANT TO STOP DRINKING: NO
ALCOHOL_USE: NO
AVERAGE NUMBER OF DAYS PER WEEK YOU HAVE A DRINK CONTAINING ALCOHOL: 0
TOTAL SCORE: 0
HAVE YOU EVER FELT YOU SHOULD CUT DOWN ON YOUR DRINKING: NO
HAVE PEOPLE ANNOYED YOU BY CRITICIZING YOUR DRINKING: NO
TOTAL SCORE: 0
EVER FELT BAD OR GUILTY ABOUT YOUR DRINKING: NO
HOW MANY TIMES IN THE PAST YEAR HAVE YOU HAD 5 OR MORE DRINKS IN A DAY: 0

## 2022-11-03 ASSESSMENT — FIBROSIS 4 INDEX: FIB4 SCORE: 1.93

## 2022-11-03 ASSESSMENT — ENCOUNTER SYMPTOMS
BLOOD IN STOOL: 1
NAUSEA: 0
WEAKNESS: 0
VOMITING: 0
ABDOMINAL PAIN: 0
MEMORY LOSS: 1
SHORTNESS OF BREATH: 0
DIZZINESS: 0
MYALGIAS: 0

## 2022-11-03 ASSESSMENT — PAIN DESCRIPTION - PAIN TYPE
TYPE: ACUTE PAIN
TYPE: ACUTE PAIN

## 2022-11-03 NOTE — PROGRESS NOTES
Report received from pre op RN Valerie. Per RN, pt's son took all belongings home. No other concerns at this time.

## 2022-11-03 NOTE — CONSULTS
Critical Care Consultation    Date of consult: 11/2/2022    Referring Physician  Dr Navarro and Anesthesia Dr Mckeon    Reason for Consultation  Hemorrhagic shock     History of Presenting Illness  72 y.o. female who presented 11/1/2022 with Dementia alzheimer type, hypothyroidism, diverticulitis prior GI bleed. That was admitted 10/1 that was briefly found unresponsive for a second or so. Family then noticed blood coming from her rectum. She had CTA on admission with no source of bleeding but with colonic diverticulosis. She was admitted to floor with GI consulted and today was going to OR for colonscopy. Prior to any anesthesia she was confused and was in shock so was resusciated after poor IV access. Given 2L of crystalloid and 1 unit of blood and found to have acute drop in her Hg 9.9 to 7.1. The case was canceled and I was called for ICU admission and further resuscitation. Patient can provide minimal hx as she thinks it is 2002, October. But she denies abdominal pain, chest pain or shortness of breath. Code status couldn't be further discussed. Bedside nursing in [facility] PACU described burgundy stool with clots. Per chart was DNR 2020 ->Full code this admission.     Code Status  Full Code    Review of Systems  Review of Systems   Unable to perform ROS: Mental status change     Past Medical History   has a past medical history of Alzheimer's disease (HCC), Alzheimer's disease (HCC), Anesthesia, Arthritis, Backpain, Cancer (HCC) (2010), Hypothyroid, Other specified symptom associated with female genital organs, Pain, Renal disorder, Unspecified disorder of thyroid, and Unspecified urinary incontinence.    She has no past medical history of Breast cancer (HCC).    Surgical History   has a past surgical history that includes scleral buckling (7/9/08); rectus repair (10/9/08); hernia repair; inguinal hernia repair (12/18/2008); laparoscopy (5/7/2009); femoral hernia repair (5/7/2009); fusion, spine, lumbar, plif  (2/27/2013); lumbar laminectomy diskectomy (2/27/2013); gyn surgery; vaginectomy (7/25/2011); gyn surgery; gyn surgery; knee arthroplasty total (Right, 7/11/2016); gastroscopy-endo (8/11/2016); gastroscopy-endo (N/A, 7/26/2019); and colonoscopy - endo (N/A, 7/27/2019).    Family History  family history is not on file.    Social History   reports that she has never smoked. She has never used smokeless tobacco. She reports that she does not drink alcohol and does not use drugs.    Medications  Home Medications       Reviewed by Alicia Caicedo R.N. (Registered Nurse) on 11/02/22 at 1522  Med List Status: Complete     Medication Last Dose Status   acetaminophen (Tylenol) tablet 650 mg  Active   B Complex Vitamins (VITAMIN B COMPLEX PO) 11/1/2022 Active   bisacodyl (DULCOLAX) suppository 10 mg  Active   CALCIUM-MAGNESIUM PO 11/1/2022 Active   Coenzyme Q10 (CO Q 10 PO) 11/1/2022 Active   levothyroxine (SYNTHROID) 88 MCG Tab 11/1/2022 Active   levothyroxine (SYNTHROID) tablet 88 mcg  Active   magnesium hydroxide (MILK OF MAGNESIA) suspension 30 mL  Active   Melatonin 10 MG Tab 10/31/2022 Active   melatonin tablet 10 mg  Active   NS infusion  Active   pantoprazole (Protonix) injection 40 mg  Active   peg-electrolyte solution (MOVIPREP) package 100 g  Active   polyethylene glycol/lytes (MIRALAX) PACKET 1 Packet  Active   senna-docusate (PERICOLACE or SENOKOT S) 8.6-50 MG per tablet 2 Tablet  Active   vitamin k 100 MCG tablet 11/1/2022 Active   Vitamins A & D (VITAMIN A & D PO) 11/1/2022 Active                  Current Facility-Administered Medications   Medication Dose Route Frequency Provider Last Rate Last Admin    peg-electrolyte solution (MOVIPREP) package 100 g  100 g Oral BID Cadence Wang M.D.   100 g at 11/02/22 0949    LIDOCAINE HCL (PF) 1 % INJ SOLN             lactated ringers infusion   Intravenous Continuous Mikael Murdock M.D. 10 mL/hr at 11/02/22 1700 New Bag at 11/02/22 1700    NS infusion    Intravenous Continuous Cooper Sutherland M.D.        pantoprazole (Protonix) injection 40 mg  40 mg Intravenous BID Cooper Sutherland M.D.        levothyroxine (SYNTHROID) tablet 88 mcg  88 mcg Oral AM ES Edith Mae M.D.   88 mcg at 11/02/22 0519    melatonin tablet 10 mg  10 mg Oral QHS Edith Mae M.D.   10 mg at 11/01/22 2100    senna-docusate (PERICOLACE or SENOKOT S) 8.6-50 MG per tablet 2 Tablet  2 Tablet Oral BID Edith Mae M.D.   2 Tablet at 11/02/22 0519    And    polyethylene glycol/lytes (MIRALAX) PACKET 1 Packet  1 Packet Oral QDAY PRN Edith Mae M.D.        And    magnesium hydroxide (MILK OF MAGNESIA) suspension 30 mL  30 mL Oral QDAY PRN Edith Mae M.D.        And    bisacodyl (DULCOLAX) suppository 10 mg  10 mg Rectal QDAY PRN Edith Mae M.D.        NS infusion   Intravenous Continuous Edith Mae M.D. 75 mL/hr at 11/01/22 1500 New Bag at 11/01/22 1500    acetaminophen (Tylenol) tablet 650 mg  650 mg Oral Q6HRS PRN Edith Mae M.D.           Allergies  No Known Allergies    Vital Signs last 24 hours  Temp:  [36.1 °C (97 °F)-37.1 °C (98.7 °F)] 36.3 °C (97.4 °F)  Pulse:  [] 108  Resp:  [14-22] 20  BP: ()/(55-78) 91/60  SpO2:  [91 %-100 %] 94 %    Physical Exam  Physical Exam  Vitals and nursing note reviewed.   Constitutional:       General: She is not in acute distress.     Appearance: She is ill-appearing. She is not toxic-appearing.      Comments: She is pale as the sheets she is laying on no acute distress.    HENT:      Head: Normocephalic.      Nose: Nose normal.      Mouth/Throat:      Mouth: Mucous membranes are dry.   Eyes:      Pupils: Pupils are equal, round, and reactive to light.   Cardiovascular:      Rate and Rhythm: Tachycardia present. Rhythm irregular.      Heart sounds: No murmur heard.  Pulmonary:      Effort: No respiratory distress.      Breath sounds: No stridor. No wheezing or rhonchi.   Abdominal:       General: There is no distension.      Palpations: There is no mass.      Tenderness: There is no abdominal tenderness. There is no guarding or rebound.      Hernia: No hernia is present.   Musculoskeletal:         General: No swelling or tenderness.   Skin:     Coloration: Skin is pale. Skin is not jaundiced.   Neurological:      Comments: She is confused to specifics with non focal exam, talks with limited speech.    Psychiatric:      Comments: Unable to determine       Fluids  No intake or output data in the 24 hours ending 11/02/22 1812    Laboratory  Recent Results (from the past 48 hour(s))   CBC WITH DIFFERENTIAL    Collection Time: 11/01/22 12:55 PM   Result Value Ref Range    WBC 11.3 (H) 4.8 - 10.8 K/uL    RBC 3.88 (L) 4.20 - 5.40 M/uL    Hemoglobin 12.0 12.0 - 16.0 g/dL    Hematocrit 37.1 37.0 - 47.0 %    MCV 95.6 81.4 - 97.8 fL    MCH 30.9 27.0 - 33.0 pg    MCHC 32.3 (L) 33.6 - 35.0 g/dL    RDW 52.3 (H) 35.9 - 50.0 fL    Platelet Count 270 164 - 446 K/uL    MPV 10.3 9.0 - 12.9 fL    Neutrophils-Polys 71.70 44.00 - 72.00 %    Lymphocytes 19.00 (L) 22.00 - 41.00 %    Monocytes 5.80 0.00 - 13.40 %    Eosinophils 2.40 0.00 - 6.90 %    Basophils 0.70 0.00 - 1.80 %    Immature Granulocytes 0.40 0.00 - 0.90 %    Nucleated RBC 0.00 /100 WBC    Neutrophils (Absolute) 8.09 (H) 2.00 - 7.15 K/uL    Lymphs (Absolute) 2.14 1.00 - 4.80 K/uL    Monos (Absolute) 0.65 0.00 - 0.85 K/uL    Eos (Absolute) 0.27 0.00 - 0.51 K/uL    Baso (Absolute) 0.08 0.00 - 0.12 K/uL    Immature Granulocytes (abs) 0.04 0.00 - 0.11 K/uL    NRBC (Absolute) 0.00 K/uL   COMP METABOLIC PANEL    Collection Time: 11/01/22 12:55 PM   Result Value Ref Range    Sodium 141 135 - 145 mmol/L    Potassium 3.8 3.6 - 5.5 mmol/L    Chloride 111 96 - 112 mmol/L    Co2 18 (L) 20 - 33 mmol/L    Anion Gap 12.0 7.0 - 16.0    Glucose 123 (H) 65 - 99 mg/dL    Bun 21 8 - 22 mg/dL    Creatinine 0.59 0.50 - 1.40 mg/dL    Calcium 8.5 8.5 - 10.5 mg/dL    AST(SGOT) 17 12  - 45 U/L    ALT(SGPT) 13 2 - 50 U/L    Alkaline Phosphatase 81 30 - 99 U/L    Total Bilirubin 0.2 0.1 - 1.5 mg/dL    Albumin 3.7 3.2 - 4.9 g/dL    Total Protein 6.6 6.0 - 8.2 g/dL    Globulin 2.9 1.9 - 3.5 g/dL    A-G Ratio 1.3 g/dL   LIPASE    Collection Time: 11/01/22 12:55 PM   Result Value Ref Range    Lipase 17 11 - 82 U/L   PROTHROMBIN TIME    Collection Time: 11/01/22 12:55 PM   Result Value Ref Range    PT 13.8 12.0 - 14.6 sec    INR 1.07 0.87 - 1.13   APTT    Collection Time: 11/01/22 12:55 PM   Result Value Ref Range    APTT <20.0 (L) 24.7 - 36.0 sec   ESTIMATED GFR    Collection Time: 11/01/22 12:55 PM   Result Value Ref Range    GFR (CKD-EPI) 96 >60 mL/min/1.73 m 2   FERRITIN    Collection Time: 11/01/22 12:55 PM   Result Value Ref Range    Ferritin 36.3 10.0 - 291.0 ng/mL   IRON/TOTAL IRON BIND    Collection Time: 11/01/22 12:55 PM   Result Value Ref Range    Iron 58 40 - 170 ug/dL    Total Iron Binding 277 250 - 450 ug/dL    Unsat Iron Binding 219 110 - 370 ug/dL    % Saturation 21 15 - 55 %   COD (ADULT)    Collection Time: 11/01/22  1:00 PM   Result Value Ref Range    ABO Grouping Only O     Rh Grouping Only POS     Antibody Screen-Cod NEG     Component R       R99                 Red Cells, LR       [id number]   issued       11/02/22   17:11      Product Type R99     Dispense Status issued     Unit Number (Barcoded) [id number]     Product Code (Barcoded) F8407K16     Blood Type (Barcoded) 5100    LACTIC ACID    Collection Time: 11/01/22  3:23 PM   Result Value Ref Range    Lactic Acid 2.4 (H) 0.5 - 2.0 mmol/L   HGB (Hemoglobin) for 48 hours    Collection Time: 11/01/22 11:08 PM   Result Value Ref Range    Hemoglobin 10.5 (L) 12.0 - 16.0 g/dL   CBC WITH DIFFERENTIAL    Collection Time: 11/02/22 10:31 AM   Result Value Ref Range    WBC 11.6 (H) 4.8 - 10.8 K/uL    RBC 3.51 (L) 4.20 - 5.40 M/uL    Hemoglobin 10.9 (L) 12.0 - 16.0 g/dL    Hematocrit 34.3 (L) 37.0 - 47.0 %    MCV 97.7 81.4 - 97.8 fL     MCH 31.1 27.0 - 33.0 pg    MCHC 31.8 (L) 33.6 - 35.0 g/dL    RDW 53.9 (H) 35.9 - 50.0 fL    Platelet Count 308 164 - 446 K/uL    MPV 10.4 9.0 - 12.9 fL    Neutrophils-Polys 66.90 44.00 - 72.00 %    Lymphocytes 20.80 (L) 22.00 - 41.00 %    Monocytes 7.40 0.00 - 13.40 %    Eosinophils 3.70 0.00 - 6.90 %    Basophils 0.90 0.00 - 1.80 %    Immature Granulocytes 0.30 0.00 - 0.90 %    Nucleated RBC 0.00 /100 WBC    Neutrophils (Absolute) 7.73 (H) 2.00 - 7.15 K/uL    Lymphs (Absolute) 2.41 1.00 - 4.80 K/uL    Monos (Absolute) 0.86 (H) 0.00 - 0.85 K/uL    Eos (Absolute) 0.43 0.00 - 0.51 K/uL    Baso (Absolute) 0.10 0.00 - 0.12 K/uL    Immature Granulocytes (abs) 0.04 0.00 - 0.11 K/uL    NRBC (Absolute) 0.00 K/uL   MAGNESIUM    Collection Time: 11/02/22 10:31 AM   Result Value Ref Range    Magnesium 2.1 1.5 - 2.5 mg/dL   Basic Metabolic Panel    Collection Time: 11/02/22 10:31 AM   Result Value Ref Range    Sodium 140 135 - 145 mmol/L    Potassium 4.2 3.6 - 5.5 mmol/L    Chloride 110 96 - 112 mmol/L    Co2 20 20 - 33 mmol/L    Glucose 91 65 - 99 mg/dL    Bun 17 8 - 22 mg/dL    Creatinine 0.44 (L) 0.50 - 1.40 mg/dL    Calcium 8.6 8.5 - 10.5 mg/dL    Anion Gap 10.0 7.0 - 16.0   LACTIC ACID    Collection Time: 11/02/22 10:31 AM   Result Value Ref Range    Lactic Acid 1.0 0.5 - 2.0 mmol/L   ESTIMATED GFR    Collection Time: 11/02/22 10:31 AM   Result Value Ref Range    GFR (CKD-EPI) 103 >60 mL/min/1.73 m 2   CBC WITH DIFFERENTIAL    Collection Time: 11/02/22  1:40 PM   Result Value Ref Range    WBC 21.4 (H) 4.8 - 10.8 K/uL    RBC 3.22 (L) 4.20 - 5.40 M/uL    Hemoglobin 9.9 (L) 12.0 - 16.0 g/dL    Hematocrit 31.7 (L) 37.0 - 47.0 %    MCV 98.4 (H) 81.4 - 97.8 fL    MCH 30.7 27.0 - 33.0 pg    MCHC 31.2 (L) 33.6 - 35.0 g/dL    RDW 54.6 (H) 35.9 - 50.0 fL    Platelet Count 344 164 - 446 K/uL    MPV 10.7 9.0 - 12.9 fL    Neutrophils-Polys 82.30 (H) 44.00 - 72.00 %    Lymphocytes 9.90 (L) 22.00 - 41.00 %    Monocytes 5.40 0.00 -  13.40 %    Eosinophils 1.10 0.00 - 6.90 %    Basophils 0.50 0.00 - 1.80 %    Immature Granulocytes 0.80 0.00 - 0.90 %    Nucleated RBC 0.00 /100 WBC    Neutrophils (Absolute) 17.60 (H) 2.00 - 7.15 K/uL    Lymphs (Absolute) 2.11 1.00 - 4.80 K/uL    Monos (Absolute) 1.16 (H) 0.00 - 0.85 K/uL    Eos (Absolute) 0.23 0.00 - 0.51 K/uL    Baso (Absolute) 0.11 0.00 - 0.12 K/uL    Immature Granulocytes (abs) 0.17 (H) 0.00 - 0.11 K/uL    NRBC (Absolute) 0.00 K/uL   HEMOGLOBIN AND HEMATOCRIT    Collection Time: 11/02/22  4:32 PM   Result Value Ref Range    Hemoglobin 7.1 (L) 12.0 - 16.0 g/dL    Hematocrit 22.7 (L) 37.0 - 47.0 %       Imaging  DX-CHEST-PORTABLE (1 VIEW)   Final Result      No acute cardiopulmonary abnormality.         CTA ABDOMEN PELVIS W & W/O POST PROCESS   Final Result      1.  No evidence of active arterial gastrointestinal bleeding.   2.  Colonic diverticulosis. No diverticulitis.   3.  A 4.5 cm right posterior rectocele.            DX-CHEST-PORTABLE (1 VIEW)    (Results Pending)       Assessment/Plan  * GI bleeding- (present on admission)  Assessment & Plan  Serial monitor hg/hct and transfuse for hg < 7   Correct and monitor coagulopathy  GI consulting already, consider IR, or surgical consultation   Will recheck CTA as directed by GI post volume resuscitation  Will place on protonix BID until source is identified but consistent with lower GI bleed most likely  Lower gi bleed: brisk upper gi bleed, aorticenteric fistula, angiodysplasia, neoplasm, ischemic colitis, diverticular bleed, polyp, hemorrhoid.       Hemorrhagic shock (HCC)- (present on admission)  Assessment & Plan  Balance resuscitation  Check Teg w/ mapping, ical   Serial CBC  Consider TXA  Give another PRBC now (2 units total)    DNR (do not resuscitate)- (present on admission)  Assessment & Plan  Recommend re-evaluation of advance care planning.     Dementia (HCC)- (present on admission)  Assessment & Plan  Hx of delirium like  precautions  Likely worsened from shock state  Check TSH with Free T4    Hypothyroid- (present on admission)  Assessment & Plan  Continue synthyorid  Recheck TSH FT4      Discussed patient condition and risk of morbidity and/or mortality with Hospitalist, RN, Patient, and anesthesia .      The patient remains critically ill with hemorrhagic shock and active blood transfusion.  Critical care time = 45 minutes in directly providing and coordinating critical care and extensive data review.  No time overlap and excludes procedures.

## 2022-11-03 NOTE — DISCHARGE PLANNING
Care Transition Team Discharge Planning    Anticipated Discharge Information  Discharge Disposition: Discharged to home/self care (01)     Discharge Plan:  Chart review and assessment completed from information obtained. Patient was BIB EMS from  home w/ GIB, pt w/ Pmhx dementia,  PT live in a single level house with spouse ( Per) .  Spouse is involved with care. Pt independent w/ ADL'S, no DME , pt s/p 4 U prbc's, plan to monitor GIB/HGB, IVF , transfer to floor. Anticipate home w/ family .       Care Transition Team Assessment    Information Source  Orientation Level: Oriented to person (Per RN assessment)  Information Given By:  (Chart review)    Readmission Evaluation  Is this a readmission?: No    Elopement Risk  Legal Hold: No  Ambulatory or Self Mobile in Wheelchair: No-Not an Elopement Risk  Elopement Risk: Not at Risk for Elopement    Interdisciplinary Discharge Planning  Lives with - Patient's Self Care Capacity: Spouse  Patient or legal guardian wants to designate a caregiver: No  Support Systems: Children, Family Member(s), Friends / Neighbors, Spouse / Significant Other  Housing / Facility: [address] [facility]  Durable Medical Equipment: Not Applicable    Discharge Preparedness  What are your discharge supports?: Spouse, Child  Prior Functional Level: Ambulatory    Functional Assesment  Prior Functional Level: Ambulatory    Finances  Financial Barriers to Discharge: No  Prescription Coverage: Yes    Vision / Hearing Impairment  Vision Impairment : Yes  Right Eye Vision: Wears Glasses  Left Eye Vision: Wears Glasses  Hearing Impairment : No    Advance Directive  Advance Directive?: None    Domestic Abuse  Have you ever been the victim of abuse or violence?: No  Physical Abuse or Sexual Abuse: No  Verbal Abuse or Emotional Abuse: No  Possible Abuse/Neglect Reported to:: Not Applicable    Discharge Risks or Barriers  Discharge risks or barriers?: No  Patient risk factors: Cognitive / sensory / physical  deficit    Anticipated Discharge Information  Discharge Disposition: Discharged to home/self care (01)    Chika WOODY, RN [facility]   Care Coordinator

## 2022-11-03 NOTE — CARE PLAN
"The patient is Watcher - Medium risk of patient condition declining or worsening    Shift Goals  Clinical Goals: Decreased Bloody BMs, Increase H and H, Get pt to colonoscopy  Patient Goals: comfort  Family Goals: VINCENT    Progress made toward(s) clinical / shift goals:      Patient is not progressing towards the following goals:      Problem: Psychosocial  Goal: Patient's ability to re-evaluate and adapt role responsibilities will improve  Outcome: Not Progressing     Problem: Bowel Elimination  Goal: Establish and maintain regular bowel function  Outcome: Not Progressing     Report received from the day shift RN. The patient's neuro assessment was conducted with day shift RN at beginning of shift. The patient is only oriented to self as a baseline. The patient had an eventful night. The patient received boluses of NS and LR to improve SBP. The patient received a total of 4 units of PRBCs. The patient was taken to CT for a scan. The patient had numerous bloody BM and required linen and gown changes throughout the night. A new PIV was placed on the patient using US. Although the line originally flushed well it stopped being able to be flushed and was removed. The patient also received electrolyte replacements. Although the patient follows commands they were resistant to having care provided because they were \"tired and wanted to sleep.\" The patient refused to wear their nasal cannula overnight/when sleeping because \"it makes my nose feel funny.\" The patient would not wear a gown for the majority of the shift. When covered with blankets to protect modesty the patient would kick off the blankets and say \"I don't want anything on me.\"  Report was given to the oncoming RN. Together a neuro assessment was conducted.   "

## 2022-11-03 NOTE — ASSESSMENT & PLAN NOTE
-History of due to diverticuli  -Last BM evening of 11/2 was bright red blood  -GI attempted colonoscopy on 11/2, however aborted due to instability  -Continue to trend hemoglobin  -If patient develops acute bleed again, GI will consider colonoscopy, otherwise monitor for now  -CTA identified no source of bleeding  -Continue Protonix

## 2022-11-03 NOTE — PROGRESS NOTES
Brief GI note:      Patient was brought to the pre-op area before colonoscopy. The patient is hypotensive, tachycardic, cold and pale. The patient started bleeding after taking the preparation for colonoscopy. She was bolused on the floor before coming down.    The patient was assessed by anesthesia, an Arterial line was placed, a second IV was placed. CBC was sent. Hemoglobin is 7.1 down from 9.9 at 13:40 today.    The patient is therefore unstable for anesthesia and colonoscopy. The colonoscopy was canceled.  2units of PRBCs were ordered. The patient will be transferred to the ICU.  I updated Dr Navarro and Dr Mckeon (anesthesia) contacted the ICU attending Dr Sutherland.    Once stabilized and transfused, the patient will need a CTA or bleeding scan to identify source of bleeding which is likely a diverticular bleed. If a bleed is identified, the patient will need an IR consult for embolization.

## 2022-11-03 NOTE — OR NURSING
1545- Dr Murdock at bedside. Increasing HR and continued hypotension noted   IVF infusing to (R) upper arm  1600- multiple attempts made to obtain H and H- unable to draw blood  1640- art line and additional PIV placed by Mack and Alexa.  Labs sent.  1655- H and H resulted- Mathieu Patel and Alexa aware, procedure canceled and blood ordered.   1716- blood transfusion initiated.     1750- Dr Sutherland at bedside  Pt cleaned of small amount of dark red stool     1800- report called to Niya, RN RICU  1806- pt transported to [facility] via [facility] on monitor and O2 by Anisha and Tavia, RNs

## 2022-11-03 NOTE — PROGRESS NOTES
Received patient to unit at 1811. Arrived to unit with 2 pre op Rns on monitor and on stretcher. VSS on arrival. 2 RN skin check completed with [place] LEE. Redness noted to bilateral heels, however blanching. Various bruises and small skin tears noted to arms bilaterally. Rings on bilateral ring fingers. Left in place at this time. Glasses on. Glasses case in nurse . All other belongings sent home with .  not present at admission, however notified of admission by pre op team. No further concerns at this time.

## 2022-11-03 NOTE — ASSESSMENT & PLAN NOTE
-History of  -Mentation currently at her baseline.  Oriented to self only without behavioral disturbance  -Sleep hygiene  -Frequent reorientation

## 2022-11-03 NOTE — PROGRESS NOTES
Gastroenterology Progress Note               Author:  MADONNA Adler Date & Time Created: 11/3/2022 9:29 AM       Patient ID:  Name:             Jayshree Reinoso  YOB: 1950  Age:                 72 y.o.  female  MRN:               [id number]        Medical Decision Making, by Problem:  Active Hospital Problems    Diagnosis     Melena [K92.1]     Vasovagal syncope [R55]     Hemorrhagic shock (HCC) [R57.8]     GI bleeding [K92.2]     Dizziness [R42]     DNR (do not resuscitate) [Z66]     Dementia (HCC) [F03.90]     Hypothyroid [E03.9]            Presenting Chief Complaint:  Lower GI bleeding     History of Present Illness: This is a 72 year old female with a past medical [id number] of dementia, hypothyroidism, diverticulosis, prior GI bleeding secondary to diverticulosis admitted 11/1/2022 for lower GI bleeding. Patient was very limited in providing history due to her dementia but her , Per, was at bedside to provide history. He states that the patient was in her normal state of health until prior to arrival. He states they were out at lunch and patient slumped over, he rushed to support her so she didn't fall, and patient was unresponsive for 30-60 seconds. When she awoke, she was complaining of abdominal pain and guarding her abdomen. EMS was called and stated that patient was hypotensive. Upon arrival to the emergency department, patient was noted to have gross amount of blood from rectum going down her legs. Patient reports that she had blood stool this morning only. She, furthermore, reports chronic headache but denies nausea, vomiting, dizziness, headache, shortness of breath, or weakness. She is normotensive. Denies pain. Hgb 12, plt 270, lactic acid 2.4, INR 1.07. BUN/Cr 35.5. CTA abdomen negative fo ractive arterial bleeding, diverticulosis noted but no diverticulitis, and a 4.5cm right posterior rectocele seen.   Of note, patient was admitted in 2016 and 2019 for acute blood  loss, melena, and hematochezia due to GI bleeding. She had EGD and colonoscopy with [facility] provider in 2016 and had repeat colonoscopy in 2019. No active bleeding was seen at that time. Patient did not follow up with GI outpatient since.   Denies current or previous tobacco use, alcohol use or recreational drug use.       Interval History:  11/2/2022: Patient seen at bedside with . Hgb stable. Patient/nursing reports one BM last night with BRBPR. Patient did not complete prep for colonoscopy last night/this morning. Discussed with nursing to do prep today, complete by 1400 and pending patient completion of prep, will plan for colonoscopy today. If unable to complete, will plan for colonoscopy tomorrow.  verbalized understanding, agreeable, and will encourage patient to drink prep.     11/3/2022: Patient with multiple bouts of bloody stools yesterday, became hypotensive, tachycardic, pale, and worsening in mental cognition. Colonoscopy cancelled, patient transferred to ICU. Received a total of 4 units of PRBCs. CTA abdomen negative for bleeding. Hgb 11.4 this morning. Discussed with  and nursing at bedside options for monitoring patient for resolution of bleeding, option of doing a tagged bleeding scan or may re-try cleanse for colonoscopy.  states he would like to monitor at this time due to the fact that studies (CTA and bleeding scan) may not show bleeding if patient is not actively bleeding. He is agreeable to performing bleeding scan if needed but would like to monitor patient at this time.     Hospital Medications:  Current Facility-Administered Medications   Medication Dose Frequency Provider Last Rate Last Admin    pantoprazole (Protonix) injection 40 mg  40 mg BID Cooper Sutherland M.D.   40 mg at 11/03/22 0509    lactated ringers infusion   Continuous Mitra Earl 100 mL/hr at 11/03/22 0123 New Bag at 11/03/22 0123    ondansetron (ZOFRAN) syringe/vial injection 4 mg  4  "mg Q6HRS PRN Cooper Sutherland M.D.   4 mg at 11/02/22 1901    levothyroxine (SYNTHROID) tablet 88 mcg  88 mcg AM ES Edith Mae M.D.   88 mcg at 11/02/22 0519    melatonin tablet 10 mg  10 mg QHS Edith Mae M.D.   10 mg at 11/01/22 2100    senna-docusate (PERICOLACE or SENOKOT S) 8.6-50 MG per tablet 2 Tablet  2 Tablet BID Edith Mae M.D.   2 Tablet at 11/02/22 0519    And    polyethylene glycol/lytes (MIRALAX) PACKET 1 Packet  1 Packet QDAY PRN Edith Mae M.D.        And    magnesium hydroxide (MILK OF MAGNESIA) suspension 30 mL  30 mL QDAY PRN Edith Mae M.D.        And    bisacodyl (DULCOLAX) suppository 10 mg  10 mg QDAY PRN Edith Mae M.D.        NS infusion   Continuous Edith Mae M.D.   Stopped at 11/03/22 0700    acetaminophen (Tylenol) tablet 650 mg  650 mg Q6HRS PRN Edith Mae M.D.       Last reviewed on 11/2/2022  3:22 PM by Alicia Caicedo R.N.       Vital signs:  Weight/BMI: Body mass index is 24.15 kg/m².  /74   Pulse 68   Temp 36.5 °C (97.7 °F) (Temporal)   Resp 18   Ht 1.624 m (5' 3.94\")   Wt 63.7 kg (140 lb 6.9 oz)   SpO2 90%   Vitals:    11/03/22 0600 11/03/22 0700 11/03/22 0800 11/03/22 0900   BP: (!) 92/69 (!) 92/69 124/67 108/74   Pulse: 72 (!) 108 (!) 110 68   Resp: (!) 21 (!) 22 (!) 22 18   Temp:   36.5 °C (97.7 °F)    TempSrc:   Temporal    SpO2: 92% 90%     Weight:       Height:         Oxygen Therapy:  Pulse Oximetry: 90 %, O2 (LPM): 2, O2 Delivery Device: Room air w/o2 available    Intake/Output Summary (Last 24 hours) at 11/3/2022 0929  Last data filed at 11/3/2022 0800  Gross per 24 hour   Intake 7343.78 ml   Output 0 ml   Net 7343.78 ml       Review of Systems   Unable to perform ROS: Dementia   Gastrointestinal:  Positive for blood in stool. Negative for abdominal pain, nausea and vomiting.   All other systems reviewed and are negative.        Physical Exam:  Physical Exam  Vitals and nursing note " reviewed.   Constitutional:       General: She is not in acute distress.     Appearance: Normal appearance. She is normal weight. She is ill-appearing. She is not toxic-appearing.   HENT:      Head: Normocephalic.      Nose: Nose normal.      Mouth/Throat:      Mouth: Mucous membranes are moist.      Pharynx: Oropharynx is clear.   Eyes:      General: No scleral icterus.     Extraocular Movements: Extraocular movements intact.      Conjunctiva/sclera: Conjunctivae normal.      Pupils: Pupils are equal, round, and reactive to light.   Cardiovascular:      Rate and Rhythm: Normal rate and regular rhythm.      Pulses: Normal pulses.      Heart sounds: Normal heart sounds. No murmur heard.    No gallop.   Pulmonary:      Effort: Pulmonary effort is normal. No respiratory distress.      Breath sounds: Normal breath sounds.   Chest:      Chest wall: No tenderness.   Abdominal:      General: Abdomen is flat. Bowel sounds are normal. There is no distension.      Palpations: Abdomen is soft.      Tenderness: There is no abdominal tenderness.   Musculoskeletal:      Right lower leg: Edema present.      Left lower leg: Edema present.   Skin:     General: Skin is warm and dry.      Capillary Refill: Capillary refill takes less than 2 seconds.      Coloration: Skin is pale. Skin is not jaundiced.   Neurological:      Mental Status: She is alert. Mental status is at baseline. She is disoriented.      Motor: Weakness present.   Psychiatric:         Mood and Affect: Mood normal.         Behavior: Behavior normal.           Labs:  Recent Labs     11/02/22  1031 11/02/22  1834 11/03/22 0228   SODIUM 140 140 139   POTASSIUM 4.2 4.2 4.2   CHLORIDE 110 110 111   CO2 20 17* 17*   BUN 17 17 14   CREATININE 0.44* 0.34* 0.36*   MAGNESIUM 2.1  --   --    CALCIUM 8.6 7.4* 7.9*       Recent Labs     11/01/22  1255 11/02/22  1031 11/02/22  1834 11/03/22  0228   ALTSGPT 13  --   --  10   ASTSGOT 17  --   --  17   ALKPHOSPHAT 81  --   --  48    TBILIRUBIN 0.2  --   --  0.6   LIPASE 17  --   --   --    GLUCOSE 123* 91 148* 136*       Recent Labs     11/01/22  1255 11/02/22  1031 11/02/22  1340 11/02/22 2313 11/03/22 0228 11/03/22  0830   WBC 11.3* 11.6* 21.4* 17.8* 19.1* 24.4*   NEUTSPOLYS 71.70 66.90 82.30*  --   --   --    LYMPHOCYTES 19.00* 20.80* 9.90*  --   --   --    MONOCYTES 5.80 7.40 5.40  --   --   --    EOSINOPHILS 2.40 3.70 1.10  --   --   --    BASOPHILS 0.70 0.90 0.50  --   --   --    ASTSGOT 17  --   --   --  17  --    ALTSGPT 13  --   --   --  10  --    ALKPHOSPHAT 81  --   --   --  48  --    TBILIRUBIN 0.2  --   --   --  0.6  --        Recent Labs     11/01/22  1255 11/01/22  2308 11/02/22  2313 11/03/22  0228 11/03/22  0830   RBC 3.88*   < > 3.49* 3.47* 3.68*   HEMOGLOBIN 12.0   < > 10.6* 10.4* 11.4*   HEMATOCRIT 37.1   < > 31.7* 30.8* 33.4*   PLATELETCT 270   < > 207 201 229   PROTHROMBTM 13.8  --   --   --   --    APTT <20.0*  --   --   --   --    INR 1.07  --   --   --   --    IRON 58  --   --   --   --    FERRITIN 36.3  --   --   --   --    TOTIRONBC 277  --   --   --   --     < > = values in this interval not displayed.       Recent Results (from the past 24 hour(s))   CBC WITH DIFFERENTIAL    Collection Time: 11/02/22 10:31 AM   Result Value Ref Range    WBC 11.6 (H) 4.8 - 10.8 K/uL    RBC 3.51 (L) 4.20 - 5.40 M/uL    Hemoglobin 10.9 (L) 12.0 - 16.0 g/dL    Hematocrit 34.3 (L) 37.0 - 47.0 %    MCV 97.7 81.4 - 97.8 fL    MCH 31.1 27.0 - 33.0 pg    MCHC 31.8 (L) 33.6 - 35.0 g/dL    RDW 53.9 (H) 35.9 - 50.0 fL    Platelet Count 308 164 - 446 K/uL    MPV 10.4 9.0 - 12.9 fL    Neutrophils-Polys 66.90 44.00 - 72.00 %    Lymphocytes 20.80 (L) 22.00 - 41.00 %    Monocytes 7.40 0.00 - 13.40 %    Eosinophils 3.70 0.00 - 6.90 %    Basophils 0.90 0.00 - 1.80 %    Immature Granulocytes 0.30 0.00 - 0.90 %    Nucleated RBC 0.00 /100 WBC    Neutrophils (Absolute) 7.73 (H) 2.00 - 7.15 K/uL    Lymphs (Absolute) 2.41 1.00 - 4.80 K/uL    Monos  (Absolute) 0.86 (H) 0.00 - 0.85 K/uL    Eos (Absolute) 0.43 0.00 - 0.51 K/uL    Baso (Absolute) 0.10 0.00 - 0.12 K/uL    Immature Granulocytes (abs) 0.04 0.00 - 0.11 K/uL    NRBC (Absolute) 0.00 K/uL   MAGNESIUM    Collection Time: 11/02/22 10:31 AM   Result Value Ref Range    Magnesium 2.1 1.5 - 2.5 mg/dL   Basic Metabolic Panel    Collection Time: 11/02/22 10:31 AM   Result Value Ref Range    Sodium 140 135 - 145 mmol/L    Potassium 4.2 3.6 - 5.5 mmol/L    Chloride 110 96 - 112 mmol/L    Co2 20 20 - 33 mmol/L    Glucose 91 65 - 99 mg/dL    Bun 17 8 - 22 mg/dL    Creatinine 0.44 (L) 0.50 - 1.40 mg/dL    Calcium 8.6 8.5 - 10.5 mg/dL    Anion Gap 10.0 7.0 - 16.0   LACTIC ACID    Collection Time: 11/02/22 10:31 AM   Result Value Ref Range    Lactic Acid 1.0 0.5 - 2.0 mmol/L   ESTIMATED GFR    Collection Time: 11/02/22 10:31 AM   Result Value Ref Range    GFR (CKD-EPI) 103 >60 mL/min/1.73 m 2   CBC WITH DIFFERENTIAL    Collection Time: 11/02/22  1:40 PM   Result Value Ref Range    WBC 21.4 (H) 4.8 - 10.8 K/uL    RBC 3.22 (L) 4.20 - 5.40 M/uL    Hemoglobin 9.9 (L) 12.0 - 16.0 g/dL    Hematocrit 31.7 (L) 37.0 - 47.0 %    MCV 98.4 (H) 81.4 - 97.8 fL    MCH 30.7 27.0 - 33.0 pg    MCHC 31.2 (L) 33.6 - 35.0 g/dL    RDW 54.6 (H) 35.9 - 50.0 fL    Platelet Count 344 164 - 446 K/uL    MPV 10.7 9.0 - 12.9 fL    Neutrophils-Polys 82.30 (H) 44.00 - 72.00 %    Lymphocytes 9.90 (L) 22.00 - 41.00 %    Monocytes 5.40 0.00 - 13.40 %    Eosinophils 1.10 0.00 - 6.90 %    Basophils 0.50 0.00 - 1.80 %    Immature Granulocytes 0.80 0.00 - 0.90 %    Nucleated RBC 0.00 /100 WBC    Neutrophils (Absolute) 17.60 (H) 2.00 - 7.15 K/uL    Lymphs (Absolute) 2.11 1.00 - 4.80 K/uL    Monos (Absolute) 1.16 (H) 0.00 - 0.85 K/uL    Eos (Absolute) 0.23 0.00 - 0.51 K/uL    Baso (Absolute) 0.11 0.00 - 0.12 K/uL    Immature Granulocytes (abs) 0.17 (H) 0.00 - 0.11 K/uL    NRBC (Absolute) 0.00 K/uL   HEMOGLOBIN AND HEMATOCRIT    Collection Time: 11/02/22  4:32  PM   Result Value Ref Range    Hemoglobin 7.1 (L) 12.0 - 16.0 g/dL    Hematocrit 22.7 (L) 37.0 - 47.0 %   IONIZED CALCIUM    Collection Time: 11/02/22  6:34 PM   Result Value Ref Range    Ionized Calcium 1.1 1.1 - 1.3 mmol/L   PLATELET MAPPING WITH BASIC TEG    Collection Time: 11/02/22  6:34 PM   Result Value Ref Range    Reaction Time Initial-R 4.4 (L) 4.6 - 9.1 min    React Time Initial Hep 4.9 4.3 - 8.3 min    Clot Kinetics-K 1.0 0.8 - 2.1 min    Clot Angle-Angle 75.4 63.0 - 78.0 degrees    Maximum Clot Strength-MA 64.7 52.0 - 69.0 mm    TEG Functional Fibrinogen(MA) 24.2 15.0 - 32.0 mm    Lysis 30 minutes-LY30 0.0 0.0 - 2.6 %    % Inhibition ADP 0.0 0.0 - 17.0 %    % Inhibition AA 25.6 (H) 0.0 - 11.0 %    TEG Algorithm Link Algorithm    TSH WITH REFLEX TO FT4    Collection Time: 11/02/22  6:34 PM   Result Value Ref Range    TSH 0.410 0.380 - 5.330 uIU/mL   Basic Metabolic Panel    Collection Time: 11/02/22  6:34 PM   Result Value Ref Range    Sodium 140 135 - 145 mmol/L    Potassium 4.2 3.6 - 5.5 mmol/L    Chloride 110 96 - 112 mmol/L    Co2 17 (L) 20 - 33 mmol/L    Glucose 148 (H) 65 - 99 mg/dL    Bun 17 8 - 22 mg/dL    Creatinine 0.34 (L) 0.50 - 1.40 mg/dL    Calcium 7.4 (L) 8.5 - 10.5 mg/dL    Anion Gap 13.0 7.0 - 16.0   ESTIMATED GFR    Collection Time: 11/02/22  6:34 PM   Result Value Ref Range    GFR (CKD-EPI) 109 >60 mL/min/1.73 m 2   CBC WITHOUT DIFFERENTIAL    Collection Time: 11/02/22 11:13 PM   Result Value Ref Range    WBC 17.8 (H) 4.8 - 10.8 K/uL    RBC 3.49 (L) 4.20 - 5.40 M/uL    Hemoglobin 10.6 (L) 12.0 - 16.0 g/dL    Hematocrit 31.7 (L) 37.0 - 47.0 %    MCV 90.8 81.4 - 97.8 fL    MCH 30.4 27.0 - 33.0 pg    MCHC 33.4 (L) 33.6 - 35.0 g/dL    RDW 54.2 (H) 35.9 - 50.0 fL    Platelet Count 207 164 - 446 K/uL    MPV 10.9 9.0 - 12.9 fL   POCT arterial blood gas device results    Collection Time: 11/02/22 11:13 PM   Result Value Ref Range    Ph 7.372 (L) 7.400 - 7.500    Pco2 25.2 (L) 26.0 - 37.0 mmHg     Po2 60 (L) 64 - 87 mmHg    Tco2 15 (L) 20 - 33 mmol/L    S02 91 (L) 93 - 99 %    Hco3 14.6 (L) 17.0 - 25.0 mmol/L    BE -9 (L) -4 - 3 mmol/L    Body Temp see below degrees    Specimen Arterial    POCT sodium device results    Collection Time: 11/02/22 11:13 PM   Result Value Ref Range    Istat Sodium 143 135 - 145 mmol/L   POCT potassium device results    Collection Time: 11/02/22 11:13 PM   Result Value Ref Range    Istat Potassium 4.1 3.6 - 5.5 mmol/L   POCT ionized CA device results    Collection Time: 11/02/22 11:13 PM   Result Value Ref Range    Istat Ionized Calcium 1.10 1.10 - 1.30 mmol/L   CBC WITHOUT DIFFERENTIAL    Collection Time: 11/03/22  2:28 AM   Result Value Ref Range    WBC 19.1 (H) 4.8 - 10.8 K/uL    RBC 3.47 (L) 4.20 - 5.40 M/uL    Hemoglobin 10.4 (L) 12.0 - 16.0 g/dL    Hematocrit 30.8 (L) 37.0 - 47.0 %    MCV 88.8 81.4 - 97.8 fL    MCH 30.0 27.0 - 33.0 pg    MCHC 33.8 33.6 - 35.0 g/dL    RDW 51.7 (H) 35.9 - 50.0 fL    Platelet Count 201 164 - 446 K/uL    MPV 10.9 9.0 - 12.9 fL   Comp Metabolic Panel    Collection Time: 11/03/22  2:28 AM   Result Value Ref Range    Sodium 139 135 - 145 mmol/L    Potassium 4.2 3.6 - 5.5 mmol/L    Chloride 111 96 - 112 mmol/L    Co2 17 (L) 20 - 33 mmol/L    Anion Gap 11.0 7.0 - 16.0    Glucose 136 (H) 65 - 99 mg/dL    Bun 14 8 - 22 mg/dL    Creatinine 0.36 (L) 0.50 - 1.40 mg/dL    Calcium 7.9 (L) 8.5 - 10.5 mg/dL    AST(SGOT) 17 12 - 45 U/L    ALT(SGPT) 10 2 - 50 U/L    Alkaline Phosphatase 48 30 - 99 U/L    Total Bilirubin 0.6 0.1 - 1.5 mg/dL    Albumin 2.4 (L) 3.2 - 4.9 g/dL    Total Protein 4.4 (L) 6.0 - 8.2 g/dL    Globulin 2.0 1.9 - 3.5 g/dL    A-G Ratio 1.2 g/dL   ESTIMATED GFR    Collection Time: 11/03/22  2:28 AM   Result Value Ref Range    GFR (CKD-EPI) 108 >60 mL/min/1.73 m 2   CBC WITHOUT DIFFERENTIAL    Collection Time: 11/03/22  8:30 AM   Result Value Ref Range    WBC 24.4 (H) 4.8 - 10.8 K/uL    RBC 3.68 (L) 4.20 - 5.40 M/uL    Hemoglobin 11.4 (L)  12.0 - 16.0 g/dL    Hematocrit 33.4 (L) 37.0 - 47.0 %    MCV 90.8 81.4 - 97.8 fL    MCH 31.0 27.0 - 33.0 pg    MCHC 34.1 33.6 - 35.0 g/dL    RDW 55.3 (H) 35.9 - 50.0 fL    Platelet Count 229 164 - 446 K/uL    MPV 11.2 9.0 - 12.9 fL       Radiology Review:  CTA ABDOMEN PELVIS W & W/O POST PROCESS   Final Result         1.  Normal aorta without visualized aneurysm or dissection.   2.  No intraluminal contrast identified to indicate source of GI bleed   3.  Small fat-containing umbilical hernia   4.  Coarse calcification in the left inguinal ring, of uncertain significance, could represent changes related to prior hernia repair   5.  Diverticulosis      DX-CHEST-PORTABLE (1 VIEW)   Final Result      No acute cardiac or pulmonary abnormalities are identified.      DX-CHEST-PORTABLE (1 VIEW)   Final Result      No acute cardiopulmonary abnormality.         CTA ABDOMEN PELVIS W & W/O POST PROCESS   Final Result      1.  No evidence of active arterial gastrointestinal bleeding.   2.  Colonic diverticulosis. No diverticulitis.   3.  A 4.5 cm right posterior rectocele.                  MDM (Data Review):   -Records reviewed and summarized in current documentation  -I personally reviewed and interpreted the laboratory results  -I personally reviewed the radiology images    Assessment/Recommendations:  Patient with multiple bouts of bloody stools yesterday, unable to undergo colonoscopy, and transferred to ICU. CTA abdomen negative for active GI bleeding, Hgb 11.4 this morning after receiving 4 units PRBCs total.   Patient likely has a diverticula that is intermittently bleeding. She has had this in the past per her . He is aware that she may or may not re-bleed as this has been the case in the past.   Options going forward include monitoring patient for active bleeding, may do tagged bleeding scan if she were to develop active bleeding, or may re-try bowel prep for colonoscopy. Risks/benefits of each option discussed.    states he would like to monitor for resolution of bleeding at this time.  He is agreeable to performing bleeding scan if needed.      LGI bleed: Differential includes diverticular, hemorrhoidal, AVMs, brisk upper GI bleed (unlikely based on hemodynamic stability)  - Maintain 2 large bore IVs  - Active type and screen  - Recommend fluid resusc w/ goal of normalizing heart rate  - Monitor H/H. Hgb goal >7 (>9 in setting of active chest pain), INR <2, plat >50 with active bleeding.  - Maintain NPO status    Core Quality Measures   Reviewed items::  Labs, Medications and Radiology reports reviewed

## 2022-11-03 NOTE — ASSESSMENT & PLAN NOTE
-Continue home thyroid replacement   Called patient and advised of his labs, to call and arrange for PCP appt

## 2022-11-03 NOTE — PROGRESS NOTES
Critical Care Progress Note    Date of admission  11/1/2022    Chief Complaint  BRBPR    Hospital Course  Jayshree Reinoso is a 72-year-old female with PMH significant for dementia, hypothyroidism, and previous GI bleed 2019 (due to diverticula) who presented 11/1/2022 with syncopal episode.  She was having lunch with her  when she slumped over and was unresponsive for 25 to 30 seconds.  Hgb was 12 on admission.  CTA abdomen negative for any active bleeding.  She was admitted to the floor for serial hemoglobins.  GI was consulted and attempted colonoscopy on 11/2, however patient became hypotensive, tachycardic, cold and pale.  Stat hemoglobin was 7.1 down from 9.9.  Procedure was aborted and patient received 2 units PRBCs.  GI recommended repeat CTA which again did not identify source of bleeding.    Interval Problem Update  No acute events overnight  Hgb trend: 10.9 -9.9 -7.1 -10.6 -10.4.  Total of 4 units RBCs given thus far  T-max 99.1  HR  Sinus tach  SBP 100s to 120s  NPO  A/O x 1 (baseline).  Moving all extremities, following commands.  Behaviors appropriate  Denies pain  I/O: 7100/0 - Bladder scan > 700 - wilkes placed  PPI, DVT prophylaxis contraindicated  Mobility 2    No longer requires ICU care.  Transfer to Medical floor.  Discussed with Hospitalist, Dr. Goetz, who will assume care.  Critical care service is available for any questions or concerns.    Review of Systems  Review of Systems   Unable to perform ROS: Mental acuity      Vital Signs for last 24 hours   Temp:  [36.2 °C (97.2 °F)-37.3 °C (99.1 °F)] 37.2 °C (98.9 °F)  Pulse:  [] 124  Resp:  [13-98] 22  BP: ()/(50-84) 109/82  SpO2:  [76 %-100 %] 92 %    Hemodynamic parameters for last 24 hours       Respiratory Information for the last 24 hours       Physical Exam   Physical Exam  Vitals and nursing note reviewed.   Constitutional:       General: She is not in acute distress.     Appearance: She is not toxic-appearing.   HENT:       Head: Normocephalic.      Mouth/Throat:      Mouth: Mucous membranes are moist.   Eyes:      General:         Right eye: No discharge.         Left eye: No discharge.   Cardiovascular:      Rate and Rhythm: Normal rate and regular rhythm.   Pulmonary:      Effort: Pulmonary effort is normal. No respiratory distress.      Breath sounds: No wheezing or rales.   Abdominal:      Palpations: Abdomen is soft.      Tenderness: There is no abdominal tenderness. There is no guarding or rebound.   Musculoskeletal:      Cervical back: Neck supple.      Right lower leg: Edema present.      Left lower leg: Edema present.   Skin:     General: Skin is warm and dry.      Coloration: Skin is pale.   Neurological:      Mental Status: She is alert.      Comments: Oriented to self only. Follows commands and answers questions appropriately       Medications  Current Facility-Administered Medications   Medication Dose Route Frequency Provider Last Rate Last Admin    pantoprazole (Protonix) injection 40 mg  40 mg Intravenous BID Cooper Sutherland M.D.   40 mg at 11/03/22 0509    lactated ringers infusion   Intravenous Continuous Mitra L. Latona 100 mL/hr at 11/03/22 0123 New Bag at 11/03/22 0123    ondansetron (ZOFRAN) syringe/vial injection 4 mg  4 mg Intravenous Q6HRS PRN Cooper Sutherland M.D.   4 mg at 11/02/22 1901    levothyroxine (SYNTHROID) tablet 88 mcg  88 mcg Oral AM ES Edith Mae M.D.   88 mcg at 11/02/22 0519    senna-docusate (PERICOLACE or SENOKOT S) 8.6-50 MG per tablet 2 Tablet  2 Tablet Oral BID Edith Mae M.D.   2 Tablet at 11/02/22 0519    And    polyethylene glycol/lytes (MIRALAX) PACKET 1 Packet  1 Packet Oral QDAY PRN Edith Mae M.D.        And    magnesium hydroxide (MILK OF MAGNESIA) suspension 30 mL  30 mL Oral QDAY PRN Edith Mae M.D.        And    bisacodyl (DULCOLAX) suppository 10 mg  10 mg Rectal QDAY PRN Edith Mae M.D.        acetaminophen (Tylenol) tablet 650 mg   650 mg Oral Q6HRS MARKY Mae M.D.           Fluids    Intake/Output Summary (Last 24 hours) at 11/3/2022 1342  Last data filed at 11/3/2022 0800  Gross per 24 hour   Intake 7343.78 ml   Output 0 ml   Net 7343.78 ml       Laboratory  Recent Labs     11/02/22 2313   ISTATAPH 7.372*   ISTATAPCO2 25.2*   ISTATAPO2 60*   ISTATATCO2 15*   GSMOAEI8GMK 91*   ISTATARTHCO3 14.6*   ISTATARTBE -9*   ISTATTEMP see below   ISTATSPEC Arterial         Recent Labs     11/02/22  1031 11/02/22  1834 11/03/22 0228   SODIUM 140 140 139   POTASSIUM 4.2 4.2 4.2   CHLORIDE 110 110 111   CO2 20 17* 17*   BUN 17 17 14   CREATININE 0.44* 0.34* 0.36*   MAGNESIUM 2.1  --   --    CALCIUM 8.6 7.4* 7.9*     Recent Labs     11/01/22  1255 11/02/22  1031 11/02/22  1834 11/03/22 0228   ALTSGPT 13  --   --  10   ASTSGOT 17  --   --  17   ALKPHOSPHAT 81  --   --  48   TBILIRUBIN 0.2  --   --  0.6   LIPASE 17  --   --   --    GLUCOSE 123* 91 148* 136*     Recent Labs     11/01/22  1255 11/02/22  1031 11/02/22  1340 11/02/22  2313 11/03/22 0228 11/03/22  0830   WBC 11.3* 11.6* 21.4* 17.8* 19.1* 24.4*   NEUTSPOLYS 71.70 66.90 82.30*  --   --   --    LYMPHOCYTES 19.00* 20.80* 9.90*  --   --   --    MONOCYTES 5.80 7.40 5.40  --   --   --    EOSINOPHILS 2.40 3.70 1.10  --   --   --    BASOPHILS 0.70 0.90 0.50  --   --   --    ASTSGOT 17  --   --   --  17  --    ALTSGPT 13  --   --   --  10  --    ALKPHOSPHAT 81  --   --   --  48  --    TBILIRUBIN 0.2  --   --   --  0.6  --      Recent Labs     11/01/22  1255 11/01/22 2308 11/02/22  2313 11/03/22  0228 11/03/22  0830   RBC 3.88*   < > 3.49* 3.47* 3.68*   HEMOGLOBIN 12.0   < > 10.6* 10.4* 11.4*   HEMATOCRIT 37.1   < > 31.7* 30.8* 33.4*   PLATELETCT 270   < > 207 201 229   PROTHROMBTM 13.8  --   --   --   --    APTT <20.0*  --   --   --   --    INR 1.07  --   --   --   --    IRON 58  --   --   --   --    FERRITIN 36.3  --   --   --   --    TOTIRONBC 277  --   --   --   --     < > = values in  this interval not displayed.       Imaging  EKG:  I have personally reviewed the images and compared with prior images.  CT:    Reviewed    Assessment/Plan  * GI bleeding- (present on admission)  Assessment & Plan  -History of due to diverticuli  -Last BM evening of 11/2 was bright red blood  -GI attempted colonoscopy on 11/2, however aborted due to instability  -Continue to trend hemoglobin  -If patient develops acute bleed again, GI will consider colonoscopy, otherwise monitor for now  -CTA identified no source of bleeding  -Continue Protonix    Leukocytosis  Assessment & Plan  -Suspect reactive  -Afebrile, no indications of of infection/sepsis  -Monitor    Hemorrhagic shock (HCC)- (present on admission)  Assessment & Plan  -Resolved  -Status post 4 units RBC transfusion this hospitalization    Dementia (HCC)- (present on admission)  Assessment & Plan  -History of  -Mentation currently at her baseline.  Oriented to self only without behavioral disturbance  -Sleep hygiene  -Frequent reorientation    Hypothyroid- (present on admission)  Assessment & Plan  -Continue home thyroid replacement       VTE:  Contraindicated  Ulcer: PPI  Lines: Lloyd Catheter  placed today.    I have performed a physical exam and reviewed and updated ROS and Plan today (11/3/2022). In review of yesterday's note (11/2/2022), there are no changes except as documented above.     Discussed patient condition and risk of morbidity and/or mortality with RN, Patient, and my attending Dr. Gonda    Please note that this dictation was created using voice recognition software. I have made every reasonable attempt to correct obvious errors, but there may be errors of grammar and possibly content that I did not discover before finalizing the note.    TRISH Brown.

## 2022-11-03 NOTE — HOSPITAL COURSE
Jayshree Reinoso is a 72-year-old female with PMH significant for dementia, hypothyroidism, and previous GI bleed 2019 (due to diverticula) who presented 11/1/2022 with syncopal episode.  She was having lunch with her  when she slumped over and was unresponsive for 25 to 30 seconds.  Hgb was 12 on admission.  CTA abdomen negative for any active bleeding.  She was admitted to the floor for serial hemoglobins.  GI was consulted and attempted colonoscopy on 11/2, however patient became hypotensive, tachycardic, cold and pale.  Stat hemoglobin was 7.1 down from 9.9.  Procedure was aborted and patient received 2 units PRBCs.  GI recommended repeat CTA which again did not identify source of bleeding.

## 2022-11-03 NOTE — PROGRESS NOTES
Hospital Medicine Daily Progress Note    Date of Service  11/3/2022    Chief Complaint  Jayshree Reinoso is a 72 y.o. female admitted 11/1/2022 with GIB    Hospital Course  72-year-old female with history of dementia, hypothyroidism and GI bleeding who presented 11/1 with lower GI bleeding.  During her lunch on the day of admission patient fainted and was unresponsive for seconds.  Her family noticed blood coming down from her rectum.  On admission CTA for abdomen did not show any active bleeding.  GI was consulted. She had syncope in the hospital and Hgb dropped 9.9 to 7.1 and she was transferred to ICU for transfusions. Repeat CTA did not show bleeding. GI continues to follow, colonoscopy was canceled for now and recommends staying NPO.    Interval Problem Update  She feels well, denies abd pain, nausea, weakness   bedside and updated  Last BM last night and bloody. Hgb so far stable    I have discussed this patient's plan of care and discharge plan at IDT rounds today with Case Management, Nursing, Nursing leadership, and other members of the IDT team.    Consultants/Specialty  critical care and GI    Code Status  Full Code    Disposition  Patient is not medically cleared for discharge.   Anticipate discharge to to home with organized home healthcare and close outpatient follow-up.  I have placed the appropriate orders for post-discharge needs.    Review of Systems  Review of Systems   Constitutional:  Negative for malaise/fatigue.   Respiratory:  Negative for shortness of breath.    Cardiovascular:  Negative for chest pain.   Gastrointestinal:  Negative for abdominal pain and nausea.   Musculoskeletal:  Negative for myalgias.   Neurological:  Negative for dizziness and weakness.   Psychiatric/Behavioral:  Positive for memory loss.    All other systems reviewed and are negative.     Physical Exam  Temp:  [36.2 °C (97.2 °F)-37.3 °C (99.1 °F)] 36.5 °C (97.7 °F)  Pulse:  [] 68  Resp:  [13-98] 22  BP:  ()/(50-84) 115/65  SpO2:  [76 %-100 %] 90 %    Physical Exam  Vitals and nursing note reviewed.   Constitutional:       General: She is not in acute distress.     Appearance: She is not toxic-appearing.   HENT:      Head: Normocephalic.      Mouth/Throat:      Mouth: Mucous membranes are moist.   Eyes:      General:         Right eye: No discharge.         Left eye: No discharge.   Cardiovascular:      Rate and Rhythm: Normal rate and regular rhythm.   Pulmonary:      Effort: Pulmonary effort is normal. No respiratory distress.      Breath sounds: No wheezing or rales.   Abdominal:      Palpations: Abdomen is soft.      Tenderness: There is no abdominal tenderness. There is no guarding or rebound.   Musculoskeletal:      Cervical back: Neck supple.      Right lower leg: Edema present.      Left lower leg: Edema present.   Skin:     General: Skin is warm and dry.   Neurological:      Mental Status: She is alert.      Comments: Oriented to self only. Follows commands and answers questions appropriately       Fluids    Intake/Output Summary (Last 24 hours) at 11/3/2022 1213  Last data filed at 11/3/2022 0800  Gross per 24 hour   Intake 7343.78 ml   Output 0 ml   Net 7343.78 ml       Laboratory  Recent Labs     11/02/22  2313 11/03/22  0228 11/03/22  0830   WBC 17.8* 19.1* 24.4*   RBC 3.49* 3.47* 3.68*   HEMOGLOBIN 10.6* 10.4* 11.4*   HEMATOCRIT 31.7* 30.8* 33.4*   MCV 90.8 88.8 90.8   MCH 30.4 30.0 31.0   MCHC 33.4* 33.8 34.1   RDW 54.2* 51.7* 55.3*   PLATELETCT 207 201 229   MPV 10.9 10.9 11.2     Recent Labs     11/02/22  1031 11/02/22  1834 11/03/22  0228   SODIUM 140 140 139   POTASSIUM 4.2 4.2 4.2   CHLORIDE 110 110 111   CO2 20 17* 17*   GLUCOSE 91 148* 136*   BUN 17 17 14   CREATININE 0.44* 0.34* 0.36*   CALCIUM 8.6 7.4* 7.9*     Recent Labs     11/01/22  1255   APTT <20.0*   INR 1.07               Imaging  CTA ABDOMEN PELVIS W & W/O POST PROCESS   Final Result         1.  Normal aorta without visualized  aneurysm or dissection.   2.  No intraluminal contrast identified to indicate source of GI bleed   3.  Small fat-containing umbilical hernia   4.  Coarse calcification in the left inguinal ring, of uncertain significance, could represent changes related to prior hernia repair   5.  Diverticulosis      DX-CHEST-PORTABLE (1 VIEW)   Final Result      No acute cardiac or pulmonary abnormalities are identified.      DX-CHEST-PORTABLE (1 VIEW)   Final Result      No acute cardiopulmonary abnormality.         CTA ABDOMEN PELVIS W & W/O POST PROCESS   Final Result      1.  No evidence of active arterial gastrointestinal bleeding.   2.  Colonic diverticulosis. No diverticulitis.   3.  A 4.5 cm right posterior rectocele.                 Assessment/Plan  * GI bleeding- (present on admission)  Assessment & Plan  Patient has history of diverticulitis bleeding last in 2019   came with painless GI bleeding and syncope  Last colonoscopy in 2016, no family history of colon cancer.  CTA for abdomen did not show any active bleeding  On PPI BID  Hemoglobin every 8 hours and blood transfusion if less than 7  GI consulted, possible diverticular bleed  No colonoscopy planned  If rebleeding, will consider RBC scan  NPO today and reeva tomorrow    Hemorrhagic shock (HCC)- (present on admission)  Assessment & Plan  Resolved    Vasovagal syncope- (present on admission)  Assessment & Plan  Related to acute GI bleed/acute blood loss anemia     Plan to get a stat CBC and transfuse PRBCs if hemoglobin less than 7    Start LR bolus of 500 cc x 1    Dizziness- (present on admission)  Assessment & Plan  Likely related to GI bleed   Monitor her hb       Dementia (HCC)- (present on admission)  Assessment & Plan  Moderate to advanced  On admission patient is alert to herself and age only, around her baseline  lives with her family  Continue nonpharmacological treatment to avoid delirium  Avoid benzo and antihistamine  Full code at this  time.    Hypothyroid- (present on admission)  Assessment & Plan  Continue home dose of levothyroxine 100 mcg daily       VTE prophylaxis: SCDs/TEDs    I have performed a physical exam and reviewed and updated ROS and Plan today (11/3/2022). In review of yesterday's note (11/2/2022), there are no changes except as documented above.

## 2022-11-04 ENCOUNTER — APPOINTMENT (OUTPATIENT)
Dept: RADIOLOGY | Facility: MEDICAL CENTER | Age: 72
DRG: 377 | End: 2022-11-04
Attending: NURSE PRACTITIONER
Payer: COMMERCIAL

## 2022-11-04 LAB
ALBUMIN SERPL BCP-MCNC: 2.3 G/DL (ref 3.2–4.9)
ALBUMIN/GLOB SERPL: 1.2 G/DL
ALP SERPL-CCNC: 41 U/L (ref 30–99)
ALT SERPL-CCNC: 8 U/L (ref 2–50)
ANION GAP SERPL CALC-SCNC: 11 MMOL/L (ref 7–16)
APTT PPP: 21.7 SEC (ref 24.7–36)
AST SERPL-CCNC: 17 U/L (ref 12–45)
BASOPHILS # BLD AUTO: 0.5 % (ref 0–1.8)
BASOPHILS # BLD: 0.08 K/UL (ref 0–0.12)
BILIRUB SERPL-MCNC: 0.3 MG/DL (ref 0.1–1.5)
BUN SERPL-MCNC: 17 MG/DL (ref 8–22)
CALCIUM SERPL-MCNC: 7.6 MG/DL (ref 8.5–10.5)
CHLORIDE SERPL-SCNC: 114 MMOL/L (ref 96–112)
CO2 SERPL-SCNC: 20 MMOL/L (ref 20–33)
CREAT SERPL-MCNC: 0.5 MG/DL (ref 0.5–1.4)
EOSINOPHIL # BLD AUTO: 0.07 K/UL (ref 0–0.51)
EOSINOPHIL NFR BLD: 0.5 % (ref 0–6.9)
ERYTHROCYTE [DISTWIDTH] IN BLOOD BY AUTOMATED COUNT: 56.4 FL (ref 35.9–50)
GFR SERPLBLD CREATININE-BSD FMLA CKD-EPI: 99 ML/MIN/1.73 M 2
GLOBULIN SER CALC-MCNC: 2 G/DL (ref 1.9–3.5)
GLUCOSE SERPL-MCNC: 105 MG/DL (ref 65–99)
HCT VFR BLD AUTO: 21.4 % (ref 37–47)
HCT VFR BLD AUTO: 24.4 % (ref 37–47)
HCT VFR BLD AUTO: 29.2 % (ref 37–47)
HGB BLD-MCNC: 7.1 G/DL (ref 12–16)
HGB BLD-MCNC: 8.1 G/DL (ref 12–16)
HGB BLD-MCNC: 9.9 G/DL (ref 12–16)
IMM GRANULOCYTES # BLD AUTO: 0.16 K/UL (ref 0–0.11)
IMM GRANULOCYTES NFR BLD AUTO: 1.1 % (ref 0–0.9)
INR PPP: 1.17 (ref 0.87–1.13)
LYMPHOCYTES # BLD AUTO: 2.64 K/UL (ref 1–4.8)
LYMPHOCYTES NFR BLD: 17.9 % (ref 22–41)
MCH RBC QN AUTO: 30.6 PG (ref 27–33)
MCHC RBC AUTO-ENTMCNC: 33.2 G/DL (ref 33.6–35)
MCV RBC AUTO: 92.2 FL (ref 81.4–97.8)
MONOCYTES # BLD AUTO: 1.81 K/UL (ref 0–0.85)
MONOCYTES NFR BLD AUTO: 12.3 % (ref 0–13.4)
NEUTROPHILS # BLD AUTO: 9.95 K/UL (ref 2–7.15)
NEUTROPHILS NFR BLD: 67.7 % (ref 44–72)
NRBC # BLD AUTO: 0.03 K/UL
NRBC BLD-RTO: 0.2 /100 WBC
PLATELET # BLD AUTO: 196 K/UL (ref 164–446)
PMV BLD AUTO: 10.4 FL (ref 9–12.9)
POTASSIUM SERPL-SCNC: 3.9 MMOL/L (ref 3.6–5.5)
PROT SERPL-MCNC: 4.3 G/DL (ref 6–8.2)
PROTHROMBIN TIME: 14.8 SEC (ref 12–14.6)
RBC # BLD AUTO: 2.32 M/UL (ref 4.2–5.4)
SODIUM SERPL-SCNC: 145 MMOL/L (ref 135–145)
WBC # BLD AUTO: 14.7 K/UL (ref 4.8–10.8)

## 2022-11-04 PROCEDURE — P9016 RBC LEUKOCYTES REDUCED: HCPCS

## 2022-11-04 PROCEDURE — 85025 COMPLETE CBC W/AUTO DIFF WBC: CPT

## 2022-11-04 PROCEDURE — 700102 HCHG RX REV CODE 250 W/ 637 OVERRIDE(OP): Performed by: INTERNAL MEDICINE

## 2022-11-04 PROCEDURE — 99233 SBSQ HOSP IP/OBS HIGH 50: CPT | Performed by: INTERNAL MEDICINE

## 2022-11-04 PROCEDURE — 99233 SBSQ HOSP IP/OBS HIGH 50: CPT | Mod: MISDOCU | Performed by: NURSE PRACTITIONER

## 2022-11-04 PROCEDURE — 86923 COMPATIBILITY TEST ELECTRIC: CPT | Mod: 91

## 2022-11-04 PROCEDURE — 36415 COLL VENOUS BLD VENIPUNCTURE: CPT

## 2022-11-04 PROCEDURE — 85018 HEMOGLOBIN: CPT

## 2022-11-04 PROCEDURE — 36430 TRANSFUSION BLD/BLD COMPNT: CPT

## 2022-11-04 PROCEDURE — A9270 NON-COVERED ITEM OR SERVICE: HCPCS | Performed by: INTERNAL MEDICINE

## 2022-11-04 PROCEDURE — C9113 INJ PANTOPRAZOLE SODIUM, VIA: HCPCS | Performed by: INTERNAL MEDICINE

## 2022-11-04 PROCEDURE — 80053 COMPREHEN METABOLIC PANEL: CPT

## 2022-11-04 PROCEDURE — 85014 HEMATOCRIT: CPT

## 2022-11-04 PROCEDURE — 85730 THROMBOPLASTIN TIME PARTIAL: CPT

## 2022-11-04 PROCEDURE — 770020 HCHG ROOM/CARE - TELE (206)

## 2022-11-04 PROCEDURE — A9560 TC99M LABELED RBC: HCPCS

## 2022-11-04 PROCEDURE — 700105 HCHG RX REV CODE 258: Performed by: NURSE PRACTITIONER

## 2022-11-04 PROCEDURE — 85610 PROTHROMBIN TIME: CPT

## 2022-11-04 PROCEDURE — 700111 HCHG RX REV CODE 636 W/ 250 OVERRIDE (IP): Performed by: INTERNAL MEDICINE

## 2022-11-04 PROCEDURE — 700105 HCHG RX REV CODE 258

## 2022-11-04 RX ORDER — PEG-3350, SODIUM SULFATE, SODIUM CHLORIDE, POTASSIUM CHLORIDE, SODIUM ASCORBATE AND ASCORBIC ACID 7.5-2.691G
100 KIT ORAL 2 TIMES DAILY
Status: COMPLETED | OUTPATIENT
Start: 2022-11-04 | End: 2022-11-05

## 2022-11-04 RX ORDER — SODIUM CHLORIDE 9 MG/ML
INJECTION, SOLUTION INTRAVENOUS CONTINUOUS
Status: ACTIVE | OUTPATIENT
Start: 2022-11-04 | End: 2022-11-04

## 2022-11-04 RX ADMIN — PANTOPRAZOLE SODIUM 40 MG: 40 INJECTION, POWDER, FOR SOLUTION INTRAVENOUS at 17:42

## 2022-11-04 RX ADMIN — PEG-3350, SODIUM SULFATE, SODIUM CHLORIDE, POTASSIUM CHLORIDE, SODIUM ASCORBATE AND ASCORBIC ACID 100 G: KIT at 17:42

## 2022-11-04 RX ADMIN — LEVOTHYROXINE SODIUM 88 MCG: 0.09 TABLET ORAL at 05:11

## 2022-11-04 RX ADMIN — PANTOPRAZOLE SODIUM 40 MG: 40 INJECTION, POWDER, FOR SOLUTION INTRAVENOUS at 05:12

## 2022-11-04 RX ADMIN — SODIUM CHLORIDE, POTASSIUM CHLORIDE, SODIUM LACTATE AND CALCIUM CHLORIDE: 600; 310; 30; 20 INJECTION, SOLUTION INTRAVENOUS at 09:29

## 2022-11-04 RX ADMIN — SENNOSIDES AND DOCUSATE SODIUM 2 TABLET: 50; 8.6 TABLET ORAL at 05:11

## 2022-11-04 RX ADMIN — SODIUM CHLORIDE: 9 INJECTION, SOLUTION INTRAVENOUS at 04:40

## 2022-11-04 ASSESSMENT — ENCOUNTER SYMPTOMS
WEAKNESS: 0
MEMORY LOSS: 1
MYALGIAS: 0
VOMITING: 0
DIZZINESS: 0
BLOOD IN STOOL: 1
ABDOMINAL PAIN: 0
NAUSEA: 0
SHORTNESS OF BREATH: 0

## 2022-11-04 ASSESSMENT — PAIN DESCRIPTION - PAIN TYPE
TYPE: ACUTE PAIN

## 2022-11-04 ASSESSMENT — FIBROSIS 4 INDEX: FIB4 SCORE: 2.21

## 2022-11-04 NOTE — PROGRESS NOTES
Report received, pt care assumed, tele box on. VSS, pt assessment complete. Pt aaox1, no signs of distress noted at this time. Pt denies any additional needs at this time. Bed in lowest position, bed alarm on, pt educated on fall risk and verbalized understanding, call light within reach.

## 2022-11-04 NOTE — PROGRESS NOTES
Hospital Medicine Daily Progress Note    Date of Service  11/4/2022    Chief Complaint  Jayshree Reinoso is a 72 y.o. female admitted 11/1/2022 with GIB    Hospital Course  72-year-old female with history of dementia, hypothyroidism and GI bleeding who presented 11/1 with lower GI bleeding.  During her lunch on the day of admission patient fainted and was unresponsive for seconds.  Her family noticed blood coming down from her rectum.  On admission CTA for abdomen did not show any active bleeding.  GI was consulted. She had syncope in the hospital and Hgb dropped 9.9 to 7.1 and she was transferred to ICU for transfusions. Repeat CTA did not show bleeding. GI continues to follow, colonoscopy was canceled for now and recommends staying NPO.    Interval Problem Update  Patient seen and examined, her hemoglobin dropped again from 10 to 7 early this morning received 1 unit of RBC and her hemoglobin went up to 8 but after patient started having copious amount of blood per rectum. Giving another 2 units orf RBC now.  GI was notified and has ordered a NM Gi-bleed scan and if a bleeding can been seen will discuss with IR for possible embolization otherwise if negative plan is for colonoscopy tomorrow. Cont on IVF.  Cont keeping her NPO     I have discussed this patient's plan of care and discharge plan at IDT rounds today with Case Management, Nursing, Nursing leadership, and other members of the IDT team.    Consultants/Specialty  critical care and GI    Code Status  Full Code    Disposition  Patient is not medically cleared for discharge.   Anticipate discharge to to home with organized home healthcare and close outpatient follow-up.  I have placed the appropriate orders for post-discharge needs.    Review of Systems  Review of Systems   Constitutional:  Negative for malaise/fatigue.   Respiratory:  Negative for shortness of breath.    Cardiovascular:  Negative for chest pain.   Gastrointestinal:  Negative for abdominal pain  and nausea.   Musculoskeletal:  Negative for myalgias.   Neurological:  Negative for dizziness and weakness.   Psychiatric/Behavioral:  Positive for memory loss.    All other systems reviewed and are negative.     Physical Exam  Temp:  [36.5 °C (97.7 °F)-36.8 °C (98.3 °F)] 36.6 °C (97.9 °F)  Pulse:  [] 88  Resp:  [16-18] 16  BP: ()/(53-65) 94/54  SpO2:  [91 %-100 %] 100 %    Physical Exam  Vitals and nursing note reviewed.   Constitutional:       General: She is not in acute distress.     Appearance: She is not toxic-appearing.   HENT:      Head: Normocephalic.      Mouth/Throat:      Mouth: Mucous membranes are moist.   Eyes:      General:         Right eye: No discharge.         Left eye: No discharge.   Cardiovascular:      Rate and Rhythm: Normal rate and regular rhythm.   Pulmonary:      Effort: Pulmonary effort is normal. No respiratory distress.      Breath sounds: No wheezing or rales.   Abdominal:      Palpations: Abdomen is soft.      Tenderness: There is no abdominal tenderness. There is no guarding or rebound.   Musculoskeletal:      Cervical back: Neck supple.      Right lower leg: Edema present.      Left lower leg: Edema present.   Skin:     General: Skin is warm and dry.   Neurological:      Mental Status: She is alert.      Comments: Oriented to self only. Follows commands and answers questions appropriately       Fluids    Intake/Output Summary (Last 24 hours) at 11/4/2022 1303  Last data filed at 11/4/2022 0644  Gross per 24 hour   Intake 346.5 ml   Output 1145 ml   Net -798.5 ml       Laboratory  Recent Labs     11/03/22  0228 11/03/22  0830 11/03/22  1620 11/04/22  0320 11/04/22  1025   WBC 19.1* 24.4*  --  14.7*  --    RBC 3.47* 3.68*  --  2.32*  --    HEMOGLOBIN 10.4* 11.4* 10.3* 7.1* 8.1*   HEMATOCRIT 30.8* 33.4* 30.6* 21.4* 24.4*   MCV 88.8 90.8  --  92.2  --    MCH 30.0 31.0  --  30.6  --    MCHC 33.8 34.1  --  33.2*  --    RDW 51.7* 55.3*  --  56.4*  --    PLATELETCT 201 229   --  196  --    MPV 10.9 11.2  --  10.4  --      Recent Labs     11/02/22  1834 11/03/22  0228 11/04/22  0320   SODIUM 140 139 145   POTASSIUM 4.2 4.2 3.9   CHLORIDE 110 111 114*   CO2 17* 17* 20   GLUCOSE 148* 136* 105*   BUN 17 14 17   CREATININE 0.34* 0.36* 0.50   CALCIUM 7.4* 7.9* 7.6*     Recent Labs     11/04/22  1144   APTT 21.7*   INR 1.17*               Imaging  CTA ABDOMEN PELVIS W & W/O POST PROCESS   Final Result         1.  Normal aorta without visualized aneurysm or dissection.   2.  No intraluminal contrast identified to indicate source of GI bleed   3.  Small fat-containing umbilical hernia   4.  Coarse calcification in the left inguinal ring, of uncertain significance, could represent changes related to prior hernia repair   5.  Diverticulosis      DX-CHEST-PORTABLE (1 VIEW)   Final Result      No acute cardiac or pulmonary abnormalities are identified.      DX-CHEST-PORTABLE (1 VIEW)   Final Result      No acute cardiopulmonary abnormality.         CTA ABDOMEN PELVIS W & W/O POST PROCESS   Final Result      1.  No evidence of active arterial gastrointestinal bleeding.   2.  Colonic diverticulosis. No diverticulitis.   3.  A 4.5 cm right posterior rectocele.            NM-GI BLEEDING SCAN    (Results Pending)        Assessment/Plan  * GI bleeding- (present on admission)  Assessment & Plan  Patient has history of diverticulitis bleeding last in 2019   came with painless GI bleeding and syncope  Last colonoscopy in 2016, no family history of colon cancer.  CTA for abdomen did not show any active bleeding  On PPI BID  Hemoglobin every 8 hours and blood transfusion if less than 7  GI consulted, possible diverticular bleed  No colonoscopy planned  If rebleeding, will consider RBC scan  NPO today and reeva tomorrow    11/4: emoglobin dropped again from 10 to 7 early this mornign received 1 unit of RBC and her hemoglobin went up to 8 but after patient started having copious amount of blood per rectum.  Giving another 2 units orf RBC now.  GI was notified and has ordered a NM Gi-bleed scan and if a bleeding can been seen will discuss with IR for possible embolization otherwise if negative plan is for colonoscopy tomorrow. Cont on IVF.  Cont keeping her NPO     Hemorrhagic shock (HCC)- (present on admission)  Assessment & Plan  Resolved    Vasovagal syncope- (present on admission)  Assessment & Plan  Related to acute GI bleed/acute blood loss anemia     Plan to get a stat CBC and transfuse PRBCs if hemoglobin less than 7    Start LR bolus of 500 cc x 1    Dizziness- (present on admission)  Assessment & Plan  Likely related to GI bleed   Monitor her hb       Dementia (HCC)- (present on admission)  Assessment & Plan  Moderate to advanced  On admission patient is alert to herself and age only, around her baseline  lives with her family  Continue nonpharmacological treatment to avoid delirium  Avoid benzo and antihistamine  Full code at this time.    Hypothyroid- (present on admission)  Assessment & Plan  Continue home dose of levothyroxine 100 mcg daily       VTE prophylaxis: SCDs/TEDs    I have performed a physical exam and reviewed and updated ROS and Plan today (11/4/2022). In review of yesterday's note (11/3/2022), there are no changes except as documented above.

## 2022-11-04 NOTE — PROGRESS NOTES
Gastroenterology Progress Note               Author:  MADONNA Adler Date & Time Created: 11/4/2022 1:22 PM       Patient ID:  Name:             Jayshree Reinoso  YOB: 1950  Age:                 72 y.o.  female  MRN:               [id number]        Medical Decision Making, by Problem:  Active Hospital Problems    Diagnosis     Leukocytosis [D72.829]     Melena [K92.1]     Vasovagal syncope [R55]     Hemorrhagic shock (HCC) [R57.8]     GI bleeding [K92.2]     Dizziness [R42]     Dementia (HCC) [F03.90]     Hypothyroid [E03.9]            Presenting Chief Complaint:  Lower GI bleeding     History of Present Illness: This is a 72 year old female with a past medical [id number] of dementia, hypothyroidism, diverticulosis, prior GI bleeding secondary to diverticulosis admitted 11/1/2022 for lower GI bleeding. Patient was very limited in providing history due to her dementia but her , Per, was at bedside to provide history. He states that the patient was in her normal state of health until prior to arrival. He states they were out at lunch and patient slumped over, he rushed to support her so she didn't fall, and patient was unresponsive for 30-60 seconds. When she awoke, she was complaining of abdominal pain and guarding her abdomen. EMS was called and stated that patient was hypotensive. Upon arrival to the emergency department, patient was noted to have gross amount of blood from rectum going down her legs. Patient reports that she had blood stool this morning only. She, furthermore, reports chronic headache but denies nausea, vomiting, dizziness, headache, shortness of breath, or weakness. She is normotensive. Denies pain. Hgb 12, plt 270, lactic acid 2.4, INR 1.07. BUN/Cr 35.5. CTA abdomen negative fo ractive arterial bleeding, diverticulosis noted but no diverticulitis, and a 4.5cm right posterior rectocele seen.   Of note, patient was admitted in 2016 and 2019 for acute blood loss,  melena, and hematochezia due to GI bleeding. She had EGD and colonoscopy with [facility] provider in 2016 and had repeat colonoscopy in 2019. No active bleeding was seen at that time. Patient did not follow up with GI outpatient since.   Denies current or previous tobacco use, alcohol use or recreational drug use.       Interval History:  11/2/2022: Patient seen at bedside with . Hgb stable. Patient/nursing reports one BM last night with BRBPR. Patient did not complete prep for colonoscopy last night/this morning. Discussed with nursing to do prep today, complete by 1400 and pending patient completion of prep, will plan for colonoscopy today. If unable to complete, will plan for colonoscopy tomorrow.  verbalized understanding, agreeable, and will encourage patient to drink prep.     11/3/2022: Patient with multiple bouts of bloody stools yesterday, became hypotensive, tachycardic, pale, and worsening in mental cognition. Colonoscopy cancelled, patient transferred to ICU. Received a total of 4 units of PRBCs. CTA abdomen negative for bleeding. Hgb 11.4 this morning. Discussed with  and nursing at bedside options for monitoring patient for resolution of bleeding, option of doing a tagged bleeding scan or may re-try cleanse for colonoscopy.  states he would like to monitor at this time due to the fact that studies (CTA and bleeding scan) may not show bleeding if patient is not actively bleeding. He is agreeable to performing bleeding scan if needed but would like to monitor patient at this time.     11/4/2022: Patient seen at bedside with .  Patient AAO x1.  anxious secondary to multiple bouts of BRBPR. Hgb 7.1. Patient received 1 unit PRBCs overnight and has amount of BRBPR on examination.  Stat tagged RBC scan ordered.  Discussed with Dr. Dan.     1130: Updated by nursing that patient continues to have copious amounts of BRBPR.  She is receiving 2 units of PRBCs.  Patient  "to have nuclear med scan tentatively 1230.      Hospital Medications:  Current Facility-Administered Medications   Medication Dose Frequency Provider Last Rate Last Admin    NS infusion   Continuous Linsey M Wegener, A.P.R.N.   Stopped at 11/04/22 0650    pantoprazole (Protonix) injection 40 mg  40 mg BID Cooper Sutherland M.D.   40 mg at 11/04/22 0512    lactated ringers infusion   Continuous Mitra L. Latona 100 mL/hr at 11/04/22 0929 New Bag at 11/04/22 0929    ondansetron (ZOFRAN) syringe/vial injection 4 mg  4 mg Q6HRS PRN Cooper Sutherland M.D.   4 mg at 11/02/22 1901    levothyroxine (SYNTHROID) tablet 88 mcg  88 mcg AM ES Edith Mae M.D.   88 mcg at 11/04/22 0511    senna-docusate (PERICOLACE or SENOKOT S) 8.6-50 MG per tablet 2 Tablet  2 Tablet BID Edith Mae M.D.   2 Tablet at 11/04/22 0511    And    polyethylene glycol/lytes (MIRALAX) PACKET 1 Packet  1 Packet QDAY PRN Edith Mae M.D.        And    magnesium hydroxide (MILK OF MAGNESIA) suspension 30 mL  30 mL QDAY PRVIRGINIA Mae M.D.        And    bisacodyl (DULCOLAX) suppository 10 mg  10 mg QDAY PRN Edith Mae M.D.        acetaminophen (Tylenol) tablet 650 mg  650 mg Q6HRS PRVIRGINIA Mae M.D.       Last reviewed on 11/2/2022  3:22 PM by Alicia Caicedo R.N.       Vital signs:  Weight/BMI: Body mass index is 24.15 kg/m².  BP (!) 94/54   Pulse 88   Temp 36.6 °C (97.9 °F)   Resp 16   Ht 1.624 m (5' 3.94\")   Wt 63.7 kg (140 lb 6.9 oz)   SpO2 100%   Vitals:    11/04/22 0658 11/04/22 1044 11/04/22 1146 11/04/22 1203   BP: 100/58 102/64 106/56 (!) 94/54   Pulse: 100  73 88   Resp: 16  18 16   Temp: 36.5 °C (97.7 °F)  36.8 °C (98.3 °F) 36.6 °C (97.9 °F)   TempSrc: Temporal      SpO2: 96%  94% 100%   Weight:       Height:         Oxygen Therapy:  Pulse Oximetry: 100 %, O2 (LPM): 2, O2 Delivery Device: Nasal Cannula    Intake/Output Summary (Last 24 hours) at 11/4/2022 1322  Last data filed at 11/4/2022 " 0644  Gross per 24 hour   Intake 346.5 ml   Output 1145 ml   Net -798.5 ml         Review of Systems   Unable to perform ROS: Dementia   Gastrointestinal:  Positive for blood in stool. Negative for abdominal pain, nausea and vomiting.   All other systems reviewed and are negative.        Physical Exam:  Physical Exam  Vitals and nursing note reviewed.   Constitutional:       General: She is not in acute distress.     Appearance: Normal appearance. She is normal weight. She is ill-appearing. She is not toxic-appearing.   HENT:      Head: Normocephalic.      Nose: Nose normal.      Mouth/Throat:      Mouth: Mucous membranes are moist.      Pharynx: Oropharynx is clear.   Eyes:      General: No scleral icterus.     Extraocular Movements: Extraocular movements intact.      Conjunctiva/sclera: Conjunctivae normal.      Pupils: Pupils are equal, round, and reactive to light.   Cardiovascular:      Rate and Rhythm: Normal rate and regular rhythm.      Pulses: Normal pulses.      Heart sounds: Normal heart sounds. No murmur heard.    No gallop.   Pulmonary:      Effort: Pulmonary effort is normal. No respiratory distress.      Breath sounds: Normal breath sounds.   Chest:      Chest wall: No tenderness.   Abdominal:      General: Abdomen is flat. Bowel sounds are normal. There is no distension.      Palpations: Abdomen is soft.      Tenderness: There is no abdominal tenderness. There is no guarding.   Genitourinary:     Comments: copious amount of bright red blood per rectum  Musculoskeletal:      Right lower leg: Edema present.      Left lower leg: Edema present.   Skin:     General: Skin is warm and dry.      Capillary Refill: Capillary refill takes less than 2 seconds.      Coloration: Skin is pale. Skin is not jaundiced.   Neurological:      Mental Status: She is alert. Mental status is at baseline. She is disoriented.      Motor: Weakness present.   Psychiatric:         Mood and Affect: Mood normal.         Behavior:  Behavior normal.           Labs:  Recent Labs     11/02/22  1031 11/02/22  1834 11/03/22 0228 11/04/22  0320   SODIUM 140 140 139 145   POTASSIUM 4.2 4.2 4.2 3.9   CHLORIDE 110 110 111 114*   CO2 20 17* 17* 20   BUN 17 17 14 17   CREATININE 0.44* 0.34* 0.36* 0.50   MAGNESIUM 2.1  --   --   --    CALCIUM 8.6 7.4* 7.9* 7.6*       Recent Labs     11/02/22  1834 11/03/22 0228 11/04/22  0320   ALTSGPT  --  10 8   ASTSGOT  --  17 17   ALKPHOSPHAT  --  48 41   TBILIRUBIN  --  0.6 0.3   GLUCOSE 148* 136* 105*       Recent Labs     11/02/22  1031 11/02/22  1340 11/02/22  2313 11/03/22 0228 11/03/22  0830 11/04/22  0320   WBC 11.6* 21.4*   < > 19.1* 24.4* 14.7*   NEUTSPOLYS 66.90 82.30*  --   --   --  67.70   LYMPHOCYTES 20.80* 9.90*  --   --   --  17.90*   MONOCYTES 7.40 5.40  --   --   --  12.30   EOSINOPHILS 3.70 1.10  --   --   --  0.50   BASOPHILS 0.90 0.50  --   --   --  0.50   ASTSGOT  --   --   --  17  --  17   ALTSGPT  --   --   --  10  --  8   ALKPHOSPHAT  --   --   --  48  --  41   TBILIRUBIN  --   --   --  0.6  --  0.3    < > = values in this interval not displayed.       Recent Labs     11/03/22 0228 11/03/22  0830 11/03/22  1620 11/04/22  0320 11/04/22  1025 11/04/22  1144   RBC 3.47* 3.68*  --  2.32*  --   --    HEMOGLOBIN 10.4* 11.4* 10.3* 7.1* 8.1*  --    HEMATOCRIT 30.8* 33.4* 30.6* 21.4* 24.4*  --    PLATELETCT 201 229  --  196  --   --    PROTHROMBTM  --   --   --   --   --  14.8*   APTT  --   --   --   --   --  21.7*   INR  --   --   --   --   --  1.17*       Recent Results (from the past 24 hour(s))   POCT glucose device results    Collection Time: 11/03/22  1:38 PM   Result Value Ref Range    POC Glucose, Blood 102 (H) 65 - 99 mg/dL   HEMOGLOBIN AND HEMATOCRIT    Collection Time: 11/03/22  4:20 PM   Result Value Ref Range    Hemoglobin 10.3 (L) 12.0 - 16.0 g/dL    Hematocrit 30.6 (L) 37.0 - 47.0 %   Comp Metabolic Panel    Collection Time: 11/04/22  3:20 AM   Result Value Ref Range    Sodium 145 135  - 145 mmol/L    Potassium 3.9 3.6 - 5.5 mmol/L    Chloride 114 (H) 96 - 112 mmol/L    Co2 20 20 - 33 mmol/L    Anion Gap 11.0 7.0 - 16.0    Glucose 105 (H) 65 - 99 mg/dL    Bun 17 8 - 22 mg/dL    Creatinine 0.50 0.50 - 1.40 mg/dL    Calcium 7.6 (L) 8.5 - 10.5 mg/dL    AST(SGOT) 17 12 - 45 U/L    ALT(SGPT) 8 2 - 50 U/L    Alkaline Phosphatase 41 30 - 99 U/L    Total Bilirubin 0.3 0.1 - 1.5 mg/dL    Albumin 2.3 (L) 3.2 - 4.9 g/dL    Total Protein 4.3 (L) 6.0 - 8.2 g/dL    Globulin 2.0 1.9 - 3.5 g/dL    A-G Ratio 1.2 g/dL   CBC WITH DIFFERENTIAL    Collection Time: 11/04/22  3:20 AM   Result Value Ref Range    WBC 14.7 (H) 4.8 - 10.8 K/uL    RBC 2.32 (L) 4.20 - 5.40 M/uL    Hemoglobin 7.1 (L) 12.0 - 16.0 g/dL    Hematocrit 21.4 (L) 37.0 - 47.0 %    MCV 92.2 81.4 - 97.8 fL    MCH 30.6 27.0 - 33.0 pg    MCHC 33.2 (L) 33.6 - 35.0 g/dL    RDW 56.4 (H) 35.9 - 50.0 fL    Platelet Count 196 164 - 446 K/uL    MPV 10.4 9.0 - 12.9 fL    Neutrophils-Polys 67.70 44.00 - 72.00 %    Lymphocytes 17.90 (L) 22.00 - 41.00 %    Monocytes 12.30 0.00 - 13.40 %    Eosinophils 0.50 0.00 - 6.90 %    Basophils 0.50 0.00 - 1.80 %    Immature Granulocytes 1.10 (H) 0.00 - 0.90 %    Nucleated RBC 0.20 /100 WBC    Neutrophils (Absolute) 9.95 (H) 2.00 - 7.15 K/uL    Lymphs (Absolute) 2.64 1.00 - 4.80 K/uL    Monos (Absolute) 1.81 (H) 0.00 - 0.85 K/uL    Eos (Absolute) 0.07 0.00 - 0.51 K/uL    Baso (Absolute) 0.08 0.00 - 0.12 K/uL    Immature Granulocytes (abs) 0.16 (H) 0.00 - 0.11 K/uL    NRBC (Absolute) 0.03 K/uL   ESTIMATED GFR    Collection Time: 11/04/22  3:20 AM   Result Value Ref Range    GFR (CKD-EPI) 99 >60 mL/min/1.73 m 2   HEMOGLOBIN AND HEMATOCRIT    Collection Time: 11/04/22 10:25 AM   Result Value Ref Range    Hemoglobin 8.1 (L) 12.0 - 16.0 g/dL    Hematocrit 24.4 (L) 37.0 - 47.0 %   Prothrombin Time    Collection Time: 11/04/22 11:44 AM   Result Value Ref Range    PT 14.8 (H) 12.0 - 14.6 sec    INR 1.17 (H) 0.87 - 1.13   APTT     Collection Time: 11/04/22 11:44 AM   Result Value Ref Range    APTT 21.7 (L) 24.7 - 36.0 sec       Radiology Review:  CTA ABDOMEN PELVIS W & W/O POST PROCESS   Final Result         1.  Normal aorta without visualized aneurysm or dissection.   2.  No intraluminal contrast identified to indicate source of GI bleed   3.  Small fat-containing umbilical hernia   4.  Coarse calcification in the left inguinal ring, of uncertain significance, could represent changes related to prior hernia repair   5.  Diverticulosis      DX-CHEST-PORTABLE (1 VIEW)   Final Result      No acute cardiac or pulmonary abnormalities are identified.      DX-CHEST-PORTABLE (1 VIEW)   Final Result      No acute cardiopulmonary abnormality.         CTA ABDOMEN PELVIS W & W/O POST PROCESS   Final Result      1.  No evidence of active arterial gastrointestinal bleeding.   2.  Colonic diverticulosis. No diverticulitis.   3.  A 4.5 cm right posterior rectocele.            NM-GI BLEEDING SCAN    (Results Pending)         [facility] (Data Review):   -Records reviewed and summarized in current documentation  -I personally reviewed and interpreted the laboratory results  -I personally reviewed the radiology images    Assessment/Recommendations:  Patient with copious amounts of BRBPR.  Hemoglobin 7.1 this morning.  She received 1 unit PRBCs.  Upon examination, patient having gross amount of bloody stools.  Stat tagged RBC scan ordered.  Throughout the morning, patient continued to have copious amounts of bloody stools and received 2 additional units of PRBCs.  Nuclear med scan pending.  If active bleeding found, recommend consulting with IR for possible embolization.  Otherwise, if negative, plan for colonoscopy tomorrow.      LGI bleed: Differential includes diverticular, hemorrhoidal, AVMs, brisk upper GI bleed (unlikely based on hemodynamic stability)  - Maintain 2 large bore IVs  - Active type and screen  - Recommend fluid resusc w/ goal of normalizing heart  rate  - Monitor H/H. Hgb goal >7 (>9 in setting of active chest pain), INR <2, plat >50 with active bleeding.  - Maintain NPO status  -Nuclear med scan pending.  If active bleeding found, recommend consulting with IR for possible embolization.  Otherwise, if negative, plan for colonoscopy tomorrow.    Discussed with patient, patient's , Dr. Dan, bedside nursing, Dr. Wang.     Core Quality Measures   Reviewed items::  Labs, Medications and Radiology reports reviewed

## 2022-11-04 NOTE — PROGRESS NOTES
Pt left unit in bed, on tele box with transporter Bertin. All belongings with pt.  present at time of transfer. No concerns. Report phoned to bedside RN prior to transfer.

## 2022-11-04 NOTE — PROGRESS NOTES
Report received from ICU RN, patient transferred to floor. Patient AAOx1, on tele monitoring, on RA, and VSS. Bed in lowest and locked position, call light within reach, and patient questions answered.

## 2022-11-04 NOTE — CARE PLAN
Problem: Knowledge Deficit - Standard  Goal: Patient and family/care givers will demonstrate understanding of plan of care, disease process/condition, diagnostic tests and medications  Outcome: Progressing     Problem: Skin Integrity  Goal: Skin integrity is maintained or improved  Outcome: Progressing     Problem: Fall Risk  Goal: Patient will remain free from falls  Outcome: Progressing     Problem: Psychosocial  Goal: Patient's ability to verbalize feelings about condition will improve  Outcome: Progressing  Goal: Patient's ability to re-evaluate and adapt role responsibilities will improve  Outcome: Progressing  Goal: Patient and family will demonstrate ability to cope with life altering diagnosis and/or procedure  Outcome: Progressing     Problem: Hemodynamics  Goal: Patient's hemodynamics, fluid balance and neurologic status will be stable or improve  Outcome: Progressing     Problem: Respiratory  Goal: Patient will achieve/maintain optimum respiratory ventilation and gas exchange  Outcome: Progressing     Problem: Urinary - Renal Perfusion  Goal: Ability to achieve and maintain adequate renal perfusion and functioning will improve  Outcome: Progressing   The patient is Stable - Low risk of patient condition declining or worsening    Shift Goals  Clinical Goals: hemodynamic stability  Patient Goals: rest  Family Goals: VINCENT

## 2022-11-04 NOTE — DISCHARGE PLANNING
Case Management Discharge Planning    Admission Date: 11/1/2022  GMLOS: 2.1  ALOS: 3    6-Clicks ADL Score: 15  6-Clicks Mobility Score: 14  PT and/or OT Eval ordered: No  Post-acute Referrals Ordered: no  Post-acute Choice Obtained: no  Has referral(s) been sent to post-acute provider:  no      Anticipated Discharge Dispo: Discharge Disposition: Discharged to home/self care (01)  SNF vs HH    DME Needed: no    Action(s) Taken: discussed during IDT rounds. Per MD, pt has been having bloody stools ( RN stated that it was about 1 liter of blood) so pt needs colonoscopy. GI ordered a RBC scan.     PT/OT is pending. MD will order.    Escalations Completed: no    Medically Clear: No    Next Steps: no    Barriers to Discharge: medical clearance, pending PT/OT, possible need for placement.     Is the patient up for discharge tomorrow: no

## 2022-11-04 NOTE — PROGRESS NOTES
4 Eyes Skin Assessment Completed by JESUS Scott and JESUS Crawford.    Head WDL  Ears WDL  Nose WDL  Mouth WDL  Neck WDL  Breast/Chest WDL  Shoulder Blades WDL  Spine WDL  (R) Arm/Elbow/Hand Bruising and Edema  (L) Arm/Elbow/Hand Bruising and Edema  Abdomen WDL  Groin WDL  Scrotum/Coccyx/Buttocks WDL  (R) Leg Bruising  (L) Leg Bruising  (R) Heel/Foot/Toe Redness and Blanching  (L) Heel/Foot/Toe Redness and Blanching          Devices In Places Tele Box, Blood Pressure Cuff, and Pulse Ox      Interventions In Place Pressure Redistribution Mattress    Possible Skin Injury No    Pictures Uploaded Into Epic N/A  Wound Consult Placed N/A  RN Wound Prevention Protocol Ordered No

## 2022-11-04 NOTE — PROGRESS NOTES
Lab called with change in hemoglobin from 10.3 to 7.1 and hematocrit from 30.6 to 21.4, after repeat testing to confirm results. FABIEN Bishopr notified. Orders received.

## 2022-11-05 ENCOUNTER — ANESTHESIA (OUTPATIENT)
Dept: SURGERY | Facility: MEDICAL CENTER | Age: 72
DRG: 377 | End: 2022-11-05
Payer: COMMERCIAL

## 2022-11-05 ENCOUNTER — ANESTHESIA EVENT (OUTPATIENT)
Dept: SURGERY | Facility: MEDICAL CENTER | Age: 72
DRG: 377 | End: 2022-11-05
Payer: COMMERCIAL

## 2022-11-05 LAB
ALBUMIN SERPL BCP-MCNC: 2.2 G/DL (ref 3.2–4.9)
ALBUMIN/GLOB SERPL: 1.1 G/DL
ALP SERPL-CCNC: 44 U/L (ref 30–99)
ALT SERPL-CCNC: 10 U/L (ref 2–50)
ANION GAP SERPL CALC-SCNC: 11 MMOL/L (ref 7–16)
AST SERPL-CCNC: 16 U/L (ref 12–45)
BILIRUB SERPL-MCNC: 0.3 MG/DL (ref 0.1–1.5)
BUN SERPL-MCNC: 9 MG/DL (ref 8–22)
CALCIUM SERPL-MCNC: 7.2 MG/DL (ref 8.5–10.5)
CHLORIDE SERPL-SCNC: 111 MMOL/L (ref 96–112)
CO2 SERPL-SCNC: 21 MMOL/L (ref 20–33)
CREAT SERPL-MCNC: 0.41 MG/DL (ref 0.5–1.4)
GFR SERPLBLD CREATININE-BSD FMLA CKD-EPI: 104 ML/MIN/1.73 M 2
GLOBULIN SER CALC-MCNC: 2 G/DL (ref 1.9–3.5)
GLUCOSE SERPL-MCNC: 86 MG/DL (ref 65–99)
HCT VFR BLD AUTO: 28.1 % (ref 37–47)
HCT VFR BLD AUTO: 30.1 % (ref 37–47)
HCT VFR BLD AUTO: 30.3 % (ref 37–47)
HCT VFR BLD AUTO: 31.8 % (ref 37–47)
HGB BLD-MCNC: 10.2 G/DL (ref 12–16)
HGB BLD-MCNC: 10.3 G/DL (ref 12–16)
HGB BLD-MCNC: 10.7 G/DL (ref 12–16)
HGB BLD-MCNC: 9.6 G/DL (ref 12–16)
POTASSIUM SERPL-SCNC: 3.2 MMOL/L (ref 3.6–5.5)
PROT SERPL-MCNC: 4.2 G/DL (ref 6–8.2)
SODIUM SERPL-SCNC: 143 MMOL/L (ref 135–145)

## 2022-11-05 PROCEDURE — 36415 COLL VENOUS BLD VENIPUNCTURE: CPT

## 2022-11-05 PROCEDURE — 770020 HCHG ROOM/CARE - TELE (206)

## 2022-11-05 PROCEDURE — 160009 HCHG ANES TIME/MIN: Performed by: INTERNAL MEDICINE

## 2022-11-05 PROCEDURE — 160203 HCHG ENDO MINUTES - 1ST 30 MINS LEVEL 4: Performed by: INTERNAL MEDICINE

## 2022-11-05 PROCEDURE — 700111 HCHG RX REV CODE 636 W/ 250 OVERRIDE (IP): Performed by: INTERNAL MEDICINE

## 2022-11-05 PROCEDURE — 0DJD8ZZ INSPECTION OF LOWER INTESTINAL TRACT, VIA NATURAL OR ARTIFICIAL OPENING ENDOSCOPIC: ICD-10-PCS | Performed by: INTERNAL MEDICINE

## 2022-11-05 PROCEDURE — 700105 HCHG RX REV CODE 258: Performed by: NURSE PRACTITIONER

## 2022-11-05 PROCEDURE — 160208 HCHG ENDO MINUTES - EA ADDL 1 MIN LEVEL 4: Performed by: INTERNAL MEDICINE

## 2022-11-05 PROCEDURE — 80053 COMPREHEN METABOLIC PANEL: CPT

## 2022-11-05 PROCEDURE — A9270 NON-COVERED ITEM OR SERVICE: HCPCS | Performed by: INTERNAL MEDICINE

## 2022-11-05 PROCEDURE — 700111 HCHG RX REV CODE 636 W/ 250 OVERRIDE (IP): Performed by: STUDENT IN AN ORGANIZED HEALTH CARE EDUCATION/TRAINING PROGRAM

## 2022-11-05 PROCEDURE — 700101 HCHG RX REV CODE 250: Performed by: STUDENT IN AN ORGANIZED HEALTH CARE EDUCATION/TRAINING PROGRAM

## 2022-11-05 PROCEDURE — 160002 HCHG RECOVERY MINUTES (STAT): Performed by: INTERNAL MEDICINE

## 2022-11-05 PROCEDURE — 160035 HCHG PACU - 1ST 60 MINS PHASE I: Performed by: INTERNAL MEDICINE

## 2022-11-05 PROCEDURE — 99232 SBSQ HOSP IP/OBS MODERATE 35: CPT | Performed by: INTERNAL MEDICINE

## 2022-11-05 PROCEDURE — 85018 HEMOGLOBIN: CPT | Mod: 91

## 2022-11-05 PROCEDURE — 85014 HEMATOCRIT: CPT

## 2022-11-05 PROCEDURE — C9113 INJ PANTOPRAZOLE SODIUM, VIA: HCPCS | Performed by: INTERNAL MEDICINE

## 2022-11-05 PROCEDURE — 160048 HCHG OR STATISTICAL LEVEL 1-5: Performed by: INTERNAL MEDICINE

## 2022-11-05 PROCEDURE — 45382 COLONOSCOPY W/CONTROL BLEED: CPT | Performed by: INTERNAL MEDICINE

## 2022-11-05 PROCEDURE — 99100 ANES PT EXTEME AGE<1 YR&>70: CPT | Performed by: STUDENT IN AN ORGANIZED HEALTH CARE EDUCATION/TRAINING PROGRAM

## 2022-11-05 PROCEDURE — 700102 HCHG RX REV CODE 250 W/ 637 OVERRIDE(OP): Performed by: INTERNAL MEDICINE

## 2022-11-05 PROCEDURE — 93005 ELECTROCARDIOGRAM TRACING: CPT | Performed by: INTERNAL MEDICINE

## 2022-11-05 PROCEDURE — 00812 ANES LWR INTST SCR COLSC: CPT | Performed by: STUDENT IN AN ORGANIZED HEALTH CARE EDUCATION/TRAINING PROGRAM

## 2022-11-05 RX ORDER — LIDOCAINE HYDROCHLORIDE 20 MG/ML
INJECTION, SOLUTION EPIDURAL; INFILTRATION; INTRACAUDAL; PERINEURAL PRN
Status: DISCONTINUED | OUTPATIENT
Start: 2022-11-05 | End: 2022-11-05 | Stop reason: SURG

## 2022-11-05 RX ORDER — HYDROMORPHONE HYDROCHLORIDE 1 MG/ML
0.2 INJECTION, SOLUTION INTRAMUSCULAR; INTRAVENOUS; SUBCUTANEOUS
Status: DISCONTINUED | OUTPATIENT
Start: 2022-11-05 | End: 2022-11-05 | Stop reason: HOSPADM

## 2022-11-05 RX ORDER — HYDRALAZINE HYDROCHLORIDE 20 MG/ML
5 INJECTION INTRAMUSCULAR; INTRAVENOUS
Status: DISCONTINUED | OUTPATIENT
Start: 2022-11-05 | End: 2022-11-05 | Stop reason: HOSPADM

## 2022-11-05 RX ORDER — HYDROMORPHONE HYDROCHLORIDE 1 MG/ML
0.4 INJECTION, SOLUTION INTRAMUSCULAR; INTRAVENOUS; SUBCUTANEOUS
Status: DISCONTINUED | OUTPATIENT
Start: 2022-11-05 | End: 2022-11-05 | Stop reason: HOSPADM

## 2022-11-05 RX ORDER — HYDROMORPHONE HYDROCHLORIDE 1 MG/ML
0.1 INJECTION, SOLUTION INTRAMUSCULAR; INTRAVENOUS; SUBCUTANEOUS
Status: DISCONTINUED | OUTPATIENT
Start: 2022-11-05 | End: 2022-11-05 | Stop reason: HOSPADM

## 2022-11-05 RX ORDER — SODIUM CHLORIDE, SODIUM LACTATE, POTASSIUM CHLORIDE, CALCIUM CHLORIDE 600; 310; 30; 20 MG/100ML; MG/100ML; MG/100ML; MG/100ML
INJECTION, SOLUTION INTRAVENOUS CONTINUOUS
Status: DISCONTINUED | OUTPATIENT
Start: 2022-11-05 | End: 2022-11-05 | Stop reason: HOSPADM

## 2022-11-05 RX ORDER — HALOPERIDOL 5 MG/ML
1 INJECTION INTRAMUSCULAR
Status: DISCONTINUED | OUTPATIENT
Start: 2022-11-05 | End: 2022-11-05 | Stop reason: HOSPADM

## 2022-11-05 RX ORDER — OXYCODONE HCL 5 MG/5 ML
10 SOLUTION, ORAL ORAL
Status: DISCONTINUED | OUTPATIENT
Start: 2022-11-05 | End: 2022-11-05 | Stop reason: HOSPADM

## 2022-11-05 RX ORDER — ONDANSETRON 2 MG/ML
4 INJECTION INTRAMUSCULAR; INTRAVENOUS
Status: DISCONTINUED | OUTPATIENT
Start: 2022-11-05 | End: 2022-11-05 | Stop reason: HOSPADM

## 2022-11-05 RX ORDER — DIPHENHYDRAMINE HYDROCHLORIDE 50 MG/ML
12.5 INJECTION INTRAMUSCULAR; INTRAVENOUS
Status: DISCONTINUED | OUTPATIENT
Start: 2022-11-05 | End: 2022-11-05 | Stop reason: HOSPADM

## 2022-11-05 RX ORDER — OXYCODONE HCL 5 MG/5 ML
5 SOLUTION, ORAL ORAL
Status: DISCONTINUED | OUTPATIENT
Start: 2022-11-05 | End: 2022-11-05 | Stop reason: HOSPADM

## 2022-11-05 RX ADMIN — SODIUM CHLORIDE, POTASSIUM CHLORIDE, SODIUM LACTATE AND CALCIUM CHLORIDE: 600; 310; 30; 20 INJECTION, SOLUTION INTRAVENOUS at 11:23

## 2022-11-05 RX ADMIN — LIDOCAINE HYDROCHLORIDE 40 MG: 20 INJECTION, SOLUTION EPIDURAL; INFILTRATION; INTRACAUDAL at 11:25

## 2022-11-05 RX ADMIN — PROPOFOL 10 MG: 10 INJECTION, EMULSION INTRAVENOUS at 11:26

## 2022-11-05 RX ADMIN — PROPOFOL 60 MG: 10 INJECTION, EMULSION INTRAVENOUS at 11:25

## 2022-11-05 RX ADMIN — SODIUM CHLORIDE, POTASSIUM CHLORIDE, SODIUM LACTATE AND CALCIUM CHLORIDE: 600; 310; 30; 20 INJECTION, SOLUTION INTRAVENOUS at 01:24

## 2022-11-05 RX ADMIN — LEVOTHYROXINE SODIUM 88 MCG: 0.09 TABLET ORAL at 05:20

## 2022-11-05 RX ADMIN — PEG-3350, SODIUM SULFATE, SODIUM CHLORIDE, POTASSIUM CHLORIDE, SODIUM ASCORBATE AND ASCORBIC ACID 100 G: KIT at 05:21

## 2022-11-05 RX ADMIN — PANTOPRAZOLE SODIUM 40 MG: 40 INJECTION, POWDER, FOR SOLUTION INTRAVENOUS at 16:43

## 2022-11-05 RX ADMIN — PANTOPRAZOLE SODIUM 40 MG: 40 INJECTION, POWDER, FOR SOLUTION INTRAVENOUS at 05:20

## 2022-11-05 ASSESSMENT — ENCOUNTER SYMPTOMS
NAUSEA: 0
DIZZINESS: 0
SHORTNESS OF BREATH: 0
MYALGIAS: 0
MEMORY LOSS: 1
WEAKNESS: 0
ABDOMINAL PAIN: 0

## 2022-11-05 ASSESSMENT — FIBROSIS 4 INDEX: FIB4 SCORE: 1.86

## 2022-11-05 ASSESSMENT — PAIN DESCRIPTION - PAIN TYPE
TYPE: ACUTE PAIN
TYPE: SURGICAL PAIN

## 2022-11-05 ASSESSMENT — PAIN SCALES - GENERAL: PAIN_LEVEL: 1

## 2022-11-05 NOTE — PROGRESS NOTES
Hospital Medicine Daily Progress Note    Date of Service  11/5/2022    Chief Complaint  Jayshree Reinoso is a 72 y.o. female admitted 11/1/2022 with GIB    Hospital Course  72-year-old female with history of dementia, hypothyroidism and GI bleeding who presented 11/1 with lower GI bleeding.  During her lunch on the day of admission patient fainted and was unresponsive for seconds.  Her family noticed blood coming down from her rectum.  On admission CTA for abdomen did not show any active bleeding.  GI was consulted. She had syncope in the hospital and Hgb dropped 9.9 to 7.1 and she was transferred to ICU for transfusions. Repeat CTA did not show bleeding. GI continues to follow, colonoscopy was canceled for now and recommends staying NPO.    Interval Problem Update  Patient seen and examined, her hemoglobidn her hemoglobin stable this morning, going for colonoscopy, her NM gi bleeding scan yesterday was negative.  GI following appreciate rec.  Close monitoring of her H&H     I have discussed this patient's plan of care and discharge plan at IDT rounds today with Case Management, Nursing, Nursing leadership, and other members of the IDT team.    Consultants/Specialty  critical care and GI    Code Status  Full Code    Disposition  Patient is not medically cleared for discharge.   Anticipate discharge to to home with organized home healthcare and close outpatient follow-up.  I have placed the appropriate orders for post-discharge needs.    Review of Systems  Review of Systems   Constitutional:  Negative for malaise/fatigue.   Respiratory:  Negative for shortness of breath.    Cardiovascular:  Negative for chest pain.   Gastrointestinal:  Negative for abdominal pain and nausea.   Musculoskeletal:  Negative for myalgias.   Neurological:  Negative for dizziness and weakness.   Psychiatric/Behavioral:  Positive for memory loss.    All other systems reviewed and are negative.     Physical Exam  Temp:  [36.2 °C (97.1 °F)-37.2  °C (99 °F)] 36.2 °C (97.1 °F)  Pulse:  [73-97] 82  Resp:  [16-18] 18  BP: ()/(54-86) 145/79  SpO2:  [92 %-100 %] 99 %    Physical Exam  Vitals and nursing note reviewed.   Constitutional:       General: She is not in acute distress.     Appearance: She is not toxic-appearing.   HENT:      Head: Normocephalic.      Mouth/Throat:      Mouth: Mucous membranes are moist.   Eyes:      General:         Right eye: No discharge.         Left eye: No discharge.   Cardiovascular:      Rate and Rhythm: Normal rate and regular rhythm.   Pulmonary:      Effort: Pulmonary effort is normal. No respiratory distress.      Breath sounds: No wheezing or rales.   Abdominal:      Palpations: Abdomen is soft.      Tenderness: There is no abdominal tenderness. There is no guarding or rebound.   Musculoskeletal:      Cervical back: Neck supple.      Right lower leg: Edema present.      Left lower leg: Edema present.   Skin:     General: Skin is warm and dry.   Neurological:      Mental Status: She is alert.      Comments: Oriented to self only. Follows commands and answers questions appropriately       Fluids    Intake/Output Summary (Last 24 hours) at 11/5/2022 1135  Last data filed at 11/5/2022 0100  Gross per 24 hour   Intake 600 ml   Output 755 ml   Net -155 ml       Laboratory  Recent Labs     11/03/22  0228 11/03/22  0830 11/03/22  1620 11/04/22  0320 11/04/22  1025 11/04/22  1809 11/05/22  0100 11/05/22  0558   WBC 19.1* 24.4*  --  14.7*  --   --   --   --    RBC 3.47* 3.68*  --  2.32*  --   --   --   --    HEMOGLOBIN 10.4* 11.4*   < > 7.1*   < > 9.9* 9.6* 10.3*   HEMATOCRIT 30.8* 33.4*   < > 21.4*   < > 29.2* 28.1* 30.3*   MCV 88.8 90.8  --  92.2  --   --   --   --    MCH 30.0 31.0  --  30.6  --   --   --   --    MCHC 33.8 34.1  --  33.2*  --   --   --   --    RDW 51.7* 55.3*  --  56.4*  --   --   --   --    PLATELETCT 201 229  --  196  --   --   --   --    MPV 10.9 11.2  --  10.4  --   --   --   --     < > = values in this  interval not displayed.     Recent Labs     11/03/22  0228 11/04/22  0320 11/05/22  0558   SODIUM 139 145 143   POTASSIUM 4.2 3.9 3.2*   CHLORIDE 111 114* 111   CO2 17* 20 21   GLUCOSE 136* 105* 86   BUN 14 17 9   CREATININE 0.36* 0.50 0.41*   CALCIUM 7.9* 7.6* 7.2*     Recent Labs     11/04/22  1144   APTT 21.7*   INR 1.17*               Imaging  NM-GI BLEEDING SCAN   Final Result      1.  Negative scan for GI bleeding.   2.  The case was discussed (call report) with gastroenterology consultant Dr. Campbell at 1602 hours 11/4/2022.      CTA ABDOMEN PELVIS W & W/O POST PROCESS   Final Result         1.  Normal aorta without visualized aneurysm or dissection.   2.  No intraluminal contrast identified to indicate source of GI bleed   3.  Small fat-containing umbilical hernia   4.  Coarse calcification in the left inguinal ring, of uncertain significance, could represent changes related to prior hernia repair   5.  Diverticulosis      DX-CHEST-PORTABLE (1 VIEW)   Final Result      No acute cardiac or pulmonary abnormalities are identified.      DX-CHEST-PORTABLE (1 VIEW)   Final Result      No acute cardiopulmonary abnormality.         CTA ABDOMEN PELVIS W & W/O POST PROCESS   Final Result      1.  No evidence of active arterial gastrointestinal bleeding.   2.  Colonic diverticulosis. No diverticulitis.   3.  A 4.5 cm right posterior rectocele.                 Assessment/Plan  * GI bleeding- (present on admission)  Assessment & Plan  Patient has history of diverticulitis bleeding last in 2019   came with painless GI bleeding and syncope  Last colonoscopy in 2016, no family history of colon cancer.  CTA for abdomen did not show any active bleeding  On PPI BID  Hemoglobin every 8 hours and blood transfusion if less than 7  GI consulted, possible diverticular bleed  No colonoscopy planned  If rebleeding, will consider RBC scan  NPO today and reeva tomorrow    11/4: emoglobin dropped again from 10 to 7 early this mornign  received 1 unit of RBC and her hemoglobin went up to 8 but after patient started having copious amount of blood per rectum. Giving another 2 units orf RBC now.  GI was notified and has ordered a NM Gi-bleed scan and if a bleeding can been seen will discuss with IR for possible embolization otherwise if negative plan is for colonoscopy tomorrow. Cont on IVF.  Cont keeping her NPO     11/5: hemoglobin stable this morning going for colonoscopy today   GI following appreciate rec.     Hemorrhagic shock (HCC)- (present on admission)  Assessment & Plan  Resolved    Vasovagal syncope- (present on admission)  Assessment & Plan  Related to acute GI bleed/acute blood loss anemia     Plan to get a stat CBC and transfuse PRBCs if hemoglobin less than 7    Start LR bolus of 500 cc x 1    Dizziness- (present on admission)  Assessment & Plan  Likely related to GI bleed   Monitor her hb       Dementia (HCC)- (present on admission)  Assessment & Plan  Moderate to advanced  On admission patient is alert to herself and age only, around her baseline  lives with her family  Continue nonpharmacological treatment to avoid delirium  Avoid benzo and antihistamine  Full code at this time.    Hypothyroid- (present on admission)  Assessment & Plan  Continue home dose of levothyroxine 100 mcg daily       VTE prophylaxis: SCDs/TEDs    I have performed a physical exam and reviewed and updated ROS and Plan today (11/5/2022). In review of yesterday's note (11/4/2022), there are no changes except as documented above.

## 2022-11-05 NOTE — PROGRESS NOTES
Saw the patient at the bedside.   Prep is not finished, the patient could not.     Long discussion with the patient and his , we decide still do a colonoscopy on 11/5 for possible active bleeding. (Lower GI)    Most likely diverticular bleeding, but others not excluded.     Procedure: Colonoscopy.     The patient and her  is aware due to the lack of prep, we might not be able to complete the scope/find the source of bleeding.     They agree with the plan of suboptimal colonoscopy.     Stable vital. Exams are the same/similar.   One bowel movement in the morning, still dark/maroon.

## 2022-11-05 NOTE — PROGRESS NOTES
Pt has colonoscopy ordered for this morning, Movi prep is ordered BID, RN has tried multiple times to get pt to drink this prep but pt has refused.  RN has tried to educate pt on the importance of drinking the Movi prep but pt still refuses.  Pt is AAO1, resting comfortably, hemodynamically stable, will continue to attempt to get pt to finish prep and continue to monitor.

## 2022-11-05 NOTE — CONSULTS
Reason for PC Consult: Advance Care Planning    Consulted by: Dr. Sutherland    Assessment:  General: Jayshree Reinoso is a 72 year-old female pt with a PMHx of dementia, hypothyroidism, and diverticular bleeding. She presented to the ED on 11/1/2022 for evaluation of syncope and GIB. Colonoscopy was attempted 11/2 but was aborted due to anemia, hypotension, and tachycardia. She was able to undergo colonoscopy earlier today (11/5)    Social: Jayshree is  to her spouse, Per. They have two children and four grandchildren. Jayshree is a retired . She enjoys playing cards, Critical [employer], doing word searches, and watching her grandchildren play sports.    Consults: GI, critical care    Dyspnea: No  Last BM: 11/05/22  Pain: No  Depression: Mood appropriate for situation  Dementia: Yes; 5    Spiritual:  Is Hinduism or spirituality important for coping with this illness? Yes  Has a  or spiritual provider visit been requested? No    Palliative Performance Scale: 60%    Advance Directive: None  DPOA: No  POLST: Yes    Code Status: DNR/DNI    Outcome:  Consult received and EMR reviewed. Discussed case with physician, updates appreciated.    PC RN met with pt and her  at bedside. Introduced self and role of palliative care. Discussed case with patient's , Per, separately. He updated RN on the social and medical history. Assessed Per's understanding of current medical status, overall health picture, and options for future care. Demonstrates a good understanding of the current clinical picture.    Explored patient's expressed values, beliefs, and preferences in order to identify GOC. Per states that patient gets frustrated with her being dependent upon Per. Her dementia has limited their ability to remain social with friends, but Jayshree does quite well with their routine at home.     Lengthy discussion regarding dementia - it's progressive and terminal nature, the disease trajectory, expectations  "with further advancement (including loss of safe swallowing), the sadness of loss experienced by loved ones even prior to death, the difficulties for spouses/family in facing the realities of decline/loss of independence, and the care available.    Discussed the issue of code status in detail and the measures employed in a full code including CPR (chest compressions & intubation), medications, and possibly defibrillation. Per states that patient would not want resuscitation or mechanical ventilation. He states \"that would just be delaying the inevitable.\" He agrees that he would like patient's code status changed to reflect DNAR/DNI, MD notified.     POLST completed at this encounter with Per selecting DNAR, selective treatment (no intubation), and no artificial nutrition/hydration. This was signed by pt's spouse and physician. The original will be sent with pt on discharge and a copy will be scanned into Epic. To view this, please hover over code status.     PC RN queried if pt has ever discussed healthcare wishes and values or completed an Advance Directive in the past. Per believes that patient has an AD at home. He will attempt to locate this and bring a copy to the bedside tomorrow.     Active listening, reflection, reminiscing, validation & normalization, and empathic support utilized throughout this encounter.  All questions answered and contact information provided.     Updated: MD and RN    Plan: Will continue to monitor clinical picture and support pt/family.    Thank you for allowing Palliative Care to participate in this patient's care. Please feel free to call [phone] with any questions or concerns.   "

## 2022-11-05 NOTE — ANESTHESIA TIME REPORT
Anesthesia Start and Stop Event Times     Date Time Event    11/5/2022 0831 Ready for Procedure     1123 Anesthesia Start     1206 Anesthesia Stop        Responsible Staff  11/05/22    Name Role Begin End    Cooper Jeff D.O. Anesth 1123 1206        Overtime Reason:  no overtime (within assigned shift)    Comments:

## 2022-11-05 NOTE — ANESTHESIA PREPROCEDURE EVALUATION
Case: [id number] Date/Time: 11/05/22 1045    Procedure: COLONOSCOPY    Location: [facility] OR 04 / SURGERY [facility]    Surgeons: Charli Cortes M.D.          Relevant Problems   ENDO   (positive) Hypothyroid       Physical Exam    Airway   Mallampati: II  TM distance: >3 FB  Neck ROM: full       Cardiovascular - normal exam  Rhythm: regular  Rate: normal  (-) murmur     Dental - normal exam           Pulmonary - normal exam  Breath sounds clear to auscultation     Abdominal    Neurological - normal exam                 Anesthesia Plan    ASA 3       Plan - general       Airway plan will be natural airway          Induction: intravenous    Postoperative Plan: Postoperative administration of opioids is intended.    Pertinent diagnostic labs and testing reviewed    Informed Consent:    Anesthetic plan and risks discussed with patient.    Use of blood products discussed with: patient whom consented to blood products.

## 2022-11-05 NOTE — PROCEDURES
OPERATIVE REPORT        PATIENT: Jayshree Reinoso  1950      PREOPERATIVE DIAGNOSIS/INDICATION: Lower Gi bleeding     POSTOPERATIVE DIAGNOSES: r/o diverticular bleeding.     PROCEDURE: COLONOSCOPY    PHYSICIAN: Joce Cortes    CONSENT:  OBTAINED. The risks, benefits and alternatives of the procedure were discussed in details. The risks include and are not limited to bleeding, infection, perforation, missed lesions, and sedations risks (cardiopulmonary compromise and allergic reaction to medications).    ANESTHESIA:  Per anesthesiologist.    LOCATION: [facility]    DESCRIPTION:  The patient presented to the procedure room.  After routine checkup was performed, patient was brought into endoscopy suite.  Patient was placed on his left lateral decubitus position.  Patient was sedated by anesthesia. Vital signs were monitored throughout procedure.  Oxygenation support was provided throughout procedure. Digital rectal examination was performed.  Then, a colonoscope was inserted into patient's anus, advanced to the cecum under direct visualization.  Once the site was reached and examined, the colonoscope was withdrawn and procedure was terminated. Withdrawal time was at least 6 minutes to ensure adequate examination.     The patient tolerated the procedure well.  There were no immediate complications.    [place]  Right: 2  Mid:2  Left: 1      Cecal intubation yes. Two times of cecal intubation done.    Retroflexion done.       RECTUM: within normal limits.    SIGMOID COLON: within normal limits.    DESCENDING COLON: within normal limits.    TRANSVERSE COLON: within normal limits.    ASCENDING COLON: within normal limits.     CECUM: within normal limits. Brown liquid coming down from small bowel.     Multiple diverticular pouches through out the whole colon; both right and left side. Old blood was seen from A colon to Rectum. No active bleeding was seen. No bleeder to intervene.       RECOMMENDATIONS:  Consistent with  diverticular bleeding.     Recs:  - Keep supportive care.   - Okay to resume oral diet, liquid first.   - The diverticular bleeding usually resolve slowly by the patient. If more brisk/major bleeding happens, please arrange repeat RBC scan to localize the source (left, or T colon or Right) in case the patient needs surgical intervention eventually.     We will keep follow up.     This note has been transcribed with digital voice recognition software and although it has been reviewed may contain grammatical or word errors

## 2022-11-05 NOTE — CARE PLAN
Problem: Skin Integrity  Goal: Skin integrity is maintained or improved  Outcome: Progressing     Problem: Fall Risk  Goal: Patient will remain free from falls  Outcome: Progressing   The patient is Watcher - Medium risk of patient condition declining or worsening    Shift Goals  Clinical Goals: Monitor H & H, drink bowel prep, stable vitals  Patient Goals: rest  Family Goals: VINCENT    Progress made toward(s) clinical / shift goals:  pt has remained free of falls and skin integrity has been maintained     Patient is not progressing towards the following goals: pt has refused to drink bowel prep

## 2022-11-05 NOTE — PROGRESS NOTES
Assumed care of patient Wily LEE. Pt is A+O x1. No chest pain or SOB. Informed of safety and call system. rhythm is Sinus Rhythm. Pt is stable.  Per is at bedside. Plan is for colonoscopy this am.

## 2022-11-05 NOTE — ANESTHESIA POSTPROCEDURE EVALUATION
Patient: Jayshree Reinoso    Procedure Summary     Date: 11/05/22 Room / Location: [facility] 04 / SURGERY [facility]    Anesthesia Start: 1123 Anesthesia Stop: 1206    Procedure: COLONOSCOPY (Anus) Diagnosis: (Diverticulosis, GI bleed, no active bleeding)    Surgeons: Charli Cortes M.D. Responsible Provider: Cooper Jeff D.O.    Anesthesia Type: general ASA Status: 3          Final Anesthesia Type: general  Last vitals  BP   Blood Pressure : (!) 145/79    Temp   36.2 °C (97.1 °F)    Pulse   82   Resp   18    SpO2   99 %      Anesthesia Post Evaluation    Patient location during evaluation: PACU  Patient participation: complete - patient participated  Level of consciousness: awake and alert  Pain score: 1    Airway patency: patent  Anesthetic complications: no  Cardiovascular status: hemodynamically stable  Respiratory status: acceptable  Hydration status: euvolemic    PONV: none          No notable events documented.     Nurse Pain Score: 0 (NPRS)

## 2022-11-06 LAB
ALBUMIN SERPL BCP-MCNC: 2.6 G/DL (ref 3.2–4.9)
ALBUMIN/GLOB SERPL: 1.1 G/DL
ALP SERPL-CCNC: 51 U/L (ref 30–99)
ALT SERPL-CCNC: 11 U/L (ref 2–50)
ANION GAP SERPL CALC-SCNC: 14 MMOL/L (ref 7–16)
AST SERPL-CCNC: 16 U/L (ref 12–45)
BILIRUB SERPL-MCNC: 0.4 MG/DL (ref 0.1–1.5)
BUN SERPL-MCNC: 5 MG/DL (ref 8–22)
CALCIUM SERPL-MCNC: 7.8 MG/DL (ref 8.5–10.5)
CHLORIDE SERPL-SCNC: 103 MMOL/L (ref 96–112)
CO2 SERPL-SCNC: 22 MMOL/L (ref 20–33)
CREAT SERPL-MCNC: 0.32 MG/DL (ref 0.5–1.4)
EKG IMPRESSION: NORMAL
GFR SERPLBLD CREATININE-BSD FMLA CKD-EPI: 111 ML/MIN/1.73 M 2
GLOBULIN SER CALC-MCNC: 2.3 G/DL (ref 1.9–3.5)
GLUCOSE SERPL-MCNC: 76 MG/DL (ref 65–99)
HCT VFR BLD AUTO: 31.1 % (ref 37–47)
HCT VFR BLD AUTO: 31.2 % (ref 37–47)
HCT VFR BLD AUTO: 33 % (ref 37–47)
HCT VFR BLD AUTO: 33.2 % (ref 37–47)
HGB BLD-MCNC: 10.4 G/DL (ref 12–16)
HGB BLD-MCNC: 10.5 G/DL (ref 12–16)
HGB BLD-MCNC: 11.1 G/DL (ref 12–16)
HGB BLD-MCNC: 11.2 G/DL (ref 12–16)
MAGNESIUM SERPL-MCNC: 1.7 MG/DL (ref 1.5–2.5)
POTASSIUM SERPL-SCNC: 3 MMOL/L (ref 3.6–5.5)
PROT SERPL-MCNC: 4.9 G/DL (ref 6–8.2)
SODIUM SERPL-SCNC: 139 MMOL/L (ref 135–145)

## 2022-11-06 PROCEDURE — 93010 ELECTROCARDIOGRAM REPORT: CPT | Performed by: INTERNAL MEDICINE

## 2022-11-06 PROCEDURE — A9270 NON-COVERED ITEM OR SERVICE: HCPCS | Performed by: INTERNAL MEDICINE

## 2022-11-06 PROCEDURE — 700102 HCHG RX REV CODE 250 W/ 637 OVERRIDE(OP): Performed by: INTERNAL MEDICINE

## 2022-11-06 PROCEDURE — 83735 ASSAY OF MAGNESIUM: CPT

## 2022-11-06 PROCEDURE — 99232 SBSQ HOSP IP/OBS MODERATE 35: CPT | Performed by: INTERNAL MEDICINE

## 2022-11-06 PROCEDURE — A9270 NON-COVERED ITEM OR SERVICE: HCPCS

## 2022-11-06 PROCEDURE — C9113 INJ PANTOPRAZOLE SODIUM, VIA: HCPCS | Performed by: INTERNAL MEDICINE

## 2022-11-06 PROCEDURE — 85014 HEMATOCRIT: CPT | Mod: 91

## 2022-11-06 PROCEDURE — 700111 HCHG RX REV CODE 636 W/ 250 OVERRIDE (IP): Performed by: INTERNAL MEDICINE

## 2022-11-06 PROCEDURE — 80053 COMPREHEN METABOLIC PANEL: CPT

## 2022-11-06 PROCEDURE — 85018 HEMOGLOBIN: CPT | Mod: 91

## 2022-11-06 PROCEDURE — 700102 HCHG RX REV CODE 250 W/ 637 OVERRIDE(OP)

## 2022-11-06 PROCEDURE — 770020 HCHG ROOM/CARE - TELE (206)

## 2022-11-06 PROCEDURE — 99233 SBSQ HOSP IP/OBS HIGH 50: CPT | Performed by: NURSE PRACTITIONER

## 2022-11-06 RX ORDER — OMEPRAZOLE 20 MG/1
20 CAPSULE, DELAYED RELEASE ORAL 2 TIMES DAILY
Status: DISCONTINUED | OUTPATIENT
Start: 2022-11-06 | End: 2022-11-07 | Stop reason: HOSPADM

## 2022-11-06 RX ORDER — POTASSIUM CHLORIDE 20 MEQ/1
40 TABLET, EXTENDED RELEASE ORAL 2 TIMES DAILY
Status: COMPLETED | OUTPATIENT
Start: 2022-11-06 | End: 2022-11-06

## 2022-11-06 RX ADMIN — POTASSIUM CHLORIDE 40 MEQ: 1500 TABLET, EXTENDED RELEASE ORAL at 08:52

## 2022-11-06 RX ADMIN — LEVOTHYROXINE SODIUM 88 MCG: 0.09 TABLET ORAL at 05:14

## 2022-11-06 RX ADMIN — OMEPRAZOLE 20 MG: 20 CAPSULE, DELAYED RELEASE ORAL at 17:06

## 2022-11-06 RX ADMIN — POTASSIUM CHLORIDE 40 MEQ: 1500 TABLET, EXTENDED RELEASE ORAL at 17:06

## 2022-11-06 RX ADMIN — PANTOPRAZOLE SODIUM 40 MG: 40 INJECTION, POWDER, FOR SOLUTION INTRAVENOUS at 05:14

## 2022-11-06 ASSESSMENT — ENCOUNTER SYMPTOMS
ABDOMINAL PAIN: 0
DIARRHEA: 0
COUGH: 0
BACK PAIN: 0
NAUSEA: 0
MEMORY LOSS: 1
SHORTNESS OF BREATH: 0
BLURRED VISION: 0
BLOOD IN STOOL: 0
DIZZINESS: 0
MYALGIAS: 0
CHILLS: 0
FEVER: 0
CONSTIPATION: 0
WEAKNESS: 1
WEAKNESS: 0

## 2022-11-06 ASSESSMENT — FIBROSIS 4 INDEX: FIB4 SCORE: 1.77

## 2022-11-06 ASSESSMENT — PAIN DESCRIPTION - PAIN TYPE
TYPE: ACUTE PAIN

## 2022-11-06 NOTE — PROGRESS NOTES
Report received and assumed patient care. Pt A&O x 2, disoriented to location and situation, and on 2L NC, saturating 93 %. No signs of distress noted at this time. Pt has tele box in place. Pt updated on plan of care for the day and has call light within reach. Fall precautions are in place.

## 2022-11-06 NOTE — PROGRESS NOTES
Assumed care of patient from Vonda LEE. Pt is A+O x1. No chest pain or SOB. No active bleeding noted. Informed of safety and call system. rhythm is Sinus rhythm to sinus tach. PT/OT to evaluate pt. IS implemented, o2 to be performed. No concerns at this time.

## 2022-11-06 NOTE — PROGRESS NOTES
Hospital Medicine Daily Progress Note    Date of Service  11/6/2022    Chief Complaint  Jayshree Reinoso is a 72 y.o. female admitted 11/1/2022 with GIB    Hospital Course  72-year-old female with history of dementia, hypothyroidism and GI bleeding who presented 11/1 with lower GI bleeding.  During her lunch on the day of admission patient fainted and was unresponsive for seconds.  Her family noticed blood coming down from her rectum.  On admission CTA for abdomen did not show any active bleeding.  GI was consulted. She had syncope in the hospital and Hgb dropped 9.9 to 7.1 and she was transferred to ICU for transfusions. Repeat CTA did not show bleeding. GI continues to follow, colonoscopy was canceled for now and recommends staying NPO.    Interval Problem Update  Patient seen and examined, her hemoglobidn her hemoglobin stable this morning, had colonoscopy done yesterday which showed multiple diverticular pouches through out the whole colon; both right and left side. Old blood was seen from A colon to Rectum. No active bleeding was seen  Appreciate GI rec  PT/OT eval     I have discussed this patient's plan of care and discharge plan at IDT rounds today with Case Management, Nursing, Nursing leadership, and other members of the IDT team.    Consultants/Specialty  critical care and GI    Code Status  DNAR/DNI    Disposition  Patient is not medically cleared for discharge.   Anticipate discharge to to home with organized home healthcare and close outpatient follow-up.  I have placed the appropriate orders for post-discharge needs.    Review of Systems  Review of Systems   Constitutional:  Negative for malaise/fatigue.   Respiratory:  Negative for shortness of breath.    Cardiovascular:  Negative for chest pain.   Gastrointestinal:  Negative for abdominal pain and nausea.   Musculoskeletal:  Negative for myalgias.   Neurological:  Negative for dizziness and weakness.   Psychiatric/Behavioral:  Positive for memory  loss.    All other systems reviewed and are negative.     Physical Exam  Temp:  [36.3 °C (97.4 °F)-37.1 °C (98.7 °F)] 37 °C (98.6 °F)  Pulse:  [75-88] 87  Resp:  [17-18] 17  BP: (127-145)/(74-93) 145/77  SpO2:  [91 %-98 %] 93 %    Physical Exam  Vitals and nursing note reviewed.   Constitutional:       General: She is not in acute distress.     Appearance: She is not toxic-appearing.   HENT:      Head: Normocephalic.      Mouth/Throat:      Mouth: Mucous membranes are moist.   Eyes:      General:         Right eye: No discharge.         Left eye: No discharge.   Cardiovascular:      Rate and Rhythm: Normal rate and regular rhythm.   Pulmonary:      Effort: Pulmonary effort is normal. No respiratory distress.      Breath sounds: No wheezing or rales.   Abdominal:      Palpations: Abdomen is soft.      Tenderness: There is no abdominal tenderness. There is no guarding or rebound.   Musculoskeletal:      Cervical back: Neck supple.      Right lower leg: Edema present.      Left lower leg: Edema present.   Skin:     General: Skin is warm and dry.   Neurological:      Mental Status: She is alert.      Comments: Oriented to self only. Follows commands and answers questions appropriately       Fluids    Intake/Output Summary (Last 24 hours) at 11/6/2022 1156  Last data filed at 11/6/2022 1000  Gross per 24 hour   Intake 790 ml   Output 4425 ml   Net -3635 ml       Laboratory  Recent Labs     11/04/22  0320 11/04/22  1025 11/05/22  1749 11/06/22  0034 11/06/22  0446   WBC 14.7*  --   --   --   --    RBC 2.32*  --   --   --   --    HEMOGLOBIN 7.1*   < > 10.2* 10.4* 10.5*   HEMATOCRIT 21.4*   < > 30.1* 31.1* 31.2*   MCV 92.2  --   --   --   --    MCH 30.6  --   --   --   --    MCHC 33.2*  --   --   --   --    RDW 56.4*  --   --   --   --    PLATELETCT 196  --   --   --   --    MPV 10.4  --   --   --   --     < > = values in this interval not displayed.     Recent Labs     11/04/22  0320 11/05/22  0558 11/06/22  0446   SODIUM  145 143 139   POTASSIUM 3.9 3.2* 3.0*   CHLORIDE 114* 111 103   CO2 20 21 22   GLUCOSE 105* 86 76   BUN 17 9 5*   CREATININE 0.50 0.41* 0.32*   CALCIUM 7.6* 7.2* 7.8*     Recent Labs     11/04/22  1144   APTT 21.7*   INR 1.17*               Imaging  NM-GI BLEEDING SCAN   Final Result      1.  Negative scan for GI bleeding.   2.  The case was discussed (call report) with gastroenterology consultant Dr. Campbell at 1602 hours 11/4/2022.      CTA ABDOMEN PELVIS W & W/O POST PROCESS   Final Result         1.  Normal aorta without visualized aneurysm or dissection.   2.  No intraluminal contrast identified to indicate source of GI bleed   3.  Small fat-containing umbilical hernia   4.  Coarse calcification in the left inguinal ring, of uncertain significance, could represent changes related to prior hernia repair   5.  Diverticulosis      DX-CHEST-PORTABLE (1 VIEW)   Final Result      No acute cardiac or pulmonary abnormalities are identified.      DX-CHEST-PORTABLE (1 VIEW)   Final Result      No acute cardiopulmonary abnormality.         CTA ABDOMEN PELVIS W & W/O POST PROCESS   Final Result      1.  No evidence of active arterial gastrointestinal bleeding.   2.  Colonic diverticulosis. No diverticulitis.   3.  A 4.5 cm right posterior rectocele.                 Assessment/Plan  * GI bleeding- (present on admission)  Assessment & Plan  Patient has history of diverticulitis bleeding last in 2019   came with painless GI bleeding and syncope  Last colonoscopy in 2016, no family history of colon cancer.  CTA for abdomen did not show any active bleeding  On PPI BID  Hemoglobin every 8 hours and blood transfusion if less than 7  GI consulted, possible diverticular bleed  No colonoscopy planned  If rebleeding, will consider RBC scan  NPO today and reeva tomorrow    11/4: emoglobin dropped again from 10 to 7 early this mornign received 1 unit of RBC and her hemoglobin went up to 8 but after patient started having copious amount  of blood per rectum. Giving another 2 units orf RBC now.  GI was notified and has ordered a NM Gi-bleed scan and if a bleeding can been seen will discuss with IR for possible embolization otherwise if negative plan is for colonoscopy tomorrow. Cont on IVF.  Cont keeping her NPO     11/5: hemoglobin stable this morning going for colonoscopy today   GI following appreciate rec.     11/6: had colonoscopy done yesterday which showed multiple diverticular pouches through out the whole colon; both right and left side. Old blood was seen from A colon to Rectum. No active bleeding was seen  Appreciate GI rec     Hemorrhagic shock (HCC)- (present on admission)  Assessment & Plan  Resolved    Vasovagal syncope- (present on admission)  Assessment & Plan  Related to acute GI bleed/acute blood loss anemia     Plan to get a stat CBC and transfuse PRBCs if hemoglobin less than 7    Start LR bolus of 500 cc x 1    Dizziness- (present on admission)  Assessment & Plan  Likely related to GI bleed   Monitor her hb       Dementia (HCC)- (present on admission)  Assessment & Plan  Moderate to advanced  On admission patient is alert to herself and age only, around her baseline  lives with her family  Continue nonpharmacological treatment to avoid delirium  Avoid benzo and antihistamine  Full code at this time.    Hypothyroid- (present on admission)  Assessment & Plan  Continue home dose of levothyroxine 100 mcg daily       VTE prophylaxis: SCDs/TEDs    I have performed a physical exam and reviewed and updated ROS and Plan today (11/6/2022). In review of yesterday's note (11/5/2022), there are no changes except as documented above.

## 2022-11-06 NOTE — CARE PLAN
The patient is Stable - Low risk of patient condition declining or worsening    Shift Goals  Clinical Goals: monitor H/H  Patient Goals: comfort, rest  Family Goals: VINCENT    Progress made toward(s) clinical / shift goals:      Problem: Knowledge Deficit - Standard  Goal: Patient and family/care givers will demonstrate understanding of plan of care, disease process/condition, diagnostic tests and medications  Outcome: Progressing     Problem: Skin Integrity  Goal: Skin integrity is maintained or improved  Outcome: Progressing     Problem: Fall Risk  Goal: Patient will remain free from falls  Outcome: Progressing     Problem: Hemodynamics  Goal: Patient's hemodynamics, fluid balance and neurologic status will be stable or improve  Outcome: Progressing       Patient is not progressing towards the following goals:

## 2022-11-06 NOTE — PROGRESS NOTES
..Gastroenterology Progress Note               Author:  MADONNA Lin Date & Time Created: 11/6/2022 9:57 AM       Patient ID:  Name:             Jayshree Reinoso  YOB: 1950  Age:                 72 y.o.  female  MRN:               [id number]        Medical Decision Making, by Problem:  Active Hospital Problems    Diagnosis     Leukocytosis [D72.829]     Melena [K92.1]     Vasovagal syncope [R55]     Hemorrhagic shock (HCC) [R57.8]     GI bleeding [K92.2]     Dizziness [R42]     Dementia (HCC) [F03.90]     Hypothyroid [E03.9]        Presenting Chief Complaint:  Lower GI bleeding    History of Present Illness: This is a 72 year old female with a past medical [id number] of dementia, hypothyroidism, diverticulosis, prior GI bleeding secondary to diverticulosis admitted 11/1/2022 for lower GI bleeding. Patient was very limited in providing history due to her dementia but her , Per, was at bedside to provide history. He states that the patient was in her normal state of health until prior to arrival. He states they were out at lunch and patient slumped over, he rushed to support her so she didn't fall, and patient was unresponsive for 30-60 seconds. When she awoke, she was complaining of abdominal pain and guarding her abdomen. EMS was called and stated that patient was hypotensive. Upon arrival to the emergency department, patient was noted to have gross amount of blood from rectum going down her legs. Patient reports that she had blood stool this morning only. She, furthermore, reports chronic headache but denies nausea, vomiting, dizziness, headache, shortness of breath, or weakness. She is normotensive. Denies pain. Hgb 12, plt 270, lactic acid 2.4, INR 1.07. BUN/Cr 35.5. CTA abdomen negative fo ractive arterial bleeding, diverticulosis noted but no diverticulitis, and a 4.5cm right posterior rectocele seen.     Of note, patient was admitted in 2016 and 2019 for acute blood loss,  "melena, and hematochezia due to GI bleeding. She had EGD and colonoscopy with GI consultants provider in 2016 and had repeat colonoscopy in 2019. No active bleeding was seen at that time. Patient did not follow up with GI outpatient since. Denies current or previous tobacco use, alcohol use or recreational drug use.     Interval History:  11/6/2022: Patient seen at bedside with . Denies bloody bowel movements. Hgb stable at 10.5. Underwent colonoscopy yesterday which was consistent with diverticular bleeding.       Hospital Medications:  Current Facility-Administered Medications   Medication Dose Frequency Provider Last Rate Last Admin    potassium chloride SA (Kdur) tablet 40 mEq  40 mEq BID Linsey M Wegener, A.P.R.N.   40 mEq at 11/06/22 0852    pantoprazole (Protonix) injection 40 mg  40 mg BID Cooper Sutherland M.D.   40 mg at 11/06/22 0514    ondansetron (ZOFRAN) syringe/vial injection 4 mg  4 mg Q6HRS PRN Cooper Sutherland M.D.   4 mg at 11/02/22 1901    levothyroxine (SYNTHROID) tablet 88 mcg  88 mcg AM ES Edith Mae M.D.   88 mcg at 11/06/22 0514    senna-docusate (PERICOLACE or SENOKOT S) 8.6-50 MG per tablet 2 Tablet  2 Tablet BID Edith Mae M.D.   2 Tablet at 11/04/22 0511    And    polyethylene glycol/lytes (MIRALAX) PACKET 1 Packet  1 Packet QDAY PRN Edith Mae M.D.        And    magnesium hydroxide (MILK OF MAGNESIA) suspension 30 mL  30 mL QDAY PRN Edith Mae M.D.        And    bisacodyl (DULCOLAX) suppository 10 mg  10 mg QDAY PRN Edith Mae M.D.        acetaminophen (Tylenol) tablet 650 mg  650 mg Q6HRS PRN Edith Mae M.D.       Last reviewed on 11/5/2022  8:34 AM by Cooper Jeff D.O.       Vital signs:  Weight/BMI: Body mass index is 26.2 kg/m².  BP (!) 145/77   Pulse 87   Temp 37 °C (98.6 °F) (Temporal)   Resp 17   Ht 1.624 m (5' 3.94\")   Wt 69.1 kg (152 lb 5.4 oz)   SpO2 91%   Vitals:    11/05/22 1956 11/06/22 0000 11/06/22 0400 " 11/06/22 0830   BP: (!) 140/83 (!) 138/93 138/88 (!) 145/77   Pulse: 78 84 87    Resp: 18 18 17 17   Temp:       TempSrc: Temporal Temporal Temporal Temporal   SpO2: 93% 93% 94% 91%   Weight: 69.1 kg (152 lb 5.4 oz)      Height:         Oxygen Therapy:  Pulse Oximetry: 91 %, O2 (LPM): 2, O2 Delivery Device: Room air w/o2 available    Intake/Output Summary (Last 24 hours) at 11/6/2022 0957  Last data filed at 11/6/2022 0800  Gross per 24 hour   Intake 550 ml   Output 4425 ml   Net -3875 ml     ..Review of Systems   Constitutional:  Negative for chills and fever.   HENT:  Negative for hearing loss.    Eyes:  Negative for blurred vision.   Respiratory:  Negative for cough and shortness of breath.    Cardiovascular:  Negative for chest pain and leg swelling.   Gastrointestinal:  Negative for abdominal pain, blood in stool, constipation and diarrhea.   Musculoskeletal:  Negative for back pain.   Neurological:  Positive for weakness. Negative for dizziness.   Psychiatric/Behavioral:  Positive for memory loss.    All other systems reviewed and are negative.      Physical Exam:  Physical Exam  Vitals and nursing note reviewed.   Constitutional:       General: She is not in acute distress.     Appearance: Normal appearance.   HENT:      Head: Normocephalic and atraumatic.      Right Ear: External ear normal.      Left Ear: External ear normal.      Nose: Nose normal.      Mouth/Throat:      Mouth: Mucous membranes are moist.      Pharynx: Oropharynx is clear.   Eyes:      General: No scleral icterus.  Cardiovascular:      Rate and Rhythm: Normal rate and regular rhythm.      Pulses: Normal pulses.      Heart sounds: Normal heart sounds.   Pulmonary:      Effort: Pulmonary effort is normal. No respiratory distress.   Abdominal:      General: Abdomen is flat. Bowel sounds are normal.      Palpations: Abdomen is soft.      Tenderness: There is no abdominal tenderness.   Musculoskeletal:         General: Normal range of motion.       Cervical back: Normal range of motion and neck supple.   Skin:     General: Skin is warm and dry.      Coloration: Skin is pale.   Neurological:      Mental Status: She is alert. Mental status is at baseline. She is disoriented.   Psychiatric:         Mood and Affect: Mood normal.           Labs:  Recent Labs     11/04/22 0320 11/05/22 0558 11/06/22 0446   SODIUM 145 143 139   POTASSIUM 3.9 3.2* 3.0*   CHLORIDE 114* 111 103   CO2 20 21 22   BUN 17 9 5*   CREATININE 0.50 0.41* 0.32*   MAGNESIUM  --   --  1.7   CALCIUM 7.6* 7.2* 7.8*     Recent Labs     11/04/22 0320 11/05/22  0558 11/06/22 0446   ALTSGPT 8 10 11   ASTSGOT 17 16 16   ALKPHOSPHAT 41 44 51   TBILIRUBIN 0.3 0.3 0.4   GLUCOSE 105* 86 76     Recent Labs     11/04/22 0320 11/05/22 0558 11/06/22 0446   WBC 14.7*  --   --    NEUTSPOLYS 67.70  --   --    LYMPHOCYTES 17.90*  --   --    MONOCYTES 12.30  --   --    EOSINOPHILS 0.50  --   --    BASOPHILS 0.50  --   --    ASTSGOT 17 16 16   ALTSGPT 8 10 11   ALKPHOSPHAT 41 44 51   TBILIRUBIN 0.3 0.3 0.4     Recent Labs     11/04/22 0320 11/04/22  1025 11/04/22  1144 11/04/22  1809 11/05/22  1749 11/06/22  0034 11/06/22 0446   RBC 2.32*  --   --   --   --   --   --    HEMOGLOBIN 7.1*   < >  --    < > 10.2* 10.4* 10.5*   HEMATOCRIT 21.4*   < >  --    < > 30.1* 31.1* 31.2*   PLATELETCT 196  --   --   --   --   --   --    PROTHROMBTM  --   --  14.8*  --   --   --   --    APTT  --   --  21.7*  --   --   --   --    INR  --   --  1.17*  --   --   --   --     < > = values in this interval not displayed.     Recent Results (from the past 24 hour(s))   HEMOGLOBIN AND HEMATOCRIT    Collection Time: 11/05/22  1:13 PM   Result Value Ref Range    Hemoglobin 10.7 (L) 12.0 - 16.0 g/dL    Hematocrit 31.8 (L) 37.0 - 47.0 %   HEMOGLOBIN AND HEMATOCRIT    Collection Time: 11/05/22  5:49 PM   Result Value Ref Range    Hemoglobin 10.2 (L) 12.0 - 16.0 g/dL    Hematocrit 30.1 (L) 37.0 - 47.0 %   HEMOGLOBIN AND HEMATOCRIT     Collection Time: 11/06/22 12:34 AM   Result Value Ref Range    Hemoglobin 10.4 (L) 12.0 - 16.0 g/dL    Hematocrit 31.1 (L) 37.0 - 47.0 %   Comp Metabolic Panel    Collection Time: 11/06/22  4:46 AM   Result Value Ref Range    Sodium 139 135 - 145 mmol/L    Potassium 3.0 (L) 3.6 - 5.5 mmol/L    Chloride 103 96 - 112 mmol/L    Co2 22 20 - 33 mmol/L    Anion Gap 14.0 7.0 - 16.0    Glucose 76 65 - 99 mg/dL    Bun 5 (L) 8 - 22 mg/dL    Creatinine 0.32 (L) 0.50 - 1.40 mg/dL    Calcium 7.8 (L) 8.5 - 10.5 mg/dL    AST(SGOT) 16 12 - 45 U/L    ALT(SGPT) 11 2 - 50 U/L    Alkaline Phosphatase 51 30 - 99 U/L    Total Bilirubin 0.4 0.1 - 1.5 mg/dL    Albumin 2.6 (L) 3.2 - 4.9 g/dL    Total Protein 4.9 (L) 6.0 - 8.2 g/dL    Globulin 2.3 1.9 - 3.5 g/dL    A-G Ratio 1.1 g/dL   HEMOGLOBIN AND HEMATOCRIT    Collection Time: 11/06/22  4:46 AM   Result Value Ref Range    Hemoglobin 10.5 (L) 12.0 - 16.0 g/dL    Hematocrit 31.2 (L) 37.0 - 47.0 %   ESTIMATED GFR    Collection Time: 11/06/22  4:46 AM   Result Value Ref Range    GFR (CKD-EPI) 111 >60 mL/min/1.73 m 2   MAGNESIUM    Collection Time: 11/06/22  4:46 AM   Result Value Ref Range    Magnesium 1.7 1.5 - 2.5 mg/dL       Radiology Review:  NM-GI BLEEDING SCAN   Final Result      1.  Negative scan for GI bleeding.   2.  The case was discussed (call report) with gastroenterology consultant Dr. Campbell at 1602 hours 11/4/2022.      CTA ABDOMEN PELVIS W & W/O POST PROCESS   Final Result         1.  Normal aorta without visualized aneurysm or dissection.   2.  No intraluminal contrast identified to indicate source of GI bleed   3.  Small fat-containing umbilical hernia   4.  Coarse calcification in the left inguinal ring, of uncertain significance, could represent changes related to prior hernia repair   5.  Diverticulosis      DX-CHEST-PORTABLE (1 VIEW)   Final Result      No acute cardiac or pulmonary abnormalities are identified.      DX-CHEST-PORTABLE (1 VIEW)   Final Result      No  acute cardiopulmonary abnormality.         CTA ABDOMEN PELVIS W & W/O POST PROCESS   Final Result      1.  No evidence of active arterial gastrointestinal bleeding.   2.  Colonic diverticulosis. No diverticulitis.   3.  A 4.5 cm right posterior rectocele.              MDM (Data Review):   -Records reviewed and summarized in current documentation  -I personally reviewed and interpreted the laboratory results  -I personally reviewed the radiology images    Assessment/Recommendations:  72 year old woman admitted for lower GI bleeding. Hgb stable. NM scan negative and colonoscopy consistent with diverticular bleeding.    -Continue to follow diverticulosis diet  -Monitor for further bleeding  -May transition to oral PPI on discharge    GI will sign off. Please do not hesitate to reach out with any questions or to reconsult.     Core Quality Measures   Reviewed items::  Labs, Medications and Radiology reports reviewed

## 2022-11-06 NOTE — CARE PLAN
The patient is Stable - Low risk of patient condition declining or worsening    Shift Goals  Clinical Goals: decrease o2, PT/OT eval  Patient Goals: discharge  Family Goals: safety    Progress made toward(s) clinical / shift goals:  d/c o2 & wilkes, increase appetite for lunch     *pt is still waiting for pt/ot eval

## 2022-11-06 NOTE — DIETARY
Nutrition Services Brief Update:    RN requesting Boost plus vanilla TID with meals. Po thus far <25%. Will add.    RD following.

## 2022-11-07 ENCOUNTER — PHARMACY VISIT (OUTPATIENT)
Dept: PHARMACY | Facility: MEDICAL CENTER | Age: 72
End: 2022-11-07
Payer: MEDICARE

## 2022-11-07 VITALS
HEART RATE: 91 BPM | BODY MASS INDEX: 23.15 KG/M2 | WEIGHT: 135.58 LBS | SYSTOLIC BLOOD PRESSURE: 135 MMHG | TEMPERATURE: 97.7 F | OXYGEN SATURATION: 92 % | DIASTOLIC BLOOD PRESSURE: 86 MMHG | RESPIRATION RATE: 18 BRPM | HEIGHT: 64 IN

## 2022-11-07 LAB
ALBUMIN SERPL BCP-MCNC: 2.9 G/DL (ref 3.2–4.9)
ALBUMIN/GLOB SERPL: 1.1 G/DL
ALP SERPL-CCNC: 59 U/L (ref 30–99)
ALT SERPL-CCNC: 13 U/L (ref 2–50)
ANION GAP SERPL CALC-SCNC: 12 MMOL/L (ref 7–16)
AST SERPL-CCNC: 20 U/L (ref 12–45)
BILIRUB SERPL-MCNC: 0.5 MG/DL (ref 0.1–1.5)
BUN SERPL-MCNC: 4 MG/DL (ref 8–22)
CALCIUM SERPL-MCNC: 8.4 MG/DL (ref 8.5–10.5)
CHLORIDE SERPL-SCNC: 105 MMOL/L (ref 96–112)
CO2 SERPL-SCNC: 21 MMOL/L (ref 20–33)
CREAT SERPL-MCNC: 0.33 MG/DL (ref 0.5–1.4)
GFR SERPLBLD CREATININE-BSD FMLA CKD-EPI: 110 ML/MIN/1.73 M 2
GLOBULIN SER CALC-MCNC: 2.6 G/DL (ref 1.9–3.5)
GLUCOSE SERPL-MCNC: 77 MG/DL (ref 65–99)
HCT VFR BLD AUTO: 34.8 % (ref 37–47)
HGB BLD-MCNC: 11.4 G/DL (ref 12–16)
MAGNESIUM SERPL-MCNC: 1.8 MG/DL (ref 1.5–2.5)
POTASSIUM SERPL-SCNC: 4.3 MMOL/L (ref 3.6–5.5)
PROT SERPL-MCNC: 5.5 G/DL (ref 6–8.2)
SODIUM SERPL-SCNC: 138 MMOL/L (ref 135–145)

## 2022-11-07 PROCEDURE — 97162 PT EVAL MOD COMPLEX 30 MIN: CPT

## 2022-11-07 PROCEDURE — 80053 COMPREHEN METABOLIC PANEL: CPT

## 2022-11-07 PROCEDURE — RXMED WILLOW AMBULATORY MEDICATION CHARGE: Performed by: INTERNAL MEDICINE

## 2022-11-07 PROCEDURE — 99239 HOSP IP/OBS DSCHRG MGMT >30: CPT | Performed by: INTERNAL MEDICINE

## 2022-11-07 PROCEDURE — A9270 NON-COVERED ITEM OR SERVICE: HCPCS | Performed by: INTERNAL MEDICINE

## 2022-11-07 PROCEDURE — 700102 HCHG RX REV CODE 250 W/ 637 OVERRIDE(OP): Performed by: INTERNAL MEDICINE

## 2022-11-07 PROCEDURE — 85014 HEMATOCRIT: CPT

## 2022-11-07 PROCEDURE — 97166 OT EVAL MOD COMPLEX 45 MIN: CPT

## 2022-11-07 PROCEDURE — 85018 HEMOGLOBIN: CPT

## 2022-11-07 PROCEDURE — 83735 ASSAY OF MAGNESIUM: CPT

## 2022-11-07 RX ORDER — OMEPRAZOLE 20 MG/1
20 CAPSULE, DELAYED RELEASE ORAL 2 TIMES DAILY
Qty: 30 CAPSULE | Refills: 0 | Status: SHIPPED | OUTPATIENT
Start: 2022-11-07 | End: 2023-06-04

## 2022-11-07 RX ADMIN — OMEPRAZOLE 20 MG: 20 CAPSULE, DELAYED RELEASE ORAL at 05:22

## 2022-11-07 RX ADMIN — LEVOTHYROXINE SODIUM 88 MCG: 0.09 TABLET ORAL at 05:22

## 2022-11-07 ASSESSMENT — COGNITIVE AND FUNCTIONAL STATUS - GENERAL
WALKING IN HOSPITAL ROOM: A LITTLE
DAILY ACTIVITIY SCORE: 21
MOVING FROM LYING ON BACK TO SITTING ON SIDE OF FLAT BED: A LITTLE
SUGGESTED CMS G CODE MODIFIER MOBILITY: CK
EATING MEALS: A LITTLE
MOVING TO AND FROM BED TO CHAIR: A LITTLE
CLIMB 3 TO 5 STEPS WITH RAILING: A LITTLE
HELP NEEDED FOR BATHING: A LITTLE
TOILETING: A LITTLE
MOBILITY SCORE: 18
STANDING UP FROM CHAIR USING ARMS: A LITTLE
SUGGESTED CMS G CODE MODIFIER DAILY ACTIVITY: CJ
TURNING FROM BACK TO SIDE WHILE IN FLAT BAD: A LITTLE

## 2022-11-07 ASSESSMENT — GAIT ASSESSMENTS
DEVIATION: SHUFFLED GAIT;BRADYKINETIC
GAIT LEVEL OF ASSIST: SUPERVISED
DISTANCE (FEET): 30
ASSISTIVE DEVICE: FRONT WHEEL WALKER

## 2022-11-07 ASSESSMENT — PAIN DESCRIPTION - PAIN TYPE
TYPE: ACUTE PAIN
TYPE: ACUTE PAIN

## 2022-11-07 ASSESSMENT — ACTIVITIES OF DAILY LIVING (ADL): TOILETING: INDEPENDENT

## 2022-11-07 NOTE — DISCHARGE PLANNING
RNCM PC to  to inquire HH choice. Per daliad 1) [facility]  2) [facility]. Referral sent to [facility] to run insurance.  RNCM noted declination from [facility]  via Epic; sent referral to [facility]      1130: RNCM received call from [facility] that pt is accepted onto services

## 2022-11-07 NOTE — PROGRESS NOTES
Report received and assumed patient care. Pt A&O x 1, oriented to person only, and on room air. No signs of distress noted at this time. Pt has tele box in place. Pt updated on plan of care for the day and has call light within reach. Fall precautions are in place.

## 2022-11-07 NOTE — PROGRESS NOTES
This RN was notified by the monitor room at 2320 that the patient had a run of PSVT in the 180's for about 4 seconds. Patient was asymptomatic and sleeping at the time. Notified MD.

## 2022-11-07 NOTE — DISCHARGE SUMMARY
Discharge Summary    CHIEF COMPLAINT ON ADMISSION  Chief Complaint   Patient presents with    Syncope       Reason for Admission  EMS     Admission Date  11/1/2022    CODE STATUS  Prior    HPI & HOSPITAL COURSE  This is a 72 y.o. female  with history of dementia, hypothyroidism and GI bleeding who presented 11/1 with lower GI bleeding.  Patient was not able to provide any history due to her dementia.  During her lunch on the day of admission patient fainted and was unresponsive for seconds.  Her family noticed blood coming down from her rectum.  Patient denied any nausea or vomiting.  Denied any abdominal pain or other abnormalities complaints.  Patient has history of GI bleeding from her diverticulitis and last colonoscopy 2016 with no any masses.  On admission CTA for abdomen did not show any active bleeding.  GI was consulted by ED physician. She continued to have bleeding per rectum a NM Gi bleeding scan was done but was also negative. She received total of 6 units of RBC. GI did a colonoscopy on 11/5/22 which showed multiple diverticular pouches through out the whole colon; both right and left side. Old blood was seen from A colon to Rectum. No active bleeding was seen.  Patient hemoglobin has been stable since.   Patient was seen by physical therapy and home health has been recommended.  Patient will be discharged home today with home health.    The patient met 2-midnight criteria for an inpatient stay at the time of discharge.    Discharge Date  11/7/2022    FOLLOW UP ITEMS POST DISCHARGE  PCP  GI    DISCHARGE DIAGNOSES  Principal Problem:    GI bleeding POA: Yes  Active Problems:    Hypothyroid (Chronic) POA: Yes    Dementia (HCC) POA: Yes    Dizziness POA: Yes    Melena POA: Yes    Vasovagal syncope POA: Yes    Hemorrhagic shock (HCC) POA: Yes    Leukocytosis POA: Unknown  Resolved Problems:    DNR (do not resuscitate) POA: Yes      Overview: IMO load March 2020      FOLLOW UP  No future  appointments.  Wily Garcia M.D.  [address] [facility] [address]  [phone]    Schedule an appointment as soon as possible for a visit in 2 week(s)  for hospitalization follow up    [facility] [id number]  [place] [id number]  [phone]          MEDICATIONS ON DISCHARGE     Medication List        START taking these medications        Instructions   omeprazole 20 MG delayed-release capsule  Commonly known as: PRILOSEC   Take 1 Capsule by mouth 2 times a day.  Dose: 20 mg            CONTINUE taking these medications        Instructions   CALCIUM-MAGNESIUM PO   Take 1 Tablet by mouth every day.  Dose: 1 Tablet     CO Q 10 PO   Take 1 Capsule by mouth every day.  Dose: 1 Capsule     levothyroxine 88 MCG Tabs  Commonly known as: SYNTHROID   Take 88 mcg by mouth every morning on an empty stomach.  Dose: 88 mcg     Melatonin 10 MG Tabs   Take 10 mg by mouth every evening.  Dose: 10 mg     VITAMIN A & D PO   Take 1 Tablet by mouth every day.  Dose: 1 Tablet     VITAMIN B COMPLEX PO   Take 1 Tablet by mouth every day.  Dose: 1 Tablet     vitamin k 100 MCG tablet   Take 100 mcg by mouth every day.  Dose: 100 mcg              Allergies  No Known Allergies    DIET  No orders of the defined types were placed in this encounter.      ACTIVITY  As tolerated.  Weight bearing as tolerated    CONSULTATIONS  GI    PROCEDURES  Colonoscopy     LABORATORY  Lab Results   Component Value Date    SODIUM 138 11/07/2022    POTASSIUM 4.3 11/07/2022    CHLORIDE 105 11/07/2022    CO2 21 11/07/2022    GLUCOSE 77 11/07/2022    BUN 4 (L) 11/07/2022    CREATININE 0.33 (L) 11/07/2022    CREATININE 0.8 04/24/2009        Lab Results   Component Value Date    WBC 14.7 (H) 11/04/2022    HEMOGLOBIN 11.4 (L) 11/07/2022    HEMATOCRIT 34.8 (L) 11/07/2022    PLATELETCT 196 11/04/2022        Total time of the discharge process exceeds 41 minutes.

## 2022-11-07 NOTE — FACE TO FACE
Face to Face Supporting Documentation - Home Health    The encounter with this patient was in whole or in part the primary reason for home health admission.    Date of encounter:   Patient:                    MRN:                       YOB: 2022  Jayshree Reinoso  [id number]  1950     Home health to see patient for:  Skilled Nursing care for assessment, interventions & education, Physical Therapy evaluation and treatment, and Occupational therapy evaluation and treatment    Skilled need for:  Comment: medication managment    Skilled nursing interventions to include:  Comment: medication management     Homebound status evidenced by:  Need the aid of supportive devices such as crutches, canes, wheelchairs or walkers. Leaving home requires a considerable and taxing effort. There is a normal inability to leave the home.    Community Physician to provide follow up care: Wily Garcia M.D.     Optional Interventions? No      I certify the face to face encounter for this home health care referral meets the CMS requirements and the encounter/clinical assessment with the patient was, in whole, or in part, for the medical condition(s) listed above, which is the primary reason for home health care. Based on my clinical findings: the service(s) are medically necessary, support the need for home health care, and the homebound criteria are met.  I certify that this patient has had a face to face encounter by myself.  Lc Dan M.D. - NPI: [id number]

## 2022-11-07 NOTE — THERAPY
73M PMH dementia, DM2, HTN, ?HF, HLD, recent admission for COPD pneumonia requiring intubation (11/2020 [place]), COVID in January 2021 (not intubated) currently being treated for pneumonia presents from nursing home for abdominal pain and vomiting, found to have multifocal PNA (unclear if sequela of prior COVID) and high-grade SBO. Course c/b new afib with RVR s/p amiodarone gtt, on standing Lopressor. Physical Therapy   Initial Evaluation     Patient Name: Jayshree Reinoso  Age:  72 y.o., Sex:  female  Medical Record #: [id number]  Today's Date: 11/7/2022     Precautions  Precautions: Fall Risk    Assessment  Patient is 72 y.o. female admitted with GIB and syncope. PMH includes dementia, hypothyroidism. Pts  present during evaluation providing most of pts prior level of function and home set up. Pt able to complete basic mobility with SPV and FWW.  observed mobility and comfortable with pt discharging home at current level of function. Patient will not be actively followed for physical therapy services at this time, however may be seen if requested by physician for 1 more visit within 30 days to address any discharge or equipment needs.    Plan    Recommend Physical Therapy for Evaluation only     DC Equipment Recommendations: None (use of FWW)  Discharge Recommendations: Recommend home health for continued physical therapy services          11/07/22 0848   Prior Living Situation   Prior Services Home-Independent   Housing / Facility 1 Story House   Steps Into Home 0   Steps In Home 0   Equipment Owned Front-Wheel Walker   Lives with - Patient's Self Care Capacity Spouse   Comments  provides SPV   Prior Level of Functional Mobility   Bed Mobility Independent   Transfer Status Independent   Ambulation Independent   Distance Ambulation (Feet)   (household)   Assistive Devices Used Front-Wheel Walker   Comments  provides supervision due to cog deficits   Cognition    Level of Consciousness Alert   Comments  provides most of information, states pt has menory deficits and he watches her   Active ROM Lower Body    Active ROM Lower Body  WDL   Strength Lower Body   Lower Body Strength  WDL   Comments functional but weak   Coordination Lower Body    Coordination Lower Body  WDL   Balance Assessment   Sitting Balance (Static) Fair +   Sitting Balance (Dynamic) Fair +   Standing Balance  (Static) Fair   Standing Balance (Dynamic) Fair   Weight Shift Sitting Good   Weight Shift Standing Fair   Comments FWW   Gait Analysis   Gait Level Of Assist Supervised   Assistive Device Front Wheel Walker   Distance (Feet) 30   # of Times Distance was Traveled 2   Deviation Shuffled Gait;Bradykinetic   Weight Bearing Status no restrictions   Comments no LOB   Bed Mobility    Supine to Sit Supervised   Scooting Supervised   Functional Mobility   Sit to Stand Supervised   Bed, Chair, Wheelchair Transfer Supervised   Toilet Transfers Standby Assist   Transfer Method Stand Step   Mobility in room with FWW   Education Group   Education Provided Role of Physical Therapist   Role of Physical Therapist Patient Response Patient;Significant Other;Acceptance;Demonstration;Explanation;Action Demonstration;Verbal Demonstration   Anticipated Discharge Equipment and Recommendations   DC Equipment Recommendations None  (use of FWW)   Discharge Recommendations Recommend home health for continued physical therapy services      73M PMH dementia, DM2, HTN, ?HF, HLD, recent admission for COPD pneumonia requiring intubation (11/2020 [place]), COVID in January 2021 (not intubated) currently being treated for pneumonia presents from nursing home for abdominal pain and vomiting, found to have multifocal PNA (unclear if sequela of prior COVID) and high-grade SBO. Course c/b new afib with RVR s/p amiodarone gtt. SBO now resolving.

## 2022-11-07 NOTE — DISCHARGE PLANNING
Meds-to-Beds: Discharge prescription order listed below delivered to patient in discharge lounge. RN notified. Patient counseled. Patient elected to have co-payment billed to patient account.       Current Outpatient Medications   Medication Sig Dispense Refill    omeprazole (PRILOSEC) 20 MG delayed-release capsule Take 1 Capsule by mouth 2 times a day. 30 Capsule 0      Thu Aguilar, PharmD

## 2022-11-07 NOTE — THERAPY
"Occupational Therapy   Initial Evaluation     Patient Name: Jayshree Reinoso  Age:  72 y.o., Sex:  female  Medical Record #: [id number]  Today's Date: 11/7/2022     Precautions: Fall Risk    Assessment  Patient is 72 y.o. female admitted with GI bleed, spouse at bedside reports he assists as needed daily d/t her baseline dementia. Pt demonstrates ADLs and transfers at SPV level, reports feeling weaker than normal d/t inactivity while hospitalized. Recommend home health upon DC.     Plan    Recommend Occupational Therapy for Evaluation only     DC Equipment Recommendations: None  Discharge Recommendations: Recommend home health for continued occupational therapy services     Subjective    \"I just want to go home. Been here 7 days.\"     Objective       11/07/22 0831   Prior Living Situation   Prior Services None   Housing / Facility 1 Story House   Bathroom Set up Bathtub / Shower Combination;Shower Curtain;Grab Bars;Shower Chair   Equipment Owned Front-Wheel Walker;Grab Bar(s) By Toilet;Tub / Shower Seat;Grab Bar(s) In Tub / Shower   Lives with - Patient's Self Care Capacity Spouse   Comments very supportive    Prior Level of ADL Function   Self Feeding Independent   Grooming / Hygiene Independent   Bathing Independent   Dressing Independent   Toileting Independent   Comments spouse provides supervision as needed   Prior Level of IADL Function   Comments  manages d/t pt memory issues   Precautions   Precautions Fall Risk   Cognition    Level of Consciousness Alert   Comments impaired memory at baseline,  assists 24/7 as needed   Balance Assessment   Sitting Balance (Static) Fair +   Sitting Balance (Dynamic) Fair +   Standing Balance (Static) Fair   Standing Balance (Dynamic) Fair   Weight Shift Sitting Fair   Weight Shift Standing Fair   Comments FWW   Bed Mobility    Supine to Sit Supervised   Sit to Supine Supervised   Scooting Supervised   Rolling Supervised   ADL Assessment   Eating Supervision "   Grooming Supervision;Standing   Upper Body Dressing Supervision   Lower Body Dressing Supervision   Toileting Supervision   How much help from another person does the patient currently need...   Putting on and taking off regular lower body clothing? 4   Bathing (including washing, rinsing, and drying)? 3   Toileting, which includes using a toilet, bedpan, or urinal? 3   Putting on and taking off regular upper body clothing? 4   Taking care of personal grooming such as brushing teeth? 4   Eating meals? 3   6 Clicks Daily Activity Score 21   Functional Mobility   Sit to Stand Supervised   Bed, Chair, Wheelchair Transfer Supervised   Toilet Transfers Supervised   Transfer Method Stand Step   Mobility FWW   Activity Tolerance   Sitting in Chair functional   Sitting Edge of Bed functional   Standing functional   Education Group   Education Provided Role of Occupational Therapist;Activities of Daily Living   Role of Occupational Therapist Patient Response Patient;Significant Other;Acceptance;Explanation;Verbal Demonstration   ADL Patient Response Patient;Significant Other;Acceptance;Explanation;Verbal Demonstration   Problem List   Problem List None   Anticipated Discharge Equipment and Recommendations   DC Equipment Recommendations None   Discharge Recommendations Recommend home health for continued occupational therapy services

## 2022-11-07 NOTE — DISCHARGE PLANNING
Received Choice form at 0905  Agency/Facility Name: [facility]  Referral sent per Choice form @ 8548

## 2022-11-07 NOTE — CARE PLAN
The patient is Stable - Low risk of patient condition declining or worsening    Shift Goals  Clinical Goals: monitor O2 needs, monitor labs  Patient Goals: comfort, rest  Family Goals: VINCENT    Progress made toward(s) clinical / shift goals:      Problem: Knowledge Deficit - Standard  Goal: Patient and family/care givers will demonstrate understanding of plan of care, disease process/condition, diagnostic tests and medications  Outcome: Progressing     Problem: Skin Integrity  Goal: Skin integrity is maintained or improved  Outcome: Progressing     Problem: Fall Risk  Goal: Patient will remain free from falls  Outcome: Progressing     Problem: Hemodynamics  Goal: Patient's hemodynamics, fluid balance and neurologic status will be stable or improve  Outcome: Progressing     Problem: Urinary - Renal Perfusion  Goal: Ability to achieve and maintain adequate renal perfusion and functioning will improve  Outcome: Progressing       Patient is not progressing towards the following goals:

## 2022-11-07 NOTE — PALLIATIVE CARE
Palliative Care follow-up  Received phone call from, Percandice Reinoso (pt's ), he is at pt's bedside with AD. PC RN to pt's bedside at 1010. AD received. POLST completed with Per as DPOA-HC per AD and sent with pt. Per understands POLST is to be posted on refrigerator at home.     Copies of both documents will be scanned into EMR and be visible by hovering cursor over code status.     Plan: Pt actively discharging with [facility].     Thank you for allowing Palliative Care to support this patient and family. Contact [phone] for additional assistance, change in patient status, or with any questions/concerns.

## 2022-11-07 NOTE — PROGRESS NOTES
Monitor Summary:    SR 73-87  Run of PSVT in the 180's for 4 seconds at 2320  (R) PVC, (F) PAC    .17/.07/.39

## 2023-06-04 ENCOUNTER — APPOINTMENT (OUTPATIENT)
Dept: RADIOLOGY | Facility: MEDICAL CENTER | Age: 73
DRG: 378 | End: 2023-06-04
Attending: EMERGENCY MEDICINE
Payer: COMMERCIAL

## 2023-06-04 ENCOUNTER — HOSPITAL ENCOUNTER (INPATIENT)
Facility: MEDICAL CENTER | Age: 73
LOS: 1 days | DRG: 378 | End: 2023-06-05
Attending: EMERGENCY MEDICINE | Admitting: HOSPITALIST
Payer: COMMERCIAL

## 2023-06-04 DIAGNOSIS — K92.2 GASTROINTESTINAL HEMORRHAGE, UNSPECIFIED GASTROINTESTINAL HEMORRHAGE TYPE: ICD-10-CM

## 2023-06-04 DIAGNOSIS — K92.2 LOWER GI BLEED: ICD-10-CM

## 2023-06-04 DIAGNOSIS — D62 ACUTE BLOOD LOSS ANEMIA: ICD-10-CM

## 2023-06-04 LAB
ABO GROUP BLD: NORMAL
ALBUMIN SERPL BCP-MCNC: 3.8 G/DL (ref 3.2–4.9)
ALBUMIN/GLOB SERPL: 1.2 G/DL
ALP SERPL-CCNC: 87 U/L (ref 30–99)
ALT SERPL-CCNC: 18 U/L (ref 2–50)
ANION GAP SERPL CALC-SCNC: 14 MMOL/L (ref 7–16)
APTT PPP: 22.4 SEC (ref 24.7–36)
AST SERPL-CCNC: 20 U/L (ref 12–45)
BASOPHILS # BLD AUTO: 0.6 % (ref 0–1.8)
BASOPHILS # BLD: 0.07 K/UL (ref 0–0.12)
BILIRUB SERPL-MCNC: <0.2 MG/DL (ref 0.1–1.5)
BLD GP AB SCN SERPL QL: NORMAL
BUN SERPL-MCNC: 29 MG/DL (ref 8–22)
CALCIUM ALBUM COR SERPL-MCNC: 9 MG/DL (ref 8.5–10.5)
CALCIUM SERPL-MCNC: 8.8 MG/DL (ref 8.5–10.5)
CHLORIDE SERPL-SCNC: 109 MMOL/L (ref 96–112)
CO2 SERPL-SCNC: 18 MMOL/L (ref 20–33)
CREAT SERPL-MCNC: 0.7 MG/DL (ref 0.5–1.4)
EOSINOPHIL # BLD AUTO: 0.41 K/UL (ref 0–0.51)
EOSINOPHIL NFR BLD: 3.4 % (ref 0–6.9)
ERYTHROCYTE [DISTWIDTH] IN BLOOD BY AUTOMATED COUNT: 54.8 FL (ref 35.9–50)
GFR SERPLBLD CREATININE-BSD FMLA CKD-EPI: 91 ML/MIN/1.73 M 2
GLOBULIN SER CALC-MCNC: 3.3 G/DL (ref 1.9–3.5)
GLUCOSE SERPL-MCNC: 126 MG/DL (ref 65–99)
HCT VFR BLD AUTO: 36.3 % (ref 37–47)
HGB BLD-MCNC: 10.4 G/DL (ref 12–16)
HGB BLD-MCNC: 11.2 G/DL (ref 12–16)
IMM GRANULOCYTES # BLD AUTO: 0.05 K/UL (ref 0–0.11)
IMM GRANULOCYTES NFR BLD AUTO: 0.4 % (ref 0–0.9)
INR PPP: 1 (ref 0.87–1.13)
LIPASE SERPL-CCNC: 19 U/L (ref 11–82)
LYMPHOCYTES # BLD AUTO: 2.54 K/UL (ref 1–4.8)
LYMPHOCYTES NFR BLD: 20.8 % (ref 22–41)
MCH RBC QN AUTO: 26.2 PG (ref 27–33)
MCHC RBC AUTO-ENTMCNC: 30.9 G/DL (ref 32.2–35.5)
MCV RBC AUTO: 85 FL (ref 81.4–97.8)
MONOCYTES # BLD AUTO: 0.67 K/UL (ref 0–0.85)
MONOCYTES NFR BLD AUTO: 5.5 % (ref 0–13.4)
NEUTROPHILS # BLD AUTO: 8.49 K/UL (ref 1.82–7.42)
NEUTROPHILS NFR BLD: 69.3 % (ref 44–72)
NRBC # BLD AUTO: 0 K/UL
NRBC BLD-RTO: 0 /100 WBC (ref 0–0.2)
PLATELET # BLD AUTO: 430 K/UL (ref 164–446)
PMV BLD AUTO: 10.6 FL (ref 9–12.9)
POTASSIUM SERPL-SCNC: 4 MMOL/L (ref 3.6–5.5)
PROT SERPL-MCNC: 7.1 G/DL (ref 6–8.2)
PROTHROMBIN TIME: 13.1 SEC (ref 12–14.6)
RBC # BLD AUTO: 4.27 M/UL (ref 4.2–5.4)
RH BLD: NORMAL
SODIUM SERPL-SCNC: 141 MMOL/L (ref 135–145)
WBC # BLD AUTO: 12.2 K/UL (ref 4.8–10.8)

## 2023-06-04 PROCEDURE — 700105 HCHG RX REV CODE 258: Performed by: EMERGENCY MEDICINE

## 2023-06-04 PROCEDURE — 99223 1ST HOSP IP/OBS HIGH 75: CPT | Performed by: NURSE PRACTITIONER

## 2023-06-04 PROCEDURE — 36415 COLL VENOUS BLD VENIPUNCTURE: CPT

## 2023-06-04 PROCEDURE — 85025 COMPLETE CBC W/AUTO DIFF WBC: CPT

## 2023-06-04 PROCEDURE — 86850 RBC ANTIBODY SCREEN: CPT

## 2023-06-04 PROCEDURE — 71045 X-RAY EXAM CHEST 1 VIEW: CPT

## 2023-06-04 PROCEDURE — A9270 NON-COVERED ITEM OR SERVICE: HCPCS | Performed by: NURSE PRACTITIONER

## 2023-06-04 PROCEDURE — 700102 HCHG RX REV CODE 250 W/ 637 OVERRIDE(OP): Performed by: HOSPITALIST

## 2023-06-04 PROCEDURE — 770020 HCHG ROOM/CARE - TELE (206)

## 2023-06-04 PROCEDURE — 99223 1ST HOSP IP/OBS HIGH 75: CPT | Mod: AI | Performed by: HOSPITALIST

## 2023-06-04 PROCEDURE — 85610 PROTHROMBIN TIME: CPT

## 2023-06-04 PROCEDURE — 86901 BLOOD TYPING SEROLOGIC RH(D): CPT

## 2023-06-04 PROCEDURE — 99999 PR NO CHARGE: CPT | Performed by: INTERNAL MEDICINE

## 2023-06-04 PROCEDURE — 80053 COMPREHEN METABOLIC PANEL: CPT

## 2023-06-04 PROCEDURE — 83690 ASSAY OF LIPASE: CPT

## 2023-06-04 PROCEDURE — 700102 HCHG RX REV CODE 250 W/ 637 OVERRIDE(OP): Performed by: NURSE PRACTITIONER

## 2023-06-04 PROCEDURE — 700117 HCHG RX CONTRAST REV CODE 255: Performed by: EMERGENCY MEDICINE

## 2023-06-04 PROCEDURE — 86900 BLOOD TYPING SEROLOGIC ABO: CPT

## 2023-06-04 PROCEDURE — A9270 NON-COVERED ITEM OR SERVICE: HCPCS | Performed by: HOSPITALIST

## 2023-06-04 PROCEDURE — 85730 THROMBOPLASTIN TIME PARTIAL: CPT

## 2023-06-04 PROCEDURE — 85018 HEMOGLOBIN: CPT

## 2023-06-04 PROCEDURE — 74175 CTA ABDOMEN W/CONTRAST: CPT

## 2023-06-04 PROCEDURE — 99285 EMERGENCY DEPT VISIT HI MDM: CPT

## 2023-06-04 RX ORDER — LEVOTHYROXINE SODIUM 88 UG/1
88 TABLET ORAL
Status: DISCONTINUED | OUTPATIENT
Start: 2023-06-05 | End: 2023-06-05 | Stop reason: HOSPADM

## 2023-06-04 RX ORDER — POLYETHYLENE GLYCOL 3350 17 G/17G
1 POWDER, FOR SOLUTION ORAL
Status: DISCONTINUED | OUTPATIENT
Start: 2023-06-04 | End: 2023-06-05 | Stop reason: HOSPADM

## 2023-06-04 RX ORDER — ONDANSETRON 2 MG/ML
4 INJECTION INTRAMUSCULAR; INTRAVENOUS EVERY 4 HOURS PRN
Status: DISCONTINUED | OUTPATIENT
Start: 2023-06-04 | End: 2023-06-05 | Stop reason: HOSPADM

## 2023-06-04 RX ORDER — AMOXICILLIN 250 MG
2 CAPSULE ORAL 2 TIMES DAILY
Status: DISCONTINUED | OUTPATIENT
Start: 2023-06-04 | End: 2023-06-05 | Stop reason: HOSPADM

## 2023-06-04 RX ORDER — BISACODYL 10 MG
10 SUPPOSITORY, RECTAL RECTAL
Status: DISCONTINUED | OUTPATIENT
Start: 2023-06-04 | End: 2023-06-05 | Stop reason: HOSPADM

## 2023-06-04 RX ORDER — CHOLECALCIFEROL (VITAMIN D3) 125 MCG
10 CAPSULE ORAL NIGHTLY
Status: DISCONTINUED | OUTPATIENT
Start: 2023-06-04 | End: 2023-06-05 | Stop reason: HOSPADM

## 2023-06-04 RX ORDER — OMEPRAZOLE 20 MG/1
20 CAPSULE, DELAYED RELEASE ORAL 2 TIMES DAILY
Status: DISCONTINUED | OUTPATIENT
Start: 2023-06-04 | End: 2023-06-05 | Stop reason: HOSPADM

## 2023-06-04 RX ORDER — ONDANSETRON 4 MG/1
4 TABLET, ORALLY DISINTEGRATING ORAL EVERY 4 HOURS PRN
Status: DISCONTINUED | OUTPATIENT
Start: 2023-06-04 | End: 2023-06-05 | Stop reason: HOSPADM

## 2023-06-04 RX ORDER — ACETAMINOPHEN 325 MG/1
650 TABLET ORAL EVERY 6 HOURS PRN
Status: DISCONTINUED | OUTPATIENT
Start: 2023-06-04 | End: 2023-06-05 | Stop reason: HOSPADM

## 2023-06-04 RX ORDER — SODIUM CHLORIDE, SODIUM LACTATE, POTASSIUM CHLORIDE, CALCIUM CHLORIDE 600; 310; 30; 20 MG/100ML; MG/100ML; MG/100ML; MG/100ML
1000 INJECTION, SOLUTION INTRAVENOUS ONCE
Status: COMPLETED | OUTPATIENT
Start: 2023-06-04 | End: 2023-06-04

## 2023-06-04 RX ADMIN — SODIUM CHLORIDE, POTASSIUM CHLORIDE, SODIUM LACTATE AND CALCIUM CHLORIDE 1000 ML: 600; 310; 30; 20 INJECTION, SOLUTION INTRAVENOUS at 07:33

## 2023-06-04 RX ADMIN — IOHEXOL 100 ML: 350 INJECTION, SOLUTION INTRAVENOUS at 08:29

## 2023-06-04 RX ADMIN — Medication 10 MG: at 20:19

## 2023-06-04 RX ADMIN — OMEPRAZOLE 20 MG: 20 CAPSULE, DELAYED RELEASE ORAL at 20:19

## 2023-06-04 RX ADMIN — OMEPRAZOLE 20 MG: 20 CAPSULE, DELAYED RELEASE ORAL at 14:09

## 2023-06-04 ASSESSMENT — ENCOUNTER SYMPTOMS
DEPRESSION: 0
VOMITING: 0
BLURRED VISION: 0
SHORTNESS OF BREATH: 0
HEARTBURN: 0
NAUSEA: 0
DIZZINESS: 1
FEVER: 0
BLOOD IN STOOL: 1
MEMORY LOSS: 1
WEAKNESS: 0
CONSTIPATION: 0
CHILLS: 0
ABDOMINAL PAIN: 0
DIARRHEA: 0
COUGH: 0
BACK PAIN: 0

## 2023-06-04 ASSESSMENT — COGNITIVE AND FUNCTIONAL STATUS - GENERAL
MOBILITY SCORE: 18
DRESSING REGULAR LOWER BODY CLOTHING: A LITTLE
SUGGESTED CMS G CODE MODIFIER DAILY ACTIVITY: CK
TURNING FROM BACK TO SIDE WHILE IN FLAT BAD: A LITTLE
MOVING FROM LYING ON BACK TO SITTING ON SIDE OF FLAT BED: A LITTLE
WALKING IN HOSPITAL ROOM: A LITTLE
CLIMB 3 TO 5 STEPS WITH RAILING: A LITTLE
DAILY ACTIVITIY SCORE: 19
PERSONAL GROOMING: A LITTLE
MOVING TO AND FROM BED TO CHAIR: A LITTLE
SUGGESTED CMS G CODE MODIFIER MOBILITY: CK
DRESSING REGULAR UPPER BODY CLOTHING: A LITTLE
HELP NEEDED FOR BATHING: A LITTLE
STANDING UP FROM CHAIR USING ARMS: A LITTLE
TOILETING: A LITTLE

## 2023-06-04 ASSESSMENT — LIFESTYLE VARIABLES
ON A TYPICAL DAY WHEN YOU DRINK ALCOHOL HOW MANY DRINKS DO YOU HAVE: 0
HAVE YOU EVER FELT YOU SHOULD CUT DOWN ON YOUR DRINKING: NO
CONSUMPTION TOTAL: NEGATIVE
TOTAL SCORE: 0
DOES PATIENT WANT TO STOP DRINKING: NO
ALCOHOL_USE: NO
HAVE PEOPLE ANNOYED YOU BY CRITICIZING YOUR DRINKING: NO
EVER FELT BAD OR GUILTY ABOUT YOUR DRINKING: NO
TOTAL SCORE: 0
HOW MANY TIMES IN THE PAST YEAR HAVE YOU HAD 5 OR MORE DRINKS IN A DAY: 0
TOTAL SCORE: 0
EVER HAD A DRINK FIRST THING IN THE MORNING TO STEADY YOUR NERVES TO GET RID OF A HANGOVER: NO
AVERAGE NUMBER OF DAYS PER WEEK YOU HAVE A DRINK CONTAINING ALCOHOL: 0

## 2023-06-04 ASSESSMENT — PATIENT HEALTH QUESTIONNAIRE - PHQ9
2. FEELING DOWN, DEPRESSED, IRRITABLE, OR HOPELESS: NOT AT ALL
SUM OF ALL RESPONSES TO PHQ9 QUESTIONS 1 AND 2: 0
1. LITTLE INTEREST OR PLEASURE IN DOING THINGS: NOT AT ALL
1. LITTLE INTEREST OR PLEASURE IN DOING THINGS: NOT AT ALL
SUM OF ALL RESPONSES TO PHQ9 QUESTIONS 1 AND 2: 0

## 2023-06-04 ASSESSMENT — FIBROSIS 4 INDEX
FIB4 SCORE: 0.79
FIB4 SCORE: 2.04
FIB4 SCORE: 0.79

## 2023-06-04 NOTE — H&P
Hospital Medicine History & Physical Note    Date of Service  6/4/2023    Primary Care Physician  Wily Garcia M.D.    Consultants  GI    Specialist Names: Condron    Code Status  DNAR/DNI    Chief Complaint  Chief Complaint   Patient presents with    Lower GI Bleed    Dizziness       History of Presenting Illness  Jayshree Reinoso is a 72 y.o. female who presented 6/4/2023 with bright red blood per rectum.  Ms. Reinoso has a past medical history of dementia and hypothyroidism that has had multiple admissions for diverticular bleeding including 2016, 2018, 2 admissions in 2019, and most recently admission in November 2022 at which time she received 6 units of packed red blood cells and colonoscopy was negative for source of bleeding though the presumptive diagnosis was diverticular bleed at that time.  This morning she had bright red blood per rectum and her  brought her in.  CTA is negative for extravasation.  Hemoglobin is 11.  She is a poor historian given her advanced dementia.  She has been taking aspirin at night though was not NSAIDs or blood thinners otherwise.  GI has been consulted from the emergency room.  She will be admitted for serial hemoglobins and transfusion for less than 7 and may be a candidate for embolization by interventional radiology of her condition worsens.    I discussed the plan of care with her  at bedside. She was the source of her history and review of systems and confirmed DNR/DNI status.    Review of Systems  Review of Systems   Unable to perform ROS: Dementia       Past Medical History   has a past medical history of Alzheimer's disease (HCC), Alzheimer's disease (HCC), Anesthesia, Arthritis, Backpain, Cancer (HCC) (2010), Hypothyroid, Other specified symptom associated with female genital organs, Pain, Renal disorder, Unspecified disorder of thyroid, and Unspecified urinary incontinence.    Surgical History   has a past surgical history that includes scleral  buckling (7/9/08); rectus repair (10/9/08); hernia repair; inguinal hernia repair (12/18/2008); laparoscopy (5/7/2009); femoral hernia repair (5/7/2009); fusion, spine, lumbar, plif (2/27/2013); lumbar laminectomy diskectomy (2/27/2013); gyn surgery; vaginectomy (7/25/2011); gyn surgery; gyn surgery; knee arthroplasty total (Right, 7/11/2016); gastroscopy-endo (8/11/2016); gastroscopy-endo (N/A, 7/26/2019); colonoscopy - endo (N/A, 7/27/2019); and pr colonoscopy,diagnostic (N/A, 11/5/2022).     Family History  family history is not on file.   Family history reviewed with patient. There is no family history that is pertinent to the chief complaint.     Social History   reports that she has never smoked. She has never used smokeless tobacco. She reports that she does not drink alcohol and does not use drugs.    Allergies  No Known Allergies    Medications  Prior to Admission Medications   Prescriptions Last Dose Informant Patient Reported? Taking?   B Complex Vitamins (VITAMIN B COMPLEX PO) 6/3/2023 at AM Significant Other Yes No   Sig: Take 1 Tablet by mouth every day.   Coenzyme Q10 (CO Q 10 PO) 6/3/2023 at AM Significant Other Yes No   Sig: Take 1 Capsule by mouth every day.   Melatonin 10 MG Tab 6/3/2023 at PM Significant Other Yes No   Sig: Take 10 mg by mouth every evening.   levothyroxine (SYNTHROID) 88 MCG Tab 6/4/2023 at AM Significant Other Yes No   Sig: Take 88 mcg by mouth every morning on an empty stomach.      Facility-Administered Medications: None       Physical Exam  Temp:  [35.6 °C (96 °F)-36.2 °C (97.2 °F)] 36.2 °C (97.2 °F)  Pulse:  [71-88] 71  Resp:  [14-19] 16  BP: (101-136)/(71-78) 136/77  SpO2:  [93 %-95 %] 93 %  Blood Pressure : 136/77   Temperature: 36.2 °C (97.2 °F)   Pulse: 71   Respiration: 16   Pulse Oximetry: 93 %       Physical Exam  Vitals and nursing note reviewed.   Constitutional:       General: She is not in acute distress.     Appearance: She is not toxic-appearing.    Cardiovascular:      Rate and Rhythm: Normal rate and regular rhythm.   Pulmonary:      Effort: Pulmonary effort is normal.      Breath sounds: Normal breath sounds.   Abdominal:      General: There is no distension.      Palpations: Abdomen is soft.      Tenderness: There is no abdominal tenderness.   Neurological:      Comments: Poor historian   Psychiatric:         Behavior: Behavior normal.         Laboratory:  Recent Labs     06/04/23 0606   WBC 12.2*   RBC 4.27   HEMOGLOBIN 11.2*   HEMATOCRIT 36.3*   MCV 85.0   MCH 26.2*   MCHC 30.9*   RDW 54.8*   PLATELETCT 430   MPV 10.6     Recent Labs     06/04/23 0606   SODIUM 141   POTASSIUM 4.0   CHLORIDE 109   CO2 18*   GLUCOSE 126*   BUN 29*   CREATININE 0.70   CALCIUM 8.8     Recent Labs     06/04/23 0606   ALTSGPT 18   ASTSGOT 20   ALKPHOSPHAT 87   TBILIRUBIN <0.2   LIPASE 19   GLUCOSE 126*     Recent Labs     06/04/23 0606   APTT 22.4*   INR 1.00     No results for input(s): NTPROBNP in the last 72 hours.      No results for input(s): TROPONINT in the last 72 hours.    Imaging:  CT-CTA ABDOMEN WITH & W/O-POST PROCESS   Final Result      There is motion artifact, limiting evaluation.      1. No evidence of active contrast extravasation seen in the bowel.      2. Sigmoid diverticulosis.      DX-CHEST-PORTABLE (1 VIEW)   Final Result         1.  No acute cardiopulmonary disease.   2.  Atherosclerosis              Assessment/Plan:  Justification for Admission Status  I anticipate this patient will require at least two midnights for appropriate medical management, necessitating inpatient admission because serial hemoglobins with orders to transfuse in the setting of a brisk lower GI bleed    Patient will need a Telemetry bed on MEDICAL service .  The need is secondary to as above.    * Lower GI bleed- (present on admission)  Assessment & Plan  This is her 4th admission for a lower GI bleed consistent with a diverticular bleed  She was admitted in November 2022 and  had a colonoscopy due to this diagnosis.  CTA in the ER is negative for extravasation.   GI consulted  Consider repeat stat CTA and IR consultation if she has a drop in blood pressure with tachycardia on telemetry monitoring with evidence of worsening GI bleed. She may be a candidate for surgical resection as an outpatient due to recurrent admissions.   Her  has been giving her an OTC headache medicine at night that contains aspirin which will be stopped.     Acute blood loss anemia- (present on admission)  Assessment & Plan  Hb is low at 11  Trend hemoglobin q8 hours and transfuse for hemoglobin less than 7    DNR (do not resuscitate)- (present on admission)  Assessment & Plan  As discussed with her     Dementia (HCC)- (present on admission)  Assessment & Plan  Advanced     Hypothyroid- (present on admission)  Assessment & Plan  Continue synthroid        VTE prophylaxis: SCDs/TEDs

## 2023-06-04 NOTE — ED PROVIDER NOTES
ED Provider Note    CHIEF COMPLAINT  Chief Complaint   Patient presents with    Lower GI Bleed    Dizziness       EXTERNAL RECORDS REVIEWED  Inpatient Notes discharge summary from November 2022 reviewed    HPI/ROS  LIMITATION TO HISTORY   Select: dementia  OUTSIDE HISTORIAN(S):  Significant other     Jayshree Reinoso is a 72 y.o. female with history of diverticulosis and GI bleed of unknown etiology who presents for BRB per rectum.    Patient's  gives most of the history.  He states that the patient had acute onset of a large amount of bright red blood per rectum, in the toilet, and in her diaper this morning.  She has been complaining of lightheadedness.    Patient had similar symptoms in November.  GI saw the patient and could not find a source of the bleeding.    No vomiting, no abdominal pain, no syncope, no fever, no chills.    No anticoags or ASA    Take Prilosec.      PAST MEDICAL HISTORY   has a past medical history of Alzheimer's disease (HCC), Alzheimer's disease (HCC), Anesthesia, Arthritis, Backpain, Cancer (HCC) (2010), Hypothyroid, Other specified symptom associated with female genital organs, Pain, Renal disorder, Unspecified disorder of thyroid, and Unspecified urinary incontinence.    SURGICAL HISTORY   has a past surgical history that includes scleral buckling (7/9/08); rectus repair (10/9/08); hernia repair; inguinal hernia repair (12/18/2008); laparoscopy (5/7/2009); femoral hernia repair (5/7/2009); fusion, spine, lumbar, plif (2/27/2013); lumbar laminectomy diskectomy (2/27/2013); gyn surgery; vaginectomy (7/25/2011); gyn surgery; gyn surgery; knee arthroplasty total (Right, 7/11/2016); gastroscopy-endo (8/11/2016); gastroscopy-endo (N/A, 7/26/2019); colonoscopy - endo (N/A, 7/27/2019); and colonoscopy,diagnostic (N/A, 11/5/2022).    FAMILY HISTORY  No family history on file.    SOCIAL HISTORY  Social History     Tobacco Use    Smoking status: Never    Smokeless tobacco: Never  "  Substance and Sexual Activity    Alcohol use: No    Drug use: No    Sexual activity: Not Currently       CURRENT MEDICATIONS  Home Medications       Reviewed by Surjit Hough (Pharmacy Tech) on 06/04/23 at 0915  Med List Status: Complete     Medication Last Dose Status   B Complex Vitamins (VITAMIN B COMPLEX PO) 6/3/2023 Active   Coenzyme Q10 (CO Q 10 PO) 6/3/2023 Active   levothyroxine (SYNTHROID) 88 MCG Tab 6/4/2023 Active   Melatonin 10 MG Tab 6/3/2023 Active                    ALLERGIES  No Known Allergies    PHYSICAL EXAM  VITAL SIGNS: /77   Pulse 71   Temp 36.2 °C (97.2 °F)   Resp 16   Ht 1.626 m (5' 4\")   Wt 59 kg (130 lb)   SpO2 93%   BMI 22.31 kg/m²    General:  WDWN elderly female, nontoxic but fatigued appearing; V/S as above; afebrile, not tachycardic or hypotensive  Skin: warm and dry; slightly pale color; no rash  HEENT: NCAT; EOMs intact; PERRL; no scleral icterus   Neck: FROM; no JVD  Cardiovascular: Regular heart rate and rhythm.  No murmurs, rubs, or gallops; pulses 2+ bilaterally radially and DP areas  Lungs: No respiratory distress or tachypnea; Clear to auscultation with good air movement bilaterally.  No wheezes, rhonchi, or rales.   Abdomen: BS present; soft; NTND; no rebound, guarding, or rigidity.  No organomegaly or pulsatile mass  Extremities: CEVALLOS x 4; no e/o trauma; no pedal edema  Neurologic: CNs III-XII grossly intact; speech clear  Psychiatric: Flat affect, normal mood      DIAGNOSTIC STUDIES / PROCEDURES  EKG  I have independently interpreted this EKG. no acute ST changes.    LABS  Results for orders placed or performed during the hospital encounter of 06/04/23   CBC with Differential   Result Value Ref Range    WBC 12.2 (H) 4.8 - 10.8 K/uL    RBC 4.27 4.20 - 5.40 M/uL    Hemoglobin 11.2 (L) 12.0 - 16.0 g/dL    Hematocrit 36.3 (L) 37.0 - 47.0 %    MCV 85.0 81.4 - 97.8 fL    MCH 26.2 (L) 27.0 - 33.0 pg    MCHC 30.9 (L) 32.2 - 35.5 g/dL    RDW 54.8 (H) 35.9 - 50.0 " fL    Platelet Count 430 164 - 446 K/uL    MPV 10.6 9.0 - 12.9 fL    Neutrophils-Polys 69.30 44.00 - 72.00 %    Lymphocytes 20.80 (L) 22.00 - 41.00 %    Monocytes 5.50 0.00 - 13.40 %    Eosinophils 3.40 0.00 - 6.90 %    Basophils 0.60 0.00 - 1.80 %    Immature Granulocytes 0.40 0.00 - 0.90 %    Nucleated RBC 0.00 0.00 - 0.20 /100 WBC    Neutrophils (Absolute) 8.49 (H) 1.82 - 7.42 K/uL    Lymphs (Absolute) 2.54 1.00 - 4.80 K/uL    Monos (Absolute) 0.67 0.00 - 0.85 K/uL    Eos (Absolute) 0.41 0.00 - 0.51 K/uL    Baso (Absolute) 0.07 0.00 - 0.12 K/uL    Immature Granulocytes (abs) 0.05 0.00 - 0.11 K/uL    NRBC (Absolute) 0.00 K/uL   COD (ADULT)   Result Value Ref Range    ABO Grouping Only O     Rh Grouping Only POS     Antibody Screen-Cod NEG    COMP METABOLIC PANEL   Result Value Ref Range    Sodium 141 135 - 145 mmol/L    Potassium 4.0 3.6 - 5.5 mmol/L    Chloride 109 96 - 112 mmol/L    Co2 18 (L) 20 - 33 mmol/L    Anion Gap 14.0 7.0 - 16.0    Glucose 126 (H) 65 - 99 mg/dL    Bun 29 (H) 8 - 22 mg/dL    Creatinine 0.70 0.50 - 1.40 mg/dL    Calcium 8.8 8.5 - 10.5 mg/dL    AST(SGOT) 20 12 - 45 U/L    ALT(SGPT) 18 2 - 50 U/L    Alkaline Phosphatase 87 30 - 99 U/L    Total Bilirubin <0.2 0.1 - 1.5 mg/dL    Albumin 3.8 3.2 - 4.9 g/dL    Total Protein 7.1 6.0 - 8.2 g/dL    Globulin 3.3 1.9 - 3.5 g/dL    A-G Ratio 1.2 g/dL   LIPASE   Result Value Ref Range    Lipase 19 11 - 82 U/L   PROTHROMBIN TIME   Result Value Ref Range    PT 13.1 12.0 - 14.6 sec    INR 1.00 0.87 - 1.13   APTT   Result Value Ref Range    APTT 22.4 (L) 24.7 - 36.0 sec   CORRECTED CALCIUM   Result Value Ref Range    Correct Calcium 9.0 8.5 - 10.5 mg/dL   ESTIMATED GFR   Result Value Ref Range    GFR (CKD-EPI) 91 >60 mL/min/1.73 m 2         RADIOLOGY  Radiologist interpretation:   CT-CTA ABDOMEN WITH & W/O-POST PROCESS   Final Result      There is motion artifact, limiting evaluation.      1. No evidence of active contrast extravasation seen in the bowel.       2. Sigmoid diverticulosis.      DX-CHEST-PORTABLE (1 VIEW)   Final Result         1.  No acute cardiopulmonary disease.   2.  Atherosclerosis            COURSE & MEDICAL DECISION MAKING    ED Observation Status? Yes; I am placing the patient in to an observation status due to a diagnostic uncertainty as well as therapeutic intensity. Patient placed in observation status at 8:54 AM, 6/4/2023.     Observation plan is as follows: Labs, imaging, monitoring hemodynamic stability/status, transfusion as needed    Upon Reevaluation, the patient's condition has: not improved; and will be escalated to hospitalization.    Patient discharged from ED Observation status at 8:54 AM   (Time) 6/4/23 (Date).     INITIAL ASSESSMENT, COURSE AND PLAN  Care Narrative: 71 y/o with BRB per rectum.  Patient is not tachycardic or hypotensive appears fatigued and pale.  Abdomen is soft and nontender.  Patient has history of diverticulosis.  Perhaps diverticular bleed.  However, this was not confirmed when she had similar symptoms in November.  Patient required transfusions at that time.    Patient's labs demonstrate normal INR, hemoglobin 11.2 which is stable, normal platelets, dehydration with a CO2 of 18 and a BUN of 29 which could be indicative of either dehydration or upper GI bleed.    CTA demonstrates no obvious bleeding source.  Motion artifact noted by radiology.    No indication for transfusion at this time.  Patient hemodynamically stable here in the ER and hemoglobin stable.    8:54 AM  Paging hospitalist and GI    9:16 AM  GI NP will come evaluate the patient    The total critical care time spent caring for this patient totaled 35 minutes due to the high probability of imminent deterioration, life-threatening condition, threatened life/limb/vital organ function.  This time included frequent reassessments and intervening with necessary action such as fluid bolus, having conversation consultant, and speaking with the admitting  physician.      DISPOSITION AND DISCUSSIONS  I have discussed management of the patient with the following physicians and CONRADO's:    Gastroenterology  Hospitalist    FINAL DIAGNOSIS  1. Gastrointestinal hemorrhage, unspecified gastrointestinal hemorrhage type        Electronically signed by: Rashida Barnes M.D., 6/4/2023 6:07 AM

## 2023-06-04 NOTE — CONSULTS
..Date of Consultation:  6/4/2023    Patient: : Jayshree Reinoso  MRN: [id number]    Referring Physician:  Dr. Rashida Barnes     GI:MADONNA Lin     Reason for Consultation: BRBPR    History of Present Illness: jayshree Reinoso is a 72 year old female with past medical history significant for dementia and recurrent GI bleeding.  History obtained from her  at bedside.  He reports that up until yesterday, patient was in her usual state of health and they were making breakfast at home.  Suddenly she disappeared to the bathroom and he found out that she had several large bloody bowel movements.  He cleaned her up and then she also developed further bleeding in her adult diaper so he brought her to the ED.  Patient's only complaint was dizziness, however patient patient's  remarks that she has been complaining of this for over a week.  Last bloody bowel movement was 5 AM this morning.  She has no hematemesis, no melena, no dysphagia, no weight loss.  The last time this happened was in November.  She does not take any blood thinners, anticoagulation, only the occasional aspirin for headache.    Patient is currently afebrile and hemodynamically stable on room air.  Hemoglobin is 11.2 which seems to be her baseline per chart review.  BUN is 29.  CTA was negative for site of active bleeding but is consistent with sigmoid diverticulosis.    GI History:  Admitted in November 2022 with similar complaints.  Attempted to do colonoscopy on 11/2/2022 however patient became unstable and required admission to the ICU secondary to prep for the colonoscopy.  She stabilized colonoscopy was completed on 11/5 which revealed diverticulosis throughout the whole colon both right and left side.  EGD in 7/2019 revealed hiatal hernia with antrum inflammation and suspicion for duodenal ulcer    Patient has had hematochezia in August 2016, July 2018, July 2019 with several flexible sigmoidoscopies /colonoscopies all  revealing diverticulosis.  No site of active bleeding was ever found.    Past Medical History:   Diagnosis Date    Cancer (HCC) 2010    skin melanoma    Alzheimer's disease (HCC)     Alzheimer's disease (HCC)     Anesthesia     PONV    Arthritis     RA and osteo    Backpain     Hypothyroid     Other specified symptom associated with female genital organs     vaginal mesh removed    Pain     bilateral knee    Renal disorder     renal fatigue    Unspecified disorder of thyroid     Unspecified urinary incontinence          Past Surgical History:   Procedure Laterality Date    UT COLONOSCOPY,DIAGNOSTIC N/A 11/5/2022    Procedure: COLONOSCOPY;  Surgeon: Charli Cortes M.D.;  Location: SURGERY [facility];  Service: Gastroenterology    COLONOSCOPY - ENDO N/A 7/27/2019    Procedure: COLONOSCOPY;  Surgeon: Guru Castañeda M.D.;  Location: ENDOSCOPY [facility];  Service: Gastroenterology    GASTROSCOPY-ENDO N/A 7/26/2019    Procedure: GASTROSCOPY;  Surgeon: Guru Castañeda M.D.;  Location: ENDOSCOPY [facility];  Service: Gastroenterology    GASTROSCOPY-ENDO  8/11/2016    Procedure: GASTROSCOPY-ENDO;  Surgeon: Ruchi Akers M.D.;  Location: SURGERY [facility];  Service:     KNEE ARTHROPLASTY TOTAL Right 7/11/2016    Procedure: KNEE ARTHROPLASTY TOTAL;  Surgeon: Rayray Valentine M.D.;  Location: SURGERY [facility];  Service:     FUSION, SPINE, LUMBAR, PLIF  2/27/2013    Performed by Sonny Flores M.D. at SURGERY [facility]    LUMBAR LAMINECTOMY DISKECTOMY  2/27/2013    Performed by Sonny Flores M.D. at SURGERY [facility] ORS    VAGINECTOMY  7/25/2011    Performed by MICHELLE BURT at SURGERY [facility]    LAPAROSCOPY  5/7/2009    Performed by RODY SAENZ at SURGERY [facility] ORS    FEMORAL HERNIA REPAIR  5/7/2009    Performed by RODY SAENZ at SURGERY [facility] ORS    INGUINAL HERNIA REPAIR  12/18/2008    Performed by RODY SAENZ at SURGERY SAME DAY  "[facility] ORS    RECTUS REPAIR  10/9/08    Performed by EFREN REYNOLDS at SURGERY [facility] ORS    SCLERAL BUCKLING  7/9/08    Performed by JORGE DAVENPORT at SURGERY [facility] ORS    GYN SURGERY      GYN SURGERY      vaginal mesh removed    GYN SURGERY      hysterectomy    HERNIA REPAIR         No family history on file.    Social History     Socioeconomic History    Marital status:    Tobacco Use    Smoking status: Never    Smokeless tobacco: Never   Substance and Sexual Activity    Alcohol use: No    Drug use: No    Sexual activity: Not Currently       Review of systems:  Review of Systems   Constitutional:  Negative for chills, fever and malaise/fatigue.   HENT:  Negative for hearing loss.    Eyes:  Negative for blurred vision.   Respiratory:  Negative for cough and shortness of breath.    Cardiovascular:  Negative for chest pain and leg swelling.   Gastrointestinal:  Positive for blood in stool. Negative for abdominal pain, constipation, diarrhea, heartburn, melena, nausea and vomiting.   Genitourinary:  Negative for dysuria.   Musculoskeletal:  Negative for back pain.   Skin:  Negative for rash.   Neurological:  Positive for dizziness. Negative for weakness.   Psychiatric/Behavioral:  Positive for memory loss. Negative for depression.    All other systems reviewed and are negative.        Physical Exam:  Vitals:    06/04/23 0534 06/04/23 0541 06/04/23 0600 06/04/23 0723   BP: 101/71  116/78 136/77   Pulse: 88  80 71   Resp: 14  19 16   Temp: (!) 35.6 °C (96 °F)  36.2 °C (97.2 °F)    TempSrc: Temporal      SpO2: 93%  95% 93%   Weight:  59 kg (130 lb)     Height:  1.626 m (5' 4\")         Physical Exam  Vitals and nursing note reviewed.   Constitutional:       General: She is not in acute distress.     Appearance: Normal appearance. She is not ill-appearing.   HENT:      Head: Normocephalic and atraumatic.      Right Ear: External ear normal.      Left Ear: External ear normal.      " Nose: Nose normal.      Mouth/Throat:      Mouth: Mucous membranes are dry.      Pharynx: Oropharynx is clear.   Eyes:      General: No scleral icterus.  Cardiovascular:      Rate and Rhythm: Normal rate and regular rhythm.      Pulses: Normal pulses.      Heart sounds: Normal heart sounds.   Pulmonary:      Effort: Pulmonary effort is normal. No respiratory distress.      Breath sounds: Normal breath sounds.   Abdominal:      General: Abdomen is flat. Bowel sounds are normal. There is no distension.      Palpations: Abdomen is soft.      Tenderness: There is no abdominal tenderness.   Musculoskeletal:         General: Normal range of motion.      Cervical back: Normal range of motion and neck supple.   Skin:     General: Skin is warm and dry.      Capillary Refill: Capillary refill takes less than 2 seconds.      Coloration: Skin is pale.   Neurological:      Mental Status: She is alert. Mental status is at baseline.      Comments: History of dementia   Psychiatric:         Mood and Affect: Mood normal.         Behavior: Behavior normal.           Labs:  Recent Labs     06/04/23  0606   WBC 12.2*   RBC 4.27   HEMOGLOBIN 11.2*   HEMATOCRIT 36.3*   MCV 85.0   MCH 26.2*   MCHC 30.9*   RDW 54.8*   PLATELETCT 430   MPV 10.6     Recent Labs     06/04/23  0606   SODIUM 141   POTASSIUM 4.0   CHLORIDE 109   CO2 18*   GLUCOSE 126*   BUN 29*     Recent Labs     06/04/23  0606   APTT 22.4*   INR 1.00       Recent Labs     06/04/23  0606   ASTSGOT 20   ALTSGPT 18   TBILIRUBIN <0.2   ALKPHOSPHAT 87   GLOBULIN 3.3   INR 1.00         Imaging:  CT-CTA ABDOMEN WITH & W/O-POST PROCESS  Narrative: 6/4/2023 8:03 AM    HISTORY/REASON FOR EXAM:  lower gi bleed; h/o diverticulosis  Bloody stool    TECHNIQUE/EXAM DESCRIPTION:  CT angiogram of the abdomen without and with contrast, with reconstructions.    Initial precontrast images were obtained from the diaphragmatic domes through the iliac crests. Following this, 100 mL of Omnipaque 350  nonionic contrast was administered at 5.0 mL/sec and helical scanning obtained from the diaphragmatic domes through   the iliac crests. Thin- and thick-section multiplanar volume reformats were generated from the axial data set in the sagittal and coronal planes.    3D angiographic images were reviewed on PACS. Maximum intensity projection (MIP) images were generated and reviewed.    Low dose optimization technique was utilized for this CT exam including automated exposure control and adjustment of the mA and/or kV according to patient size.    COMPARISON:  None available.    FINDINGS:    Aorta and vasculature:  Noncontrast images: There is no intramural hematoma.  Contrast images:  No evidence of aortic aneurysm or dissection. No contrast extravasation seen.    Moderate atherosclerotic disease of the abdominal aorta and its branches.    Patent celiac trunk, SMA and ANABELLE. Patent bilateral renal arteries. Moderate atherosclerotic plaque in the bilateral iliac arteries.    Lung bases:    No pulmonary nodules at the lung bases. No pleural or pericardial fluid.    Small hiatal hernia.    Abdomen:    Limited exam due to motion artifact    Liver: No obvious mass.    Spleen: Unremarkable.    Pancreas: Unremarkable.    Gallbladder: Surgically absent.    Adrenal glands: normal in size.    Kidneys: Unremarkable. The kidneys enhance symmetrically.    There is no lymphadenopathy.    No bowel wall thickening or bowel dilatation.    Multiple sigmoid diverticula.    No obvious abnormality seen in the pelvic structures but evaluation limited on CT.    No lymph node enlargement, free fluid, or free air in the abdomen or pelvis.    Severe degenerative change of the lumbar spine.    No aggressive osseous lesion.    3D angiographic/MIP images of the vasculature confirm the vascular findings as described above.  Impression: There is motion artifact, limiting evaluation.    1. No evidence of active contrast extravasation seen in the  bowel.    2. Sigmoid diverticulosis.  DX-CHEST-PORTABLE (1 VIEW)  Narrative:   6/4/2023 6:03 AM    HISTORY/REASON FOR EXAM: Blood in vomit or stool or dark tarry stool    TECHNIQUE/EXAM DESCRIPTION:  Single AP view of the chest.    COMPARISON: November 2, 2022    FINDINGS:    Overlying cardiac leads are present. The cardiac silhouette appears within normal limits.    Atherosclerotic calcification of the aorta is noted.  The central pulmonary vasculature appears normal.    The lungs appear well expanded bilaterally.  Bilateral lungs are clear.    No significant pleural effusions are identified.    The bony structures appear age-appropriate.  Impression: 1.  No acute cardiopulmonary disease.  2.  Atherosclerosis            Impressions:  Hematochezia  Acute blood loss anemia  Sigmoid diverticulosis  History of GI bleeding  Dementia    MDM:  Patient is a 72-year-old female with history of dementia and diverticulosis who presents with acute onset of hematochezia.  Vital signs are stable, hemoglobin is 11.2 which appears to be her baseline.  CTA unable to determine site of bleeding, consistent with sigmoid diverticulosis.    Recommendations:  After extensive discussion with patient and her , we have elected to conservatively manage her diverticular bleeding at this time.  He rightfully expressed concerns about what happened during her last hospitalization when she prepped and became unstable requiring ICU admission.  Given that they never did find an active site of bleeding even after prep, he is agreeable to watchful waiting.  Continue PPI twice daily  Trend hemoglobin and transfuse for less than 7  Bleeding seems to be slowing down   If she develops active bleeding please obtain stat CTA and consult IR    GI team will continue to follow    Plan of care discussed with patient, her , Dr. Garcia    This note was generated using voice recognition software which has a small chance of producing errors of  grammar and possibly content. I have made every reasonable attempt to find and correct any obvious errors, but expect that some may not be found prior to finalization of this note.

## 2023-06-04 NOTE — ED NOTES
Report received from NOC RN. POC discussed. Patient remains on monitoring. VSS.  at bedside. Patient does not respond to questions from this RN at this time; however, speaking to . VSS. Per , patient has hx of dementia.

## 2023-06-04 NOTE — ASSESSMENT & PLAN NOTE
This is her 4th admission for a lower GI bleed consistent with a diverticular bleed  She was admitted in November 2022 and had a colonoscopy due to this diagnosis.  CTA in the ER is negative for extravasation.   GI consulted  Consider repeat stat CTA and IR consultation if she has a drop in blood pressure with tachycardia on telemetry monitoring with evidence of worsening GI bleed. She may be a candidate for surgical resection as an outpatient due to recurrent admissions.   Her  has been giving her an OTC headache medicine at night that contains aspirin which will be stopped.

## 2023-06-04 NOTE — ED TRIAGE NOTES
72 yr patient walked in to triage from home with above complaint. Per  patient wares adult dipper and it was full of blood this morning. Patient does have a history of GI bleeds. Patient also has early stage of dementia.

## 2023-06-05 ENCOUNTER — PHARMACY VISIT (OUTPATIENT)
Dept: PHARMACY | Facility: MEDICAL CENTER | Age: 73
End: 2023-06-05
Payer: MEDICARE

## 2023-06-05 VITALS
HEART RATE: 82 BPM | DIASTOLIC BLOOD PRESSURE: 87 MMHG | HEIGHT: 64 IN | RESPIRATION RATE: 16 BRPM | TEMPERATURE: 98.6 F | WEIGHT: 134.31 LBS | SYSTOLIC BLOOD PRESSURE: 122 MMHG | BODY MASS INDEX: 22.93 KG/M2 | OXYGEN SATURATION: 96 %

## 2023-06-05 LAB
APPEARANCE UR: CLEAR
BACTERIA #/AREA URNS HPF: NEGATIVE /HPF
BILIRUB UR QL STRIP.AUTO: NEGATIVE
COLOR UR: YELLOW
EPI CELLS #/AREA URNS HPF: NEGATIVE /HPF
GLUCOSE UR STRIP.AUTO-MCNC: NEGATIVE MG/DL
HGB BLD-MCNC: 9.8 G/DL (ref 12–16)
HGB BLD-MCNC: 9.9 G/DL (ref 12–16)
HYALINE CASTS #/AREA URNS LPF: ABNORMAL /LPF
KETONES UR STRIP.AUTO-MCNC: NEGATIVE MG/DL
LEUKOCYTE ESTERASE UR QL STRIP.AUTO: ABNORMAL
MICRO URNS: ABNORMAL
NITRITE UR QL STRIP.AUTO: NEGATIVE
PH UR STRIP.AUTO: 6.5 [PH] (ref 5–8)
PROT UR QL STRIP: NEGATIVE MG/DL
RBC # URNS HPF: ABNORMAL /HPF
RBC UR QL AUTO: ABNORMAL
SP GR UR STRIP.AUTO: 1.04
UROBILINOGEN UR STRIP.AUTO-MCNC: 0.2 MG/DL
WBC #/AREA URNS HPF: ABNORMAL /HPF

## 2023-06-05 PROCEDURE — 700102 HCHG RX REV CODE 250 W/ 637 OVERRIDE(OP): Performed by: NURSE PRACTITIONER

## 2023-06-05 PROCEDURE — 99239 HOSP IP/OBS DSCHRG MGMT >30: CPT | Performed by: INTERNAL MEDICINE

## 2023-06-05 PROCEDURE — 99232 SBSQ HOSP IP/OBS MODERATE 35: CPT | Performed by: NURSE PRACTITIONER

## 2023-06-05 PROCEDURE — A9270 NON-COVERED ITEM OR SERVICE: HCPCS | Performed by: NURSE PRACTITIONER

## 2023-06-05 PROCEDURE — 85018 HEMOGLOBIN: CPT | Mod: 91

## 2023-06-05 PROCEDURE — RXMED WILLOW AMBULATORY MEDICATION CHARGE: Performed by: INTERNAL MEDICINE

## 2023-06-05 PROCEDURE — A9270 NON-COVERED ITEM OR SERVICE: HCPCS | Performed by: HOSPITALIST

## 2023-06-05 PROCEDURE — 81001 URINALYSIS AUTO W/SCOPE: CPT

## 2023-06-05 PROCEDURE — 700102 HCHG RX REV CODE 250 W/ 637 OVERRIDE(OP): Performed by: HOSPITALIST

## 2023-06-05 RX ORDER — OMEPRAZOLE 20 MG/1
20 CAPSULE, DELAYED RELEASE ORAL 2 TIMES DAILY
Qty: 60 CAPSULE | Refills: 0 | Status: SHIPPED | OUTPATIENT
Start: 2023-06-05

## 2023-06-05 RX ADMIN — OMEPRAZOLE 20 MG: 20 CAPSULE, DELAYED RELEASE ORAL at 05:04

## 2023-06-05 RX ADMIN — LEVOTHYROXINE SODIUM 88 MCG: 88 TABLET ORAL at 05:04

## 2023-06-05 ASSESSMENT — ENCOUNTER SYMPTOMS
BACK PAIN: 0
DEPRESSION: 0
HEARTBURN: 0
ABDOMINAL PAIN: 0
CONSTIPATION: 0
DIARRHEA: 0
NAUSEA: 0
WEAKNESS: 0
COUGH: 0
SHORTNESS OF BREATH: 0
DIZZINESS: 0
CHILLS: 0
BLURRED VISION: 0
FEVER: 0
VOMITING: 0
BLOOD IN STOOL: 0

## 2023-06-05 NOTE — PROGRESS NOTES
Patient is alert and oriented x1. On room air. No signs of distress. Purewick in place. All questions and needs met. Call light within reach. Bed at lowest position.

## 2023-06-05 NOTE — PROGRESS NOTES
4 Eyes Skin Assessment Completed by KG Trevizo, RN and Faby Coelho, ACT.    Head WDL  Ears WDL  Nose WDL  Mouth WDL  Neck WDL  Breast/Chest WDL  Shoulder Blades WDL  Spine WDL  (R) Arm/Elbow/Hand WDL  (L) Arm/Elbow/Hand WDL  Abdomen WDL  Groin WDL  Scrotum/Coccyx/Buttocks WDL  (R) Leg WDL  (L) Leg WDL  (R) Heel/Foot/Toe Scab  (L) Heel/Foot/Toe WDL          Devices In Places Tele Box and Pulse Ox      Interventions In Place Pillows, Q2 Turns, Barrier Cream, and Heels Loaded W/Pillows    Possible Skin Injury Yes    Pictures Uploaded Into Epic Yes  Wound Consult Placed N/A  RN Wound Prevention Protocol Ordered Yes

## 2023-06-05 NOTE — PROGRESS NOTES
Discharge instructions reviewed and signed, all questions answered, PIVs removed, Meds to Beds delivered.

## 2023-06-05 NOTE — CARE PLAN
The patient is Stable - Low risk of patient condition declining or worsening    Shift Goals  Clinical Goals: monitor h/h  Patient Goals: rest  Family Goals: no active bleed    Progress made toward(s) clinical / shift goals:        Problem: Skin Integrity  Goal: Skin integrity is maintained or improved  6/5/2023 0236 by Wendy Luo R.N.  Outcome: Progressing  6/5/2023 0235 by Wendy Luo R.N.  Outcome: Progressing     Problem: Fall Risk  Goal: Patient will remain free from falls  6/5/2023 0236 by Wendy Luo R.N.  Outcome: Progressing  6/5/2023 0235 by Wendy Luo R.N.  Outcome: Progressing     Problem: Risk for Bleeding  Goal: Patient will not experience bleeding as evidenced by normal blood pressure, stable hematocrit and hemoglobin levels and desired ranges for coagulation profiles  Outcome: Progressing       Patient is not progressing towards the following goals:

## 2023-06-05 NOTE — PROGRESS NOTES
.Gastroenterology Progress Note               Author:  MADONNA Lin Date & Time Created: 6/5/2023 11:53 AM       Patient ID:  Name:             Jayshree Reinoso  YOB: 1950  Age:                 72 y.o.  female  MRN:               [id number]        Medical Decision Making, by Problem:  Active Hospital Problems    Diagnosis     Lower GI bleed [K92.2]     Acute blood loss anemia [D62]     DNR (do not resuscitate) [Z66]     Dementia (HCC) [F03.90]     Hypothyroid [E03.9]            Presenting Chief Complaint:  Hematochezia      Interval History:  Patient is a 72-year-old female with history of dementia and diverticulosis who presents with acute onset of hematochezia.  Vital signs are stable, hemoglobin is 11.2 which appears to be her baseline.  CTA unable to determine site of bleeding, consistent with sigmoid diverticulosis.     Patient seen and examined playing cards with her .  No further episodes of hematochezia.  Hemoglobin stable at 9.8.    Hospital Medications:  Current Facility-Administered Medications   Medication Dose Frequency Provider Last Rate Last Admin    levothyroxine (SYNTHROID) tablet 88 mcg  88 mcg AM ES Aguila Calero M.D.   88 mcg at 06/05/23 0504    melatonin tablet 10 mg  10 mg Nightly Aguila Calero M.D.   10 mg at 06/04/23 2019    senna-docusate (PERICOLACE or SENOKOT S) 8.6-50 MG per tablet 2 Tablet  2 Tablet BID Aguila Calero M.D.        And    polyethylene glycol/lytes (MIRALAX) PACKET 1 Packet  1 Packet QDAY PRN Aguila Calero M.D.        And    magnesium hydroxide (MILK OF MAGNESIA) suspension 30 mL  30 mL QDAY PRN Aguila Calero M.D.        And    bisacodyl (DULCOLAX) suppository 10 mg  10 mg QDAY PRN Aguila Calero M.D.        acetaminophen (Tylenol) tablet 650 mg  650 mg Q6HRS PRN Aguila Calero M.D.        ondansetron (ZOFRAN) syringe/vial injection 4 mg  4 mg Q4HRS PRN Aguila Calero M.D.        ondansetron (ZOFRAN ODT) dispertab 4 mg  4 mg Q4HRS  "PRN Aguila Calero M.D.        omeprazole (PRILOSEC) capsule 20 mg  20 mg BID Ml Varner, A.P.R.N.   20 mg at 06/05/23 0504   Last reviewed on 6/4/2023  9:15 AM by Lyssa Mares, T       Review of Systems:  Review of Systems   Constitutional:  Negative for chills, fever and malaise/fatigue.   HENT:  Negative for hearing loss.    Eyes:  Negative for blurred vision.   Respiratory:  Negative for cough and shortness of breath.    Cardiovascular:  Negative for chest pain and leg swelling.   Gastrointestinal:  Negative for abdominal pain, blood in stool, constipation, diarrhea, heartburn, melena, nausea and vomiting.   Genitourinary:  Negative for dysuria.   Musculoskeletal:  Negative for back pain.   Skin:  Negative for rash.   Neurological:  Negative for dizziness and weakness.   Psychiatric/Behavioral:  Negative for depression.    All other systems reviewed and are negative.        Vital signs:  Weight/BMI: Body mass index is 23.05 kg/m².  /87   Pulse 82   Temp 37 °C (98.6 °F) (Temporal)   Resp 16   Ht 1.626 m (5' 4\")   Wt 60.9 kg (134 lb 5 oz)   SpO2 96%   Vitals:    06/04/23 1953 06/05/23 0000 06/05/23 0352 06/05/23 0724   BP: (!) 139/91 132/88 130/85 122/87   Pulse: 74 74 77 82   Resp: 18 18 17 16   Temp:    37 °C (98.6 °F)   TempSrc: Temporal Temporal Temporal Temporal   SpO2: 93% 94% 90% 96%   Weight:       Height:         Oxygen Therapy:  Pulse Oximetry: 96 %, O2 (LPM): 2, O2 Delivery Device: Nasal Cannula    Intake/Output Summary (Last 24 hours) at 6/5/2023 1153  Last data filed at 6/5/2023 0800  Gross per 24 hour   Intake 270 ml   Output 1100 ml   Net -830 ml         Physical Exam:  Physical Exam  Vitals and nursing note reviewed.   Constitutional:       General: She is not in acute distress.     Appearance: Normal appearance.   HENT:      Head: Normocephalic and atraumatic.      Right Ear: External ear normal.      Left Ear: External ear normal.      Nose: Nose normal.      " Mouth/Throat:      Mouth: Mucous membranes are moist.      Pharynx: Oropharynx is clear.   Eyes:      General: No scleral icterus.  Cardiovascular:      Rate and Rhythm: Normal rate and regular rhythm.      Pulses: Normal pulses.      Heart sounds: Normal heart sounds.   Pulmonary:      Effort: Pulmonary effort is normal. No respiratory distress.      Breath sounds: Normal breath sounds.   Abdominal:      General: Abdomen is flat. Bowel sounds are normal. There is no distension.      Palpations: Abdomen is soft.      Tenderness: There is no abdominal tenderness.   Musculoskeletal:         General: Normal range of motion.      Cervical back: Normal range of motion.   Skin:     General: Skin is warm and dry.      Capillary Refill: Capillary refill takes less than 2 seconds.      Coloration: Skin is pale.   Neurological:      Mental Status: She is alert and oriented to person, place, and time.   Psychiatric:         Mood and Affect: Mood normal.         Behavior: Behavior normal.             Labs:  Recent Labs     06/04/23  0606   SODIUM 141   POTASSIUM 4.0   CHLORIDE 109   CO2 18*   BUN 29*   CREATININE 0.70   CALCIUM 8.8     Recent Labs     06/04/23  0606   ALTSGPT 18   ASTSGOT 20   ALKPHOSPHAT 87   TBILIRUBIN <0.2   LIPASE 19   GLUCOSE 126*     Recent Labs     06/04/23  0606   WBC 12.2*   NEUTSPOLYS 69.30   LYMPHOCYTES 20.80*   MONOCYTES 5.50   EOSINOPHILS 3.40   BASOPHILS 0.60   ASTSGOT 20   ALTSGPT 18   ALKPHOSPHAT 87   TBILIRUBIN <0.2     Recent Labs     06/04/23  0606 06/04/23  1710 06/05/23  0113 06/05/23  0904   RBC 4.27  --   --   --    HEMOGLOBIN 11.2* 10.4* 9.9* 9.8*   HEMATOCRIT 36.3*  --   --   --    PLATELETCT 430  --   --   --    PROTHROMBTM 13.1  --   --   --    APTT 22.4*  --   --   --    INR 1.00  --   --   --      Recent Results (from the past 24 hour(s))   HGB    Collection Time: 06/04/23  5:10 PM   Result Value Ref Range    Hemoglobin 10.4 (L) 12.0 - 16.0 g/dL   HGB    Collection Time: 06/05/23   1:13 AM   Result Value Ref Range    Hemoglobin 9.9 (L) 12.0 - 16.0 g/dL   Urinalysis    Collection Time: 06/05/23  6:19 AM    Specimen: Urine   Result Value Ref Range    Color Yellow     Character Clear     Specific Gravity 1.045 <1.035    Ph 6.5 5.0 - 8.0    Glucose Negative Negative mg/dL    Ketones Negative Negative mg/dL    Protein Negative Negative mg/dL    Bilirubin Negative Negative    Urobilinogen, Urine 0.2 Negative    Nitrite Negative Negative    Leukocyte Esterase Moderate (A) Negative    Occult Blood Moderate (A) Negative    Micro Urine Req Microscopic    URINE MICROSCOPIC (W/UA)    Collection Time: 06/05/23  6:19 AM   Result Value Ref Range    WBC 20-50 (A) /hpf    RBC 0-2 /hpf    Bacteria Negative None /hpf    Epithelial Cells Negative /hpf    Hyaline Cast 0-2 /lpf   HGB    Collection Time: 06/05/23  9:04 AM   Result Value Ref Range    Hemoglobin 9.8 (L) 12.0 - 16.0 g/dL       Radiology Review:  CT-CTA ABDOMEN WITH & W/O-POST PROCESS   Final Result      There is motion artifact, limiting evaluation.      1. No evidence of active contrast extravasation seen in the bowel.      2. Sigmoid diverticulosis.      DX-CHEST-PORTABLE (1 VIEW)   Final Result         1.  No acute cardiopulmonary disease.   2.  Atherosclerosis            MDM (Data Review):   -Records reviewed and summarized in current documentation  -I personally reviewed and interpreted the laboratory results  -I personally reviewed the radiology images    Assessment/Recommendations:  Impressions:  Hematochezia  Acute blood loss anemia  Sigmoid diverticulosis  History of GI bleeding  Dementia    Recommendations:  Establish with outpatient surgeon for consideration of future colectomy if diverticular bleeding continues  Discussed with patient's , given that the incidence of GI bleeding were approximately 3 years apart there is no urgent indication for surgery at this time, but it is beneficial for them to establish a relationship in case  surgical intervention is required  Also discussed that patient needs to avoid NSAIDs and aspirin  OK to discharge from GI standpoint    Plan of care discussed with patient and her , Dr. Dan, and Dr. Cortes      Core Quality Measures   Reviewed items::  Labs, Medications and Radiology reports reviewed

## 2023-06-05 NOTE — DISCHARGE SUMMARY
Discharge Summary    CHIEF COMPLAINT ON ADMISSION  Chief Complaint   Patient presents with    Lower GI Bleed    Dizziness       Reason for Admission  Abdominal Pain     Admission Date  6/4/2023    CODE STATUS  DNAR/DNI    HPI & HOSPITAL COURSE  This is a 72 y.o. female who presented 6/4/2023 with bright red blood per rectum.  Ms. Reinoso has a past medical history of dementia and hypothyroidism that has had multiple admissions for diverticular bleeding including 2016, 2018, 2 admissions in 2019, and most recently admission in November 2022 at which time she received 6 units of packed red blood cells and colonoscopy was negative for source of bleeding though the presumptive diagnosis was diverticular bleed at that time. On admission day in the morning  she had bright red blood per rectum and her  brought her in.  CTA is negative for extravasation  She has been taking aspirin at night though was not NSAIDs or blood thinners otherwise.  GI was consulted and patient admitted for further work up and treatment . GI has evaluated patient and after extensive discussion with her  have elected  to to conservatively manage her diverticular bleed. Her hemoglobin has been stable and she had not have any more bleeding episodes.  Per GI patient can be discharged and if this keeps havening will need  a surgical evaluation as outpatient. I have discussed this with patient's  and have placed a referral for colorectal surgery   Will discharge patient home today      The patient met 2-midnight criteria for an inpatient stay at the time of discharge.    Discharge Date  6/5/23    FOLLOW UP ITEMS POST DISCHARGE  GI  Surgery  PCP    DISCHARGE DIAGNOSES  Principal Problem:    Lower GI bleed (POA: Yes)  Active Problems:    Hypothyroid (Chronic) (POA: Yes)    Dementia (HCC) (POA: Yes)    DNR (do not resuscitate) (POA: Yes)      Overview: IMO load March 2020    Acute blood loss anemia (POA: Yes)  Resolved Problems:    * No  resolved hospital problems. *      FOLLOW UP  No future appointments.  No follow-up provider specified.    MEDICATIONS ON DISCHARGE     Medication List        START taking these medications        Instructions   omeprazole 20 MG delayed-release capsule  Commonly known as: PRILOSEC   Take 1 Capsule by mouth 2 times a day.  Dose: 20 mg            CONTINUE taking these medications        Instructions   CO Q 10 PO   Take 1 Capsule by mouth every day.  Dose: 1 Capsule     levothyroxine 88 MCG Tabs  Commonly known as: SYNTHROID   Take 88 mcg by mouth every morning on an empty stomach.  Dose: 88 mcg     Melatonin 10 MG Tabs   Take 10 mg by mouth every evening.  Dose: 10 mg     VITAMIN B COMPLEX PO   Take 1 Tablet by mouth every day.  Dose: 1 Tablet              Allergies  No Known Allergies    DIET  Orders Placed This Encounter   Procedures    Diet Order Diet: Regular     Standing Status:   Standing     Number of Occurrences:   1     Order Specific Question:   Diet:     Answer:   Regular [1]       ACTIVITY  As tolerated.  Weight bearing as tolerated    CONSULTATIONS  GI    PROCEDURES  None     LABORATORY  Lab Results   Component Value Date    SODIUM 141 06/04/2023    POTASSIUM 4.0 06/04/2023    CHLORIDE 109 06/04/2023    CO2 18 (L) 06/04/2023    GLUCOSE 126 (H) 06/04/2023    BUN 29 (H) 06/04/2023    CREATININE 0.70 06/04/2023    CREATININE 0.8 04/24/2009        Lab Results   Component Value Date    WBC 12.2 (H) 06/04/2023    HEMOGLOBIN 9.8 (L) 06/05/2023    HEMATOCRIT 36.3 (L) 06/04/2023    PLATELETCT 430 06/04/2023        Total time of the discharge process exceeds 42 minutes.

## 2024-03-28 ENCOUNTER — HOSPITAL ENCOUNTER (EMERGENCY)
Facility: MEDICAL CENTER | Age: 74
End: 2024-03-28
Attending: EMERGENCY MEDICINE
Payer: COMMERCIAL

## 2024-03-28 VITALS
BODY MASS INDEX: 24.73 KG/M2 | HEIGHT: 64 IN | WEIGHT: 144.84 LBS | OXYGEN SATURATION: 94 % | SYSTOLIC BLOOD PRESSURE: 154 MMHG | DIASTOLIC BLOOD PRESSURE: 70 MMHG | TEMPERATURE: 97.8 F | RESPIRATION RATE: 15 BRPM | HEART RATE: 63 BPM

## 2024-03-28 DIAGNOSIS — N39.0 ACUTE UTI: ICD-10-CM

## 2024-03-28 LAB
ANION GAP SERPL CALC-SCNC: 12 MMOL/L (ref 7–16)
APPEARANCE UR: ABNORMAL
BACTERIA #/AREA URNS HPF: ABNORMAL /HPF
BASOPHILS # BLD AUTO: 0.8 % (ref 0–1.8)
BASOPHILS # BLD: 0.06 K/UL (ref 0–0.12)
BILIRUB UR QL STRIP.AUTO: NEGATIVE
BUN SERPL-MCNC: 15 MG/DL (ref 8–22)
CALCIUM SERPL-MCNC: 8.9 MG/DL (ref 8.5–10.5)
CHLORIDE SERPL-SCNC: 108 MMOL/L (ref 96–112)
CO2 SERPL-SCNC: 22 MMOL/L (ref 20–33)
COLOR UR: ABNORMAL
CREAT SERPL-MCNC: 0.69 MG/DL (ref 0.5–1.4)
EOSINOPHIL # BLD AUTO: 0.19 K/UL (ref 0–0.51)
EOSINOPHIL NFR BLD: 2.5 % (ref 0–6.9)
EPI CELLS #/AREA URNS HPF: ABNORMAL /HPF
ERYTHROCYTE [DISTWIDTH] IN BLOOD BY AUTOMATED COUNT: 54.7 FL (ref 35.9–50)
GFR SERPLBLD CREATININE-BSD FMLA CKD-EPI: 91 ML/MIN/1.73 M 2
GLUCOSE SERPL-MCNC: 88 MG/DL (ref 65–99)
GLUCOSE UR STRIP.AUTO-MCNC: NEGATIVE MG/DL
HCT VFR BLD AUTO: 43 % (ref 37–47)
HGB BLD-MCNC: 14.1 G/DL (ref 12–16)
HYALINE CASTS #/AREA URNS LPF: ABNORMAL /LPF
IMM GRANULOCYTES # BLD AUTO: 0.03 K/UL (ref 0–0.11)
IMM GRANULOCYTES NFR BLD AUTO: 0.4 % (ref 0–0.9)
KETONES UR STRIP.AUTO-MCNC: NEGATIVE MG/DL
LEUKOCYTE ESTERASE UR QL STRIP.AUTO: ABNORMAL
LYMPHOCYTES # BLD AUTO: 1.87 K/UL (ref 1–4.8)
LYMPHOCYTES NFR BLD: 24.9 % (ref 22–41)
MCH RBC QN AUTO: 31.1 PG (ref 27–33)
MCHC RBC AUTO-ENTMCNC: 32.8 G/DL (ref 32.2–35.5)
MCV RBC AUTO: 94.7 FL (ref 81.4–97.8)
MICRO URNS: ABNORMAL
MONOCYTES # BLD AUTO: 0.71 K/UL (ref 0–0.85)
MONOCYTES NFR BLD AUTO: 9.5 % (ref 0–13.4)
NEUTROPHILS # BLD AUTO: 4.65 K/UL (ref 1.82–7.42)
NEUTROPHILS NFR BLD: 61.9 % (ref 44–72)
NITRITE UR QL STRIP.AUTO: NEGATIVE
NRBC # BLD AUTO: 0 K/UL
NRBC BLD-RTO: 0 /100 WBC (ref 0–0.2)
PH UR STRIP.AUTO: 6 [PH] (ref 5–8)
PLATELET # BLD AUTO: 322 K/UL (ref 164–446)
PMV BLD AUTO: 9.9 FL (ref 9–12.9)
POTASSIUM SERPL-SCNC: 4.2 MMOL/L (ref 3.6–5.5)
PROT UR QL STRIP: 30 MG/DL
RBC # BLD AUTO: 4.54 M/UL (ref 4.2–5.4)
RBC # URNS HPF: ABNORMAL /HPF
RBC UR QL AUTO: NEGATIVE
SODIUM SERPL-SCNC: 142 MMOL/L (ref 135–145)
SP GR UR STRIP.AUTO: 1.02
UROBILINOGEN UR STRIP.AUTO-MCNC: 0.2 MG/DL
WBC # BLD AUTO: 7.5 K/UL (ref 4.8–10.8)
WBC #/AREA URNS HPF: ABNORMAL /HPF

## 2024-03-28 PROCEDURE — 700111 HCHG RX REV CODE 636 W/ 250 OVERRIDE (IP): Performed by: EMERGENCY MEDICINE

## 2024-03-28 PROCEDURE — 36415 COLL VENOUS BLD VENIPUNCTURE: CPT

## 2024-03-28 PROCEDURE — 80048 BASIC METABOLIC PNL TOTAL CA: CPT

## 2024-03-28 PROCEDURE — 81001 URINALYSIS AUTO W/SCOPE: CPT

## 2024-03-28 PROCEDURE — 99284 EMERGENCY DEPT VISIT MOD MDM: CPT

## 2024-03-28 PROCEDURE — 87086 URINE CULTURE/COLONY COUNT: CPT

## 2024-03-28 PROCEDURE — 85025 COMPLETE CBC W/AUTO DIFF WBC: CPT

## 2024-03-28 PROCEDURE — 96374 THER/PROPH/DIAG INJ IV PUSH: CPT

## 2024-03-28 RX ORDER — CEPHALEXIN 500 MG/1
500 CAPSULE ORAL 4 TIMES DAILY
Qty: 28 CAPSULE | Refills: 0 | Status: ACTIVE | OUTPATIENT
Start: 2024-03-28 | End: 2024-04-04

## 2024-03-28 RX ORDER — CEFAZOLIN 2 G/1
2 INJECTION, POWDER, FOR SOLUTION INTRAMUSCULAR; INTRAVENOUS ONCE
Status: COMPLETED | OUTPATIENT
Start: 2024-03-28 | End: 2024-03-28

## 2024-03-28 RX ADMIN — CEFAZOLIN 2 G: 2 INJECTION, POWDER, FOR SOLUTION INTRAMUSCULAR; INTRAVENOUS at 05:46

## 2024-03-28 ASSESSMENT — PAIN DESCRIPTION - PAIN TYPE: TYPE: ACUTE PAIN

## 2024-03-28 ASSESSMENT — FIBROSIS 4 INDEX: FIB4 SCORE: 0.8

## 2024-03-28 NOTE — ED PROVIDER NOTES
ED Provider Note    CHIEF COMPLAINT  Chief Complaint   Patient presents with    UTI     Pt reports about a month ago started having a UTI, pt was placed on a Macrobid regimen x 1 week, pt's  reports she started having UTI symptoms again, Pt went to her PCP and had another urine test done which did show a UTI, pt was then placed on Cefdinir. Pt has one pill left and has not gotten any better.     Leg Swelling     Pt having bilateral leg swelling per , pt reports pain in her legs, especially with ambulation. Per  pt has early onset of dementia.        EXTERNAL RECORDS REVIEWED  Inpatient Notes Hospital discharge summary 6/4/2023 reviewed.  Patient brought in for lightheaded dizziness with GI bleed.  It is documented the patient is DNR and DNI    HPI/ROS  LIMITATION TO HISTORY   Select: Altered mental status / Confusion  OUTSIDE HISTORIAN(S):  Family  providing history    Jayshree Reinoso is a 73 y.o. female who presents to the emergency ferment with 's concern about persistent UTI.  Past medical history as document below.  Over the last month patient has been treated with multiple times with multiple antibiotics for persistent UTI as treated in the outpatient setting.  Initially on Macrobid and now today finishing a course of cefdinir.  He states that the patient has had decreased urinary frequency but does continue to have relatively increased urinary frequency from baseline.  Denies any notable abdominal or back pain.  No fever or chills.  No nausea volume.  Slight decreased p.o. intake and states that her energy is somewhat lower.  Difficult history secondary to chronic dementia.        PAST MEDICAL HISTORY   has a past medical history of Alzheimer's disease (HCC), Alzheimer's disease (HCC), Anesthesia, Arthritis, Backpain, Cancer (HCC) (2010), Hypothyroid, Other specified symptom associated with female genital organs, Pain, Renal disorder, Unspecified disorder of thyroid, and  "Unspecified urinary incontinence.    SURGICAL HISTORY   has a past surgical history that includes scleral buckling (7/9/08); rectus repair (10/9/08); hernia repair; inguinal hernia repair (12/18/2008); laparoscopy (5/7/2009); femoral hernia repair (5/7/2009); fusion, spine, lumbar, plif (2/27/2013); lumbar laminectomy diskectomy (2/27/2013); gyn surgery; vaginectomy (7/25/2011); gyn surgery; gyn surgery; knee arthroplasty total (Right, 7/11/2016); gastroscopy-endo (8/11/2016); gastroscopy-endo (N/A, 7/26/2019); colonoscopy - endo (N/A, 7/27/2019); and colonoscopy,diagnostic (N/A, 11/5/2022).    FAMILY HISTORY  History reviewed. No pertinent family history.    SOCIAL HISTORY  Social History     Tobacco Use    Smoking status: Never    Smokeless tobacco: Never   Vaping Use    Vaping Use: Never used   Substance and Sexual Activity    Alcohol use: No    Drug use: No    Sexual activity: Not Currently       CURRENT MEDICATIONS  Home Medications       Reviewed by Jeni Luo R.N. (Registered Nurse) on 03/28/24 at 0447  Med List Status: Not Addressed     Medication Last Dose Status   B Complex Vitamins (VITAMIN B COMPLEX PO)  Active   Coenzyme Q10 (CO Q 10 PO)  Active   levothyroxine (SYNTHROID) 88 MCG Tab  Active   Melatonin 10 MG Tab  Active   omeprazole (PRILOSEC) 20 MG delayed-release capsule  Active                    ALLERGIES  No Known Allergies    PHYSICAL EXAM  VITAL SIGNS: BP (!) 154/70   Pulse 63   Temp 36.6 °C (97.8 °F) (Temporal)   Resp 15   Ht 1.626 m (5' 4\")   Wt 65.7 kg (144 lb 13.5 oz)   SpO2 94%   BMI 24.86 kg/m²          Pulse ox interpretation: I interpret this pulse ox as normal.  Constitutional: Alert in no apparent distress.  HENT: No signs of trauma, Bilateral external ears normal, Nose normal.   Eyes: Pupils are equal and reactive  Neck: Normal range of motion, No tenderness, Supple  Cardiovascular: Regular rate and rhythm, no murmurs.   Thorax & Lungs: Normal breath sounds, No " respiratory distress, No wheezing, No chest tenderness.   Abdomen: Bowel sounds normal, Soft, No tenderness  Skin: Warm, Dry, No erythema, No rash.   Musculoskeletal: Good range of motion in all major joints.  Neurologic: Alert , awake.  Oriented to person only.        DIAGNOSTIC STUDIES / PROCEDURES      LABS  Results for orders placed or performed during the hospital encounter of 03/28/24   Urinalysis, Culture if Indicated    Specimen: Urine, Clean Catch   Result Value Ref Range    Color DK Yellow     Character Cloudy (A)     Specific Gravity 1.023 <1.035    Ph 6.0 5.0 - 8.0    Glucose Negative Negative mg/dL    Ketones Negative Negative mg/dL    Protein 30 (A) Negative mg/dL    Bilirubin Negative Negative    Urobilinogen, Urine 0.2 Negative    Nitrite Negative Negative    Leukocyte Esterase Moderate (A) Negative    Occult Blood Negative Negative    Micro Urine Req Microscopic    CBC WITH DIFFERENTIAL   Result Value Ref Range    WBC 7.5 4.8 - 10.8 K/uL    RBC 4.54 4.20 - 5.40 M/uL    Hemoglobin 14.1 12.0 - 16.0 g/dL    Hematocrit 43.0 37.0 - 47.0 %    MCV 94.7 81.4 - 97.8 fL    MCH 31.1 27.0 - 33.0 pg    MCHC 32.8 32.2 - 35.5 g/dL    RDW 54.7 (H) 35.9 - 50.0 fL    Platelet Count 322 164 - 446 K/uL    MPV 9.9 9.0 - 12.9 fL    Neutrophils-Polys 61.90 44.00 - 72.00 %    Lymphocytes 24.90 22.00 - 41.00 %    Monocytes 9.50 0.00 - 13.40 %    Eosinophils 2.50 0.00 - 6.90 %    Basophils 0.80 0.00 - 1.80 %    Immature Granulocytes 0.40 0.00 - 0.90 %    Nucleated RBC 0.00 0.00 - 0.20 /100 WBC    Neutrophils (Absolute) 4.65 1.82 - 7.42 K/uL    Lymphs (Absolute) 1.87 1.00 - 4.80 K/uL    Monos (Absolute) 0.71 0.00 - 0.85 K/uL    Eos (Absolute) 0.19 0.00 - 0.51 K/uL    Baso (Absolute) 0.06 0.00 - 0.12 K/uL    Immature Granulocytes (abs) 0.03 0.00 - 0.11 K/uL    NRBC (Absolute) 0.00 K/uL   Basic Metabolic Panel   Result Value Ref Range    Sodium 142 135 - 145 mmol/L    Potassium 4.2 3.6 - 5.5 mmol/L    Chloride 108 96 - 112  mmol/L    Co2 22 20 - 33 mmol/L    Glucose 88 65 - 99 mg/dL    Bun 15 8 - 22 mg/dL    Creatinine 0.69 0.50 - 1.40 mg/dL    Calcium 8.9 8.5 - 10.5 mg/dL    Anion Gap 12.0 7.0 - 16.0   URINE MICROSCOPIC (W/UA)   Result Value Ref Range    WBC 5-10 (A) /hpf    RBC 0-2 /hpf    Bacteria Rare (A) None /hpf    Epithelial Cells Few /hpf    Hyaline Cast 0-2 /lpf   ESTIMATED GFR   Result Value Ref Range    GFR (CKD-EPI) 91 >60 mL/min/1.73 m 2         COURSE & MEDICAL DECISION MAKING    ED Observation Status? No; Patient does not meet criteria for ED Observation.     INITIAL ASSESSMENT, COURSE AND PLAN  Care Narrative: 73-year-old female presenting to the emergency department with above presentation.  Will get new urinalysis and also complete hematologic evaluation to evaluate for worsening infectious processes    DISPOSITION AND DISCUSSIONS  I have discussed management of the patient with the following physicians and CONRADO's: None    Discussion of management with other QHP or appropriate source(s): Pharmacy for medication selection and verification      Escalation of care considered, and ultimately not performed:diagnostic imaging and acute inpatient care management, however at this time, the patient is most appropriate for outpatient management    Barriers to care at this time, including but not limited to:  None .     Decision tools and prescription drugs considered including, but not limited to: Antibiotics will start patient on Keflex as she is recently finished course of Macrobid and cefdinir .    73-year-old female presenting to the emergency department with above presentation.  Hematologic evaluation is reassuring.  Overall the patient appears well.  There is persistent evidence of UTI.  No prior recent cultures to base further antibiotics on.  Will at this point convert to Keflex and have patient follow-up with outpatient PCP.   understanding return precautions here to the ER if needed    FINAL DIAGNOSIS  1. Acute  UTI           Electronically signed by: Josr Angel M.D., 3/28/2024 5:27 AM

## 2024-03-28 NOTE — ED NOTES
Pt discharged to home. Discharge paperwork provided. Education provided by ERP. Reinforced discharge instructions.  Pt was given follow up instructions and prescriptions.  Pt and family member verbalized understanding of all instructions for discharge.   Patient went out of the ER ambulatory via WC and assisted to pt's personal vehicle, alert and oriented x 4, with all belongings.      to drive pt home.

## 2024-03-28 NOTE — ED TRIAGE NOTES
"Chief Complaint   Patient presents with    UTI     Pt reports about a month ago started having a UTI, pt was placed on a Macrobid regimen x 1 week, pt's  reports she started having UTI symptoms again, Pt went to her PCP and had another urine test done which did show a UTI, pt was then placed on Cefdinir. Pt has one pill left and has not gotten any better.     Leg Swelling     Pt having bilateral leg swelling per , pt reports pain in her legs, especially with ambulation. Per  pt has early onset of dementia.        73 y.o. female ambulatory to triage for above complaint with . Pt's  reports pt having urgency and frequency with voiding. Pt denies abdominal pain or back pain. GCS 14.     Pt is alert and oriented, speaking in full sentences, follows commands and responds appropriately to questions. NAD. Resp are even and unlabored.      Pt placed in lobby. Pt educated on triage process. Pt encouraged to alert staff for any changes.     Patient and staff wearing appropriate PPE    BP (!) 137/93   Pulse 67   Temp 36.4 °C (97.6 °F) (Temporal)   Resp 15   Ht 1.626 m (5' 4\")   Wt 65.7 kg (144 lb 13.5 oz)   SpO2 93%   BMI 24.86 kg/m²    "

## 2024-03-30 LAB
BACTERIA UR CULT: NORMAL
SIGNIFICANT IND 70042: NORMAL
SITE SITE: NORMAL
SOURCE SOURCE: NORMAL

## 2024-05-18 ENCOUNTER — HOSPITAL ENCOUNTER (OUTPATIENT)
Facility: MEDICAL CENTER | Age: 74
End: 2024-05-18
Attending: FAMILY MEDICINE
Payer: COMMERCIAL

## 2024-05-18 ENCOUNTER — OFFICE VISIT (OUTPATIENT)
Dept: URGENT CARE | Facility: PHYSICIAN GROUP | Age: 74
End: 2024-05-18
Payer: COMMERCIAL

## 2024-05-18 VITALS
OXYGEN SATURATION: 93 % | WEIGHT: 145.5 LBS | DIASTOLIC BLOOD PRESSURE: 70 MMHG | TEMPERATURE: 97.9 F | HEART RATE: 71 BPM | BODY MASS INDEX: 25.78 KG/M2 | SYSTOLIC BLOOD PRESSURE: 110 MMHG | RESPIRATION RATE: 12 BRPM | HEIGHT: 63 IN

## 2024-05-18 DIAGNOSIS — N39.0 ACUTE UTI: ICD-10-CM

## 2024-05-18 DIAGNOSIS — R35.0 URINARY FREQUENCY: ICD-10-CM

## 2024-05-18 LAB
APPEARANCE UR: NORMAL
BILIRUB UR STRIP-MCNC: NEGATIVE MG/DL
COLOR UR AUTO: YELLOW
FORWARD REASON: SPWHY: NORMAL
FORWARDED TO LAB: SPWHR: NORMAL
GLUCOSE UR STRIP.AUTO-MCNC: NEGATIVE MG/DL
KETONES UR STRIP.AUTO-MCNC: NEGATIVE MG/DL
LEUKOCYTE ESTERASE UR QL STRIP.AUTO: NORMAL
NITRITE UR QL STRIP.AUTO: NEGATIVE
PH UR STRIP.AUTO: 5.5 [PH] (ref 5–8)
PROT UR QL STRIP: 30 MG/DL
RBC UR QL AUTO: NORMAL
SP GR UR STRIP.AUTO: 1.02
SPECIMEN SENT: SPWT1: NORMAL
UROBILINOGEN UR STRIP-MCNC: NORMAL MG/DL

## 2024-05-18 PROCEDURE — 3078F DIAST BP <80 MM HG: CPT | Performed by: FAMILY MEDICINE

## 2024-05-18 PROCEDURE — 3074F SYST BP LT 130 MM HG: CPT | Performed by: FAMILY MEDICINE

## 2024-05-18 PROCEDURE — 99203 OFFICE O/P NEW LOW 30 MIN: CPT | Performed by: FAMILY MEDICINE

## 2024-05-18 PROCEDURE — 81002 URINALYSIS NONAUTO W/O SCOPE: CPT | Performed by: FAMILY MEDICINE

## 2024-05-18 RX ORDER — CEPHALEXIN 500 MG/1
500 CAPSULE ORAL 4 TIMES DAILY
Qty: 28 CAPSULE | Refills: 0 | Status: SHIPPED | OUTPATIENT
Start: 2024-05-18 | End: 2024-05-25

## 2024-05-18 ASSESSMENT — ENCOUNTER SYMPTOMS
EYE DISCHARGE: 0
NAUSEA: 0
VOMITING: 0
EYE REDNESS: 0
MYALGIAS: 0
WEIGHT LOSS: 0

## 2024-05-18 ASSESSMENT — FIBROSIS 4 INDEX: FIB4 SCORE: 1.07

## 2024-05-18 NOTE — PROGRESS NOTES
"Subjective     Jayshree Reinoso is a 73 y.o. female who presents with Dysuria (Fatigue in legs, urgency, X 2 weeks.  in room states cephalexin helps with past UTI's. )            2 weeks urinary frequency and urgency.  Fatigue and generalized weakness.  Similar symptoms with previous UTI.  Home UTI test with +nitrite.  No fever.  No flank pain.  No other aggravating or alleviating factors.        Review of Systems   Constitutional:  Negative for malaise/fatigue and weight loss.   Eyes:  Negative for discharge and redness.   Gastrointestinal:  Negative for nausea and vomiting.   Musculoskeletal:  Negative for joint pain and myalgias.   Skin:  Negative for itching and rash.              Objective     /70 (BP Location: Right arm, Patient Position: Sitting, BP Cuff Size: Adult)   Pulse 71   Temp 36.6 °C (97.9 °F) (Temporal)   Resp 12   Ht 1.6 m (5' 3\")   Wt 66 kg (145 lb 8.1 oz)   SpO2 93%   BMI 25.77 kg/m²      Physical Exam  Constitutional:       General: She is not in acute distress.     Appearance: She is well-developed.   HENT:      Head: Normocephalic and atraumatic.   Eyes:      Conjunctiva/sclera: Conjunctivae normal.   Cardiovascular:      Rate and Rhythm: Normal rate and regular rhythm.      Heart sounds: Normal heart sounds. No murmur heard.  Pulmonary:      Effort: Pulmonary effort is normal.      Breath sounds: Normal breath sounds. No wheezing.   Abdominal:      Palpations: Abdomen is soft.      Tenderness: There is no abdominal tenderness. There is no right CVA tenderness.   Skin:     General: Skin is warm and dry.      Findings: No rash.   Neurological:      Mental Status: She is alert.                             Assessment & Plan   UA reviewed    1. Urinary frequency  POCT Urinalysis      2. Acute UTI  cephALEXin (KEFLEX) 500 MG Cap    URINE CULTURE(NEW)        Differential diagnosis, natural history, supportive care, and indications for immediate follow-up were discussed.     Push " fluids.  Follow-up culture.

## 2024-05-23 ENCOUNTER — TELEPHONE (OUTPATIENT)
Dept: URGENT CARE | Facility: PHYSICIAN GROUP | Age: 74
End: 2024-05-23
Payer: COMMERCIAL

## 2024-05-24 LAB
BACTERIA UR CULT: ABNORMAL
BACTERIA UR CULT: ABNORMAL
OTHER ANTIBIOTIC SUSC ISLT: ABNORMAL

## 2024-06-09 ENCOUNTER — HOSPITAL ENCOUNTER (OUTPATIENT)
Facility: MEDICAL CENTER | Age: 74
End: 2024-06-09
Payer: COMMERCIAL

## 2024-06-09 ENCOUNTER — OFFICE VISIT (OUTPATIENT)
Dept: URGENT CARE | Facility: PHYSICIAN GROUP | Age: 74
End: 2024-06-09
Payer: COMMERCIAL

## 2024-06-09 VITALS
BODY MASS INDEX: 23.92 KG/M2 | RESPIRATION RATE: 18 BRPM | DIASTOLIC BLOOD PRESSURE: 76 MMHG | WEIGHT: 135 LBS | HEIGHT: 63 IN | OXYGEN SATURATION: 94 % | TEMPERATURE: 97.9 F | SYSTOLIC BLOOD PRESSURE: 122 MMHG | HEART RATE: 73 BPM

## 2024-06-09 DIAGNOSIS — N30.00 ACUTE CYSTITIS WITHOUT HEMATURIA: ICD-10-CM

## 2024-06-09 LAB
AMBIGUOUS DTTM AMBI4: NORMAL
APPEARANCE UR: NORMAL
BILIRUB UR STRIP-MCNC: NEGATIVE MG/DL
COLOR UR AUTO: NORMAL
GLUCOSE UR STRIP.AUTO-MCNC: NEGATIVE MG/DL
KETONES UR STRIP.AUTO-MCNC: NORMAL MG/DL
LEUKOCYTE ESTERASE UR QL STRIP.AUTO: NORMAL
NITRITE UR QL STRIP.AUTO: POSITIVE
PH UR STRIP.AUTO: 5.5 [PH] (ref 5–8)
PROT UR QL STRIP: NORMAL MG/DL
RBC UR QL AUTO: NORMAL
SP GR UR STRIP.AUTO: >=1.03
UROBILINOGEN UR STRIP-MCNC: NORMAL MG/DL

## 2024-06-09 PROCEDURE — 81002 URINALYSIS NONAUTO W/O SCOPE: CPT

## 2024-06-09 PROCEDURE — 99213 OFFICE O/P EST LOW 20 MIN: CPT

## 2024-06-09 PROCEDURE — 87186 SC STD MICRODIL/AGAR DIL: CPT

## 2024-06-09 PROCEDURE — 87077 CULTURE AEROBIC IDENTIFY: CPT

## 2024-06-09 PROCEDURE — 87086 URINE CULTURE/COLONY COUNT: CPT

## 2024-06-09 PROCEDURE — 3074F SYST BP LT 130 MM HG: CPT

## 2024-06-09 PROCEDURE — 3078F DIAST BP <80 MM HG: CPT

## 2024-06-09 RX ORDER — CEFDINIR 300 MG/1
300 CAPSULE ORAL 2 TIMES DAILY
Qty: 14 CAPSULE | Refills: 0 | Status: SHIPPED | OUTPATIENT
Start: 2024-06-09 | End: 2024-06-16

## 2024-06-09 ASSESSMENT — ENCOUNTER SYMPTOMS
FEVER: 0
NAUSEA: 0
COUGH: 0
FLANK PAIN: 0
VOMITING: 0
HEADACHES: 0
SHORTNESS OF BREATH: 0
SORE THROAT: 0
DIARRHEA: 0
ABDOMINAL PAIN: 0
MYALGIAS: 0
CHILLS: 0

## 2024-06-09 ASSESSMENT — FIBROSIS 4 INDEX: FIB4 SCORE: 1.07

## 2024-06-09 NOTE — PROGRESS NOTES
"Subjective:   Jayshree Reinoso is a 73 y.o. female who presents for UTI (X may 18th came in and was given medication and isnt going away, went to primary on may 31 and was given a different medication still not helping. )      UTI  This is a recurrent problem. The current episode started yesterday (Since last night). The problem has been gradually worsening. Associated symptoms include urinary symptoms. Pertinent negatives include no abdominal pain, chest pain, chills, congestion, coughing, fever, headaches, myalgias, nausea, rash, sore throat or vomiting. Nothing aggravates the symptoms. She has tried nothing for the symptoms.       Review of Systems   Constitutional:  Negative for chills, fever and malaise/fatigue.   HENT:  Negative for congestion, ear pain, hearing loss and sore throat.    Respiratory:  Negative for cough and shortness of breath.    Cardiovascular:  Negative for chest pain.   Gastrointestinal:  Negative for abdominal pain, diarrhea, nausea and vomiting.   Genitourinary:  Positive for frequency and urgency. Negative for dysuria, flank pain and hematuria.   Musculoskeletal:  Negative for myalgias.   Skin:  Negative for rash.   Neurological:  Negative for headaches.       Medications, Allergies, and current problem list reviewed today in Epic.     Objective:     /76   Pulse 73   Temp 36.6 °C (97.9 °F) (Temporal)   Resp 18   Ht 1.6 m (5' 3\")   Wt 61.2 kg (135 lb)   SpO2 94%     Physical Exam  Vitals and nursing note reviewed.   Constitutional:       Appearance: Normal appearance.   HENT:      Head: Normocephalic and atraumatic.      Right Ear: Tympanic membrane normal.      Left Ear: Tympanic membrane normal.      Nose: Nose normal.      Mouth/Throat:      Mouth: Mucous membranes are moist.   Eyes:      Conjunctiva/sclera: Conjunctivae normal.   Cardiovascular:      Rate and Rhythm: Normal rate.      Heart sounds: Normal heart sounds.   Pulmonary:      Effort: Pulmonary effort is normal. "   Abdominal:      General: Abdomen is flat.      Palpations: Abdomen is soft.      Tenderness: There is no abdominal tenderness. There is no right CVA tenderness or left CVA tenderness.   Musculoskeletal:         General: Normal range of motion.      Cervical back: Normal range of motion.   Skin:     General: Skin is warm and dry.      Capillary Refill: Capillary refill takes less than 2 seconds.   Neurological:      Mental Status: She is alert and oriented to person, place, and time.   Psychiatric:         Mood and Affect: Mood normal.         Behavior: Behavior normal.       Results for orders placed or performed in visit on 06/09/24   POCT Urinalysis   Result Value Ref Range    POC Color Dark yellow Negative    POC Appearance turbid Negative    POC Glucose negative Negative mg/dL    POC Bilirubin negative Negative mg/dL    POC Ketones trace Negative mg/dL    POC Specific Gravity >=1.030 <1.005 - >1.030    POC Blood large Negative    POC Urine PH 5.5 5.0 - 8.0    POC Protein >=300 mg/dl Negative mg/dL    POC Urobiligen 0.2 E.U./dl Negative (0.2) mg/dL    POC Nitrites positive Negative    POC Leukocyte Esterase small Negative         Assessment/Plan:       1. Acute cystitis without hematuria  POCT Urinalysis    URINE CULTURE(NEW)    cefdinir (OMNICEF) 300 MG Cap        After ROS and physical exam patient here with complaint of urinary frequency and urgency since last night.   reports that patient has had recurrent UTIs and is due to see urologist this morning.  Patient was recently seen here and was treated with Keflex.   was told that culture came back positive for E. coli.   does report that patient does have to wear a pad at night for incontinence.   reports that patient typically has pads soaked and unsure if this could be a cause of the UTIs.  Patient denies any fevers or nausea vomiting.  Patient has no abdominal or flank pain.  Patient denies any blood in urine.  After assessment  patient's urine did show positive nitrites and small amount of leukocytes including moderate amount of blood.  Patient had negative CVA/abdominal tenderness.  Urine was sent for culture.  Patient was placed at this time on cefdinir.   was provided some over-the-counter supplements that could help with further UTIs including d-mannose or concentrated cranberry tablets.  Patient has been instructed to monitor for any worsening signs and symptoms and if any other concerns return to the urgent care or emergency department for further management.    Differential diagnosis, natural history, and supportive care discussed. We also reviewed side effects of medication including allergic response, GI upset, tendon injury, rash, sedation etc. Patient and/or guardian voices understanding.      Advised the patient to follow-up with the primary care physician for recheck, reevaluation, and consideration of further management.    I personally reviewed prior external notes and test results pertinent to today's visit as well as additional imaging and testing completed in clinic today.     Please note that this dictation was created using voice recognition software. I have made every reasonable attempt to correct obvious errors, but I expect that there are errors of grammar and possibly content that I did not discover before finalizing the note.    This note was electronically signed by MADONNA Espinoza

## 2024-06-11 LAB
BACTERIA UR CULT: ABNORMAL
BACTERIA UR CULT: ABNORMAL
SIGNIFICANT IND 70042: ABNORMAL
SITE SITE: ABNORMAL
SOURCE SOURCE: ABNORMAL

## 2024-08-16 ENCOUNTER — HOSPITAL ENCOUNTER (OUTPATIENT)
Dept: RADIOLOGY | Facility: MEDICAL CENTER | Age: 74
End: 2024-08-16
Attending: STUDENT IN AN ORGANIZED HEALTH CARE EDUCATION/TRAINING PROGRAM
Payer: COMMERCIAL

## 2024-08-16 DIAGNOSIS — N39.0 UTI (URINARY TRACT INFECTION), UNCOMPLICATED: ICD-10-CM

## 2024-08-16 PROCEDURE — 76775 US EXAM ABDO BACK WALL LIM: CPT

## 2024-12-22 ENCOUNTER — OFFICE VISIT (OUTPATIENT)
Dept: URGENT CARE | Facility: PHYSICIAN GROUP | Age: 74
End: 2024-12-22
Payer: COMMERCIAL

## 2024-12-22 ENCOUNTER — HOSPITAL ENCOUNTER (OUTPATIENT)
Facility: MEDICAL CENTER | Age: 74
End: 2024-12-22
Payer: COMMERCIAL

## 2024-12-22 VITALS
SYSTOLIC BLOOD PRESSURE: 100 MMHG | BODY MASS INDEX: 23.92 KG/M2 | OXYGEN SATURATION: 94 % | WEIGHT: 135 LBS | RESPIRATION RATE: 18 BRPM | DIASTOLIC BLOOD PRESSURE: 70 MMHG | TEMPERATURE: 97.7 F | HEIGHT: 63 IN | HEART RATE: 66 BPM

## 2024-12-22 DIAGNOSIS — R60.0 EDEMA, LOWER EXTREMITY: ICD-10-CM

## 2024-12-22 DIAGNOSIS — L03.115 CELLULITIS OF RIGHT LOWER EXTREMITY: ICD-10-CM

## 2024-12-22 LAB
ANION GAP SERPL CALC-SCNC: 15 MMOL/L (ref 7–16)
BUN SERPL-MCNC: 13 MG/DL (ref 8–22)
CALCIUM SERPL-MCNC: 9.1 MG/DL (ref 8.5–10.5)
CHLORIDE SERPL-SCNC: 106 MMOL/L (ref 96–112)
CO2 SERPL-SCNC: 19 MMOL/L (ref 20–33)
CREAT SERPL-MCNC: 0.51 MG/DL (ref 0.5–1.4)
GFR SERPLBLD CREATININE-BSD FMLA CKD-EPI: 98 ML/MIN/1.73 M 2
GLUCOSE SERPL-MCNC: 90 MG/DL (ref 65–99)
POTASSIUM SERPL-SCNC: 4.4 MMOL/L (ref 3.6–5.5)
SODIUM SERPL-SCNC: 140 MMOL/L (ref 135–145)

## 2024-12-22 PROCEDURE — 3074F SYST BP LT 130 MM HG: CPT

## 2024-12-22 PROCEDURE — 3078F DIAST BP <80 MM HG: CPT

## 2024-12-22 PROCEDURE — 80048 BASIC METABOLIC PNL TOTAL CA: CPT

## 2024-12-22 PROCEDURE — 99213 OFFICE O/P EST LOW 20 MIN: CPT

## 2024-12-22 RX ORDER — CEPHALEXIN 500 MG/1
500 CAPSULE ORAL 4 TIMES DAILY
Qty: 20 CAPSULE | Refills: 0 | Status: SHIPPED | OUTPATIENT
Start: 2024-12-22 | End: 2024-12-27

## 2024-12-22 RX ORDER — FUROSEMIDE 20 MG/1
10 TABLET ORAL DAILY
Qty: 8 TABLET | Refills: 0 | Status: SHIPPED | OUTPATIENT
Start: 2024-12-22 | End: 2025-01-06

## 2024-12-22 ASSESSMENT — ENCOUNTER SYMPTOMS
BLURRED VISION: 0
MYALGIAS: 0
TINGLING: 0
DIZZINESS: 0
WEAKNESS: 0
DOUBLE VISION: 0
NAUSEA: 0
COUGH: 0
HEADACHES: 0
DIARRHEA: 0
CHILLS: 0
SHORTNESS OF BREATH: 0
SORE THROAT: 0
PALPITATIONS: 0
VOMITING: 0
FEVER: 0

## 2024-12-22 ASSESSMENT — FIBROSIS 4 INDEX: FIB4 SCORE: 1.08

## 2024-12-23 NOTE — RESULT ENCOUNTER NOTE
Sent [employer] message regarding the results of the basic metabolic panel, particularly the kidney function and the electrolytes being appropriate for the patient to take her prescribed furosemide.

## 2024-12-23 NOTE — PROGRESS NOTES
Subjective:   Jayshree Reinoso is a 74 y.o. female who presents for Other (Really swollen and dry legs, an bruise on right ankle x 1 week.)    Patient presents with  to the clinic for complaints of increased bilateral ankle and foot edema as well as possible infected wound to the right lower leg.  Patient states the patient has a history of bilateral lower extremity edema that has been increased over the last 1 week.  Has been does not endorse any increased fluid intake, increased walking, or any decrease in urine output.  Has been states that the patient has not taken any diuretics in the past for her lower extremity edema.  Patient also has a small wound on the right lower leg that is scabbed and has redness around it was to be checked out if it is infected.  Has taken medications for current symptoms.  Presents to the patient does not have medical history of heart failure, but does have family history for heart failure.  Patient and  denies chest pain, SOB, dizziness or sweatiness, changes in mentation from her baseline, changes in urination or stooling,Fever, chills, or wheezing.    *Patient has history of severe dementia and is unable to communicate per 's report.   is the main caretaker for patient*    Other  Pertinent negatives include no chest pain, chills, congestion, coughing, fever, headaches, myalgias, nausea, sore throat, vomiting or weakness.       Review of Systems   Constitutional:  Negative for chills and fever.   HENT:  Negative for congestion, ear pain and sore throat.    Eyes:  Negative for blurred vision and double vision.   Respiratory:  Negative for cough and shortness of breath.    Cardiovascular:  Positive for leg swelling (bilateral). Negative for chest pain and palpitations.   Gastrointestinal:  Negative for diarrhea, nausea and vomiting.   Genitourinary:  Negative for dysuria.   Musculoskeletal:  Negative for myalgias.   Skin:         Right lower leg wound.    Neurological:  Negative for dizziness, tingling, weakness and headaches.   All other systems reviewed and are negative.    Refer to \[facility] [place] [facility]\"" for additional details.    During this visit, appropriate PPE was worn, and hand hygiene was performed.    PMH:  has a past medical history of Alzheimer's disease (HCC), Alzheimer's disease (HCC), Anesthesia, Arthritis, Backpain, Cancer (HCC) (2010), Hypothyroid, Other specified symptom associated with female genital organs, Pain, Renal disorder, Unspecified disorder of thyroid, and Unspecified urinary incontinence.    She has no past medical history of Breast cancer ([facility]).    MEDS:   Current Outpatient Medications:     furosemide (LASIX) 20 MG Tab, Take 0.5 Tablets by mouth every day for 15 days., Disp: 8 Tablet, Rfl: 0    cephALEXin (KEFLEX) 500 MG Cap, Take 1 Capsule by mouth 4 times a day for 5 days., Disp: 20 Capsule, Rfl: 0    levothyroxine (SYNTHROID) 88 MCG Tab, Take 88 mcg by mouth every morning on an empty stomach., Disp: , Rfl:     Melatonin 10 MG Tab, Take 10 mg by mouth every evening., Disp: , Rfl:     B Complex Vitamins (VITAMIN B COMPLEX PO), Take 1 Tablet by mouth every day., Disp: , Rfl:     Coenzyme Q10 (CO Q 10 PO), Take 1 Capsule by mouth every day., Disp: , Rfl:     ALLERGIES: No Known Allergies  SURGHX:   Past Surgical History:   Procedure Laterality Date    MS COLONOSCOPY,DIAGNOSTIC N/A 11/5/2022    Procedure: COLONOSCOPY;  Surgeon: Charli Cortes M.D.;  Location: [facility];  Service: Gastroenterology    COLONOSCOPY - ENDO N/A 7/27/2019    Procedure: COLONOSCOPY;  Surgeon: Guru Castañeda M.D.;  Location: ENDOSCOPY [facility];  Service: Gastroenterology    GASTROSCOPY-ENDO N/A 7/26/2019    Procedure: GASTROSCOPY;  Surgeon: Guru Castañeda M.D.;  Location: ENDOSCOPY [facility];  Service: Gastroenterology    GASTROSCOPY-ENDO  8/11/2016    Procedure: GASTROSCOPY-ENDO;  Surgeon: Ruchi Akers M.D.;  Location: [facility]  "[facility];  Service:     KNEE ARTHROPLASTY TOTAL Right 7/11/2016    Procedure: KNEE ARTHROPLASTY TOTAL;  Surgeon: Rayray Valentine M.D.;  Location: SURGERY [facility];  Service:     FUSION, SPINE, LUMBAR, PLIF  2/27/2013    Performed by Sonny Flores M.D. at SURGERY [facility]    LUMBAR LAMINECTOMY DISKECTOMY  2/27/2013    Performed by Sonny Flores M.D. at SURGERY [facility]    VAGINECTOMY  7/25/2011    Performed by MICHELLE BURT at SURGERY SAME DAY [facility] ORS    LAPAROSCOPY  5/7/2009    Performed by RODY SAENZ at SURGERY [facility]    FEMORAL HERNIA REPAIR  5/7/2009    Performed by RODY SAENZ at SURGERY [facility]    INGUINAL HERNIA REPAIR  12/18/2008    Performed by RODY SAENZ at SURGERY SAME DAY [facility] ORS    RECTUS REPAIR  10/9/08    Performed by EFREN REYNOLDS at SURGERY SAME DAY [facility] ORS    SCLERAL BUCKLING  7/9/08    Performed by JORGE DAVENPORT at SURGERY SAME DAY [facility] ORS    GYN SURGERY      GYN SURGERY      vaginal mesh removed    GYN SURGERY      hysterectomy    HERNIA REPAIR       SOCHX:  reports that she has never smoked. She has never used smokeless tobacco. She reports that she does not drink alcohol and does not use drugs.    FH: Per HPI as applicable/pertinent.    Medications, Allergies, and current problem list reviewed today in Epic.     Objective:     /70 (BP Location: Right arm, Patient Position: Sitting, BP Cuff Size: Adult)   Pulse 66   Temp 36.5 °C (97.7 °F) (Temporal)   Resp 18   Ht 1.6 m (5' 3\")   Wt 61.2 kg (135 lb)   SpO2 94%     Physical Exam  Constitutional:       Appearance: Normal appearance. She is not ill-appearing or toxic-appearing.   HENT:      Head: Normocephalic.      Right Ear: Tympanic membrane, ear canal and external ear normal.      Left Ear: Tympanic membrane, ear canal and external ear normal.      Nose: Nose normal. No congestion or rhinorrhea.      Mouth/Throat:      Mouth: Mucous membranes are " moist.      Pharynx: Oropharynx is clear. No oropharyngeal exudate or posterior oropharyngeal erythema.   Eyes:      General:         Right eye: No discharge.         Left eye: No discharge.      Extraocular Movements: Extraocular movements intact.      Conjunctiva/sclera: Conjunctivae normal.      Pupils: Pupils are equal, round, and reactive to light.   Cardiovascular:      Rate and Rhythm: Normal rate and regular rhythm.      Pulses: Normal pulses.      Heart sounds: Normal heart sounds. No murmur heard.     Comments: 2+ bilateral pedal pulses, less than 2 seconds cap refill.  Negative for JVD or extra heart sounds.  Pulmonary:      Effort: Pulmonary effort is normal. No respiratory distress.      Breath sounds: Normal breath sounds. No wheezing or rhonchi.   Abdominal:      General: Abdomen is flat.   Musculoskeletal:         General: Swelling present. No signs of injury. Normal range of motion.      Cervical back: Normal range of motion. No rigidity.      Comments: Bilateral 2+ nonpitting edema to both ankles and feet.   Lymphadenopathy:      Cervical: No cervical adenopathy.   Skin:     General: Skin is warm and dry.             Comments: Small wound with scab on the right lower leg lateral aspect approximately 3 inches superior to the right malleolus.  Wound is surrounded by erythematous tissue, negative for fluctuance or fluid collection palpable on exam.  Wound and surrounding tissue tender to touch.   Neurological:      Mental Status: She is alert. Mental status is at baseline.      Motor: No weakness.         Assessment/Plan:     Diagnosis and associated orders:     1. Edema, lower extremity  - Referral back to PCP  - furosemide (LASIX) 20 MG Tab; Take 0.5 Tablets by mouth every day for 15 days.  Dispense: 8 Tablet; Refill: 0  - Basic Metabolic Panel; Future    2. Cellulitis of right lower extremity  - cephALEXin (KEFLEX) 500 MG Cap; Take 1 Capsule by mouth 4 times a day for 5 days.  Dispense: 20 Capsule;  Refill: 0     Comments/MDM:     Discussed with patient and  that the acute increase in lower extremity edema will require further workup, with my differential diagnosis including multiple etiologies for this edema, including but not limited to heart failure, acute kidney disease, lymphedema, and insufficiency.  Low likelihood of DVT given the bilateral nature of the edema as well as lack of warmth and tenderness with the larger edema.  Advised patient  to follow-up since possible lipoma care provider.  Low-dose daily furosemide prescribed the patient and start basal metabolic panel drawn in clinic prior to patient's discharge from urgent care.  Keflex 5-day course prescribed to the cellulitis associate with the patient's right lower leg wound.  Discussed with  the proper dosing and administration as well as any adverse or side effects associated with medications.  Advised  that the patient will have increased urinary output when taking furosemide and instructed him to decrease the patient's oral fluid intake in order to decrease the edema and pancreatitis.  Instructed  to elevate the patient's legs is much as possible above heart level and to wear compression stockings.  Patient and  agreeable to this plan of care.  All questions were.  RTC symptoms persist/worsen.  Red flag symptoms warranting emergency medical services discussed, including but not limited to chest pain, SOB, decreased urination or stooling, he is in communication for baseline, increased lower extremity edema, JVD, dizziness/lightheadedness, and wet cough or sputum production.         Differential diagnosis, natural history, supportive care, and indications for immediate follow-up discussed.    Advised the patient to follow-up with the primary care physician for recheck, reevaluation, and consideration of further management.    Instructed patient to seek emergency medical attention via calling EMS or going  to the Emergency Room for red flag symptoms, including but not limited to: chest pain, palpitations, fever greater than 103F, shortness of breath, wheezing, new or worsened numbness/tingling, focal or unilateral weakness, and bloody vomit/stool.     Please note that this dictation was created using voice recognition software. I have made a reasonable attempt to correct obvious errors, but I expect that there are errors of grammar and possibly content that I did not discover before finalizing the note.    This note was electronically signed by MADONNA Walls

## 2025-01-01 ENCOUNTER — HOSPITAL ENCOUNTER (INPATIENT)
Facility: MEDICAL CENTER | Age: 75
LOS: 2 days | DRG: 689 | End: 2025-05-13
Attending: EMERGENCY MEDICINE | Admitting: STUDENT IN AN ORGANIZED HEALTH CARE EDUCATION/TRAINING PROGRAM
Payer: MEDICARE

## 2025-01-01 ENCOUNTER — HOME CARE VISIT (OUTPATIENT)
Dept: HOSPICE | Facility: HOSPICE | Age: 75
End: 2025-01-01
Payer: MEDICARE

## 2025-01-01 ENCOUNTER — PHARMACY VISIT (OUTPATIENT)
Dept: PHARMACY | Facility: MEDICAL CENTER | Age: 75
End: 2025-01-01
Payer: COMMERCIAL

## 2025-01-01 ENCOUNTER — HOME CARE VISIT (OUTPATIENT)
Dept: HOSPICE | Facility: HOSPICE | Age: 75
End: 2025-01-01

## 2025-01-01 ENCOUNTER — APPOINTMENT (OUTPATIENT)
Dept: RADIOLOGY | Facility: MEDICAL CENTER | Age: 75
DRG: 689 | End: 2025-01-01
Attending: EMERGENCY MEDICINE
Payer: MEDICARE

## 2025-01-01 ENCOUNTER — HOSPITAL ENCOUNTER (INPATIENT)
Facility: MEDICAL CENTER | Age: 75
LOS: 3 days | DRG: 689 | End: 2025-05-19
Attending: EMERGENCY MEDICINE | Admitting: HOSPITALIST
Payer: MEDICARE

## 2025-01-01 ENCOUNTER — OFFICE VISIT (OUTPATIENT)
Dept: URGENT CARE | Facility: PHYSICIAN GROUP | Age: 75
End: 2025-01-01
Payer: MEDICARE

## 2025-01-01 ENCOUNTER — HOSPICE ADMISSION (OUTPATIENT)
Dept: HOSPICE | Facility: HOSPICE | Age: 75
End: 2025-01-01
Payer: MEDICARE

## 2025-01-01 ENCOUNTER — PATIENT OUTREACH (OUTPATIENT)
Dept: SCHEDULING | Facility: IMAGING CENTER | Age: 75
End: 2025-01-01
Payer: MEDICARE

## 2025-01-01 VITALS — SYSTOLIC BLOOD PRESSURE: 120 MMHG | DIASTOLIC BLOOD PRESSURE: 62 MMHG | RESPIRATION RATE: 16 BRPM | HEART RATE: 100 BPM

## 2025-01-01 VITALS
BODY MASS INDEX: 19.31 KG/M2 | HEIGHT: 62 IN | OXYGEN SATURATION: 92 % | WEIGHT: 104.94 LBS | DIASTOLIC BLOOD PRESSURE: 71 MMHG | RESPIRATION RATE: 18 BRPM | SYSTOLIC BLOOD PRESSURE: 129 MMHG | TEMPERATURE: 97.6 F | HEART RATE: 85 BPM

## 2025-01-01 VITALS
SYSTOLIC BLOOD PRESSURE: 172 MMHG | RESPIRATION RATE: 18 BRPM | BODY MASS INDEX: 18.97 KG/M2 | HEART RATE: 82 BPM | HEIGHT: 64 IN | WEIGHT: 111.11 LBS | TEMPERATURE: 97.5 F | DIASTOLIC BLOOD PRESSURE: 102 MMHG | OXYGEN SATURATION: 94 %

## 2025-01-01 VITALS
WEIGHT: 105.8 LBS | OXYGEN SATURATION: 95 % | HEART RATE: 95 BPM | BODY MASS INDEX: 19.47 KG/M2 | SYSTOLIC BLOOD PRESSURE: 98 MMHG | TEMPERATURE: 97.6 F | HEIGHT: 62 IN | DIASTOLIC BLOOD PRESSURE: 60 MMHG | RESPIRATION RATE: 16 BRPM

## 2025-01-01 VITALS — RESPIRATION RATE: 20 BRPM | HEART RATE: 92 BPM

## 2025-01-01 VITALS — HEART RATE: 104 BPM | RESPIRATION RATE: 16 BRPM

## 2025-01-01 VITALS — HEART RATE: 100 BPM | RESPIRATION RATE: 20 BRPM

## 2025-01-01 VITALS — RESPIRATION RATE: 20 BRPM | HEART RATE: 116 BPM

## 2025-01-01 DIAGNOSIS — N30.01 ACUTE CYSTITIS WITH HEMATURIA: ICD-10-CM

## 2025-01-01 DIAGNOSIS — R62.7 FAILURE TO THRIVE IN ADULT: ICD-10-CM

## 2025-01-01 DIAGNOSIS — E86.0 DEHYDRATION: Primary | ICD-10-CM

## 2025-01-01 DIAGNOSIS — G31.1 SENILE DEGENERATION OF BRAIN (HCC): ICD-10-CM

## 2025-01-01 DIAGNOSIS — Z51.5 HOSPICE CARE: ICD-10-CM

## 2025-01-01 DIAGNOSIS — N39.0 ACUTE UTI: ICD-10-CM

## 2025-01-01 DIAGNOSIS — R10.9 RIGHT FLANK PAIN: Primary | ICD-10-CM

## 2025-01-01 DIAGNOSIS — N39.0 RECURRENT UTI: ICD-10-CM

## 2025-01-01 DIAGNOSIS — G31.1 SENILE DEGENERATION OF BRAIN (HCC): Primary | ICD-10-CM

## 2025-01-01 DIAGNOSIS — F03.90 DEMENTIA ARISING IN THE SENIUM AND PRESENIUM (HCC): ICD-10-CM

## 2025-01-01 DIAGNOSIS — A41.9 SEPSIS WITHOUT ACUTE ORGAN DYSFUNCTION, DUE TO UNSPECIFIED ORGANISM (HCC): ICD-10-CM

## 2025-01-01 DIAGNOSIS — N12 PYELONEPHRITIS: ICD-10-CM

## 2025-01-01 DIAGNOSIS — Z51.5 COMFORT MEASURES ONLY STATUS: ICD-10-CM

## 2025-01-01 DIAGNOSIS — G93.41 ACUTE METABOLIC ENCEPHALOPATHY: ICD-10-CM

## 2025-01-01 LAB
ALBUMIN SERPL BCP-MCNC: 3.2 G/DL (ref 3.2–4.9)
ALBUMIN SERPL BCP-MCNC: 3.6 G/DL (ref 3.2–4.9)
ALBUMIN SERPL BCP-MCNC: 3.8 G/DL (ref 3.2–4.9)
ALBUMIN/GLOB SERPL: 0.8 G/DL
ALBUMIN/GLOB SERPL: 0.9 G/DL
ALBUMIN/GLOB SERPL: 0.9 G/DL
ALP SERPL-CCNC: 73 U/L (ref 30–99)
ALP SERPL-CCNC: 73 U/L (ref 30–99)
ALP SERPL-CCNC: 74 U/L (ref 30–99)
ALT SERPL-CCNC: 10 U/L (ref 2–50)
ALT SERPL-CCNC: 6 U/L (ref 2–50)
ALT SERPL-CCNC: <5 U/L (ref 2–50)
ANION GAP SERPL CALC-SCNC: 12 MMOL/L (ref 7–16)
ANION GAP SERPL CALC-SCNC: 13 MMOL/L (ref 7–16)
ANION GAP SERPL CALC-SCNC: 13 MMOL/L (ref 7–16)
ANION GAP SERPL CALC-SCNC: 14 MMOL/L (ref 7–16)
ANION GAP SERPL CALC-SCNC: 15 MMOL/L (ref 7–16)
APPEARANCE UR: ABNORMAL
APPEARANCE UR: ABNORMAL
AST SERPL-CCNC: 18 U/L (ref 12–45)
AST SERPL-CCNC: 22 U/L (ref 12–45)
AST SERPL-CCNC: 23 U/L (ref 12–45)
BACTERIA #/AREA URNS HPF: ABNORMAL /HPF
BACTERIA #/AREA URNS HPF: ABNORMAL /HPF
BACTERIA BLD CULT: NORMAL
BACTERIA BLD CULT: NORMAL
BACTERIA UR CULT: ABNORMAL
BACTERIA UR CULT: ABNORMAL
BACTERIA UR CULT: NORMAL
BACTERIA UR CULT: NORMAL
BASOPHILS # BLD AUTO: 0.3 % (ref 0–1.8)
BASOPHILS # BLD AUTO: 0.3 % (ref 0–1.8)
BASOPHILS # BLD AUTO: 0.5 % (ref 0–1.8)
BASOPHILS # BLD: 0.04 K/UL (ref 0–0.12)
BASOPHILS # BLD: 0.04 K/UL (ref 0–0.12)
BASOPHILS # BLD: 0.07 K/UL (ref 0–0.12)
BILIRUB SERPL-MCNC: 0.3 MG/DL (ref 0.1–1.5)
BILIRUB SERPL-MCNC: 0.4 MG/DL (ref 0.1–1.5)
BILIRUB SERPL-MCNC: 0.5 MG/DL (ref 0.1–1.5)
BILIRUB UR QL STRIP.AUTO: NEGATIVE
BILIRUB UR QL STRIP.AUTO: NEGATIVE
BUN SERPL-MCNC: 13 MG/DL (ref 8–22)
BUN SERPL-MCNC: 14 MG/DL (ref 8–22)
BUN SERPL-MCNC: 16 MG/DL (ref 8–22)
BUN SERPL-MCNC: 21 MG/DL (ref 8–22)
BUN SERPL-MCNC: 8 MG/DL (ref 8–22)
CALCIUM ALBUM COR SERPL-MCNC: 9 MG/DL (ref 8.5–10.5)
CALCIUM ALBUM COR SERPL-MCNC: 9.5 MG/DL (ref 8.5–10.5)
CALCIUM ALBUM COR SERPL-MCNC: 9.6 MG/DL (ref 8.5–10.5)
CALCIUM SERPL-MCNC: 8.4 MG/DL (ref 8.5–10.5)
CALCIUM SERPL-MCNC: 8.5 MG/DL (ref 8.5–10.5)
CALCIUM SERPL-MCNC: 8.6 MG/DL (ref 8.5–10.5)
CALCIUM SERPL-MCNC: 9.3 MG/DL (ref 8.5–10.5)
CALCIUM SERPL-MCNC: 9.3 MG/DL (ref 8.5–10.5)
CASTS URNS QL MICRO: ABNORMAL /LPF (ref 0–2)
CASTS URNS QL MICRO: ABNORMAL /LPF (ref 0–2)
CHLORIDE SERPL-SCNC: 105 MMOL/L (ref 96–112)
CHLORIDE SERPL-SCNC: 105 MMOL/L (ref 96–112)
CHLORIDE SERPL-SCNC: 106 MMOL/L (ref 96–112)
CHLORIDE SERPL-SCNC: 106 MMOL/L (ref 96–112)
CHLORIDE SERPL-SCNC: 108 MMOL/L (ref 96–112)
CO2 SERPL-SCNC: 19 MMOL/L (ref 20–33)
CO2 SERPL-SCNC: 20 MMOL/L (ref 20–33)
CO2 SERPL-SCNC: 20 MMOL/L (ref 20–33)
COLOR UR: ABNORMAL
COLOR UR: YELLOW
CREAT SERPL-MCNC: 0.44 MG/DL (ref 0.5–1.4)
CREAT SERPL-MCNC: 0.5 MG/DL (ref 0.5–1.4)
CREAT SERPL-MCNC: 0.64 MG/DL (ref 0.5–1.4)
CREAT SERPL-MCNC: 0.77 MG/DL (ref 0.5–1.4)
CREAT SERPL-MCNC: 0.89 MG/DL (ref 0.5–1.4)
EOSINOPHIL # BLD AUTO: 0 K/UL (ref 0–0.51)
EOSINOPHIL # BLD AUTO: 0.13 K/UL (ref 0–0.51)
EOSINOPHIL # BLD AUTO: 0.29 K/UL (ref 0–0.51)
EOSINOPHIL NFR BLD: 0 % (ref 0–6.9)
EOSINOPHIL NFR BLD: 1.1 % (ref 0–6.9)
EOSINOPHIL NFR BLD: 1.9 % (ref 0–6.9)
EPITHELIAL CELLS 1715: ABNORMAL /HPF (ref 0–5)
EPITHELIAL CELLS 1715: ABNORMAL /HPF (ref 0–5)
ERYTHROCYTE [DISTWIDTH] IN BLOOD BY AUTOMATED COUNT: 49.1 FL (ref 35.9–50)
ERYTHROCYTE [DISTWIDTH] IN BLOOD BY AUTOMATED COUNT: 49.8 FL (ref 35.9–50)
ERYTHROCYTE [DISTWIDTH] IN BLOOD BY AUTOMATED COUNT: 49.9 FL (ref 35.9–50)
ERYTHROCYTE [DISTWIDTH] IN BLOOD BY AUTOMATED COUNT: 50.4 FL (ref 35.9–50)
ERYTHROCYTE [DISTWIDTH] IN BLOOD BY AUTOMATED COUNT: 50.4 FL (ref 35.9–50)
GAMMA INTERFERON BACKGROUND BLD IA-ACNC: 0.02 IU/ML
GFR SERPLBLD CREATININE-BSD FMLA CKD-EPI: 101 ML/MIN/1.73 M 2
GFR SERPLBLD CREATININE-BSD FMLA CKD-EPI: 68 ML/MIN/1.73 M 2
GFR SERPLBLD CREATININE-BSD FMLA CKD-EPI: 81 ML/MIN/1.73 M 2
GFR SERPLBLD CREATININE-BSD FMLA CKD-EPI: 92 ML/MIN/1.73 M 2
GFR SERPLBLD CREATININE-BSD FMLA CKD-EPI: 98 ML/MIN/1.73 M 2
GLOBULIN SER CALC-MCNC: 3.5 G/DL (ref 1.9–3.5)
GLOBULIN SER CALC-MCNC: 4.3 G/DL (ref 1.9–3.5)
GLOBULIN SER CALC-MCNC: 4.3 G/DL (ref 1.9–3.5)
GLUCOSE SERPL-MCNC: 112 MG/DL (ref 65–99)
GLUCOSE SERPL-MCNC: 65 MG/DL (ref 65–99)
GLUCOSE SERPL-MCNC: 78 MG/DL (ref 65–99)
GLUCOSE SERPL-MCNC: 84 MG/DL (ref 65–99)
GLUCOSE SERPL-MCNC: 94 MG/DL (ref 65–99)
GLUCOSE UR STRIP.AUTO-MCNC: NEGATIVE MG/DL
GLUCOSE UR STRIP.AUTO-MCNC: NEGATIVE MG/DL
HCT VFR BLD AUTO: 40.2 % (ref 37–47)
HCT VFR BLD AUTO: 41.3 % (ref 37–47)
HCT VFR BLD AUTO: 42.2 % (ref 37–47)
HCT VFR BLD AUTO: 43.9 % (ref 37–47)
HCT VFR BLD AUTO: 44.1 % (ref 37–47)
HGB BLD-MCNC: 12.6 G/DL (ref 12–16)
HGB BLD-MCNC: 12.9 G/DL (ref 12–16)
HGB BLD-MCNC: 13.5 G/DL (ref 12–16)
HGB BLD-MCNC: 13.7 G/DL (ref 12–16)
HGB BLD-MCNC: 14.5 G/DL (ref 12–16)
HYALINE CAST   1831: PRESENT /LPF
HYALINE CAST   1831: PRESENT /LPF
IMM GRANULOCYTES # BLD AUTO: 0.04 K/UL (ref 0–0.11)
IMM GRANULOCYTES # BLD AUTO: 0.06 K/UL (ref 0–0.11)
IMM GRANULOCYTES # BLD AUTO: 0.07 K/UL (ref 0–0.11)
IMM GRANULOCYTES NFR BLD AUTO: 0.3 % (ref 0–0.9)
IMM GRANULOCYTES NFR BLD AUTO: 0.4 % (ref 0–0.9)
IMM GRANULOCYTES NFR BLD AUTO: 0.4 % (ref 0–0.9)
KETONES UR STRIP.AUTO-MCNC: >=160 MG/DL
KETONES UR STRIP.AUTO-MCNC: >=160 MG/DL
LACTATE SERPL-SCNC: 1.4 MMOL/L (ref 0.5–2)
LEUKOCYTE ESTERASE UR QL STRIP.AUTO: ABNORMAL
LEUKOCYTE ESTERASE UR QL STRIP.AUTO: ABNORMAL
LIPASE SERPL-CCNC: 15 U/L (ref 11–82)
LYMPHOCYTES # BLD AUTO: 0.94 K/UL (ref 1–4.8)
LYMPHOCYTES # BLD AUTO: 1.1 K/UL (ref 1–4.8)
LYMPHOCYTES # BLD AUTO: 1.2 K/UL (ref 1–4.8)
LYMPHOCYTES NFR BLD: 5.9 % (ref 22–41)
LYMPHOCYTES NFR BLD: 8 % (ref 22–41)
LYMPHOCYTES NFR BLD: 9.6 % (ref 22–41)
M TB IFN-G BLD-IMP: NEGATIVE
M TB IFN-G CD4+ BCKGRND COR BLD-ACNC: 0 IU/ML
MAGNESIUM SERPL-MCNC: 2.1 MG/DL (ref 1.5–2.5)
MCH RBC QN AUTO: 28.6 PG (ref 27–33)
MCH RBC QN AUTO: 28.8 PG (ref 27–33)
MCH RBC QN AUTO: 29.2 PG (ref 27–33)
MCH RBC QN AUTO: 29.2 PG (ref 27–33)
MCH RBC QN AUTO: 30.1 PG (ref 27–33)
MCHC RBC AUTO-ENTMCNC: 31.1 G/DL (ref 32.2–35.5)
MCHC RBC AUTO-ENTMCNC: 31.2 G/DL (ref 32.2–35.5)
MCHC RBC AUTO-ENTMCNC: 31.3 G/DL (ref 32.2–35.5)
MCHC RBC AUTO-ENTMCNC: 32 G/DL (ref 32.2–35.5)
MCHC RBC AUTO-ENTMCNC: 33 G/DL (ref 32.2–35.5)
MCV RBC AUTO: 91.1 FL (ref 81.4–97.8)
MCV RBC AUTO: 91.2 FL (ref 81.4–97.8)
MCV RBC AUTO: 91.3 FL (ref 81.4–97.8)
MCV RBC AUTO: 92.8 FL (ref 81.4–97.8)
MCV RBC AUTO: 93.4 FL (ref 81.4–97.8)
MICRO URNS: ABNORMAL
MICRO URNS: ABNORMAL
MITOGEN IGNF BCKGRD COR BLD-ACNC: >10 IU/ML
MONOCYTES # BLD AUTO: 0.86 K/UL (ref 0–0.85)
MONOCYTES # BLD AUTO: 1 K/UL (ref 0–0.85)
MONOCYTES # BLD AUTO: 1.27 K/UL (ref 0–0.85)
MONOCYTES NFR BLD AUTO: 6.3 % (ref 0–13.4)
MONOCYTES NFR BLD AUTO: 7.5 % (ref 0–13.4)
MONOCYTES NFR BLD AUTO: 8.4 % (ref 0–13.4)
NEUTROPHILS # BLD AUTO: 12.19 K/UL (ref 1.82–7.42)
NEUTROPHILS # BLD AUTO: 13.81 K/UL (ref 1.82–7.42)
NEUTROPHILS # BLD AUTO: 9.3 K/UL (ref 1.82–7.42)
NEUTROPHILS NFR BLD: 80.8 % (ref 44–72)
NEUTROPHILS NFR BLD: 81.2 % (ref 44–72)
NEUTROPHILS NFR BLD: 87.1 % (ref 44–72)
NITRITE UR QL STRIP.AUTO: NEGATIVE
NITRITE UR QL STRIP.AUTO: NEGATIVE
NRBC # BLD AUTO: 0 K/UL
NRBC BLD-RTO: 0 /100 WBC (ref 0–0.2)
PH UR STRIP.AUTO: 5.5 [PH] (ref 5–8)
PH UR STRIP.AUTO: 5.5 [PH] (ref 5–8)
PHOSPHATE SERPL-MCNC: 2.4 MG/DL (ref 2.5–4.5)
PLATELET # BLD AUTO: 282 K/UL (ref 164–446)
PLATELET # BLD AUTO: 311 K/UL (ref 164–446)
PLATELET # BLD AUTO: 312 K/UL (ref 164–446)
PLATELET # BLD AUTO: 364 K/UL (ref 164–446)
PLATELET # BLD AUTO: 371 K/UL (ref 164–446)
PMV BLD AUTO: 10.1 FL (ref 9–12.9)
PMV BLD AUTO: 10.2 FL (ref 9–12.9)
PMV BLD AUTO: 10.3 FL (ref 9–12.9)
PMV BLD AUTO: 10.5 FL (ref 9–12.9)
PMV BLD AUTO: 10.7 FL (ref 9–12.9)
POTASSIUM SERPL-SCNC: 3.9 MMOL/L (ref 3.6–5.5)
POTASSIUM SERPL-SCNC: 4 MMOL/L (ref 3.6–5.5)
POTASSIUM SERPL-SCNC: 4.1 MMOL/L (ref 3.6–5.5)
POTASSIUM SERPL-SCNC: 4.2 MMOL/L (ref 3.6–5.5)
POTASSIUM SERPL-SCNC: 4.8 MMOL/L (ref 3.6–5.5)
PROT SERPL-MCNC: 6.7 G/DL (ref 6–8.2)
PROT SERPL-MCNC: 7.9 G/DL (ref 6–8.2)
PROT SERPL-MCNC: 8.1 G/DL (ref 6–8.2)
PROT UR QL STRIP: 100 MG/DL
PROT UR QL STRIP: >=300 MG/DL
QFT TB2 - NIL TBQ2: 0 IU/ML
RBC # BLD AUTO: 4.41 M/UL (ref 4.2–5.4)
RBC # BLD AUTO: 4.42 M/UL (ref 4.2–5.4)
RBC # BLD AUTO: 4.62 M/UL (ref 4.2–5.4)
RBC # BLD AUTO: 4.75 M/UL (ref 4.2–5.4)
RBC # BLD AUTO: 4.82 M/UL (ref 4.2–5.4)
RBC # URNS HPF: >100 /HPF (ref 0–2)
RBC # URNS HPF: ABNORMAL /HPF (ref 0–2)
RBC UR QL AUTO: ABNORMAL
RBC UR QL AUTO: ABNORMAL
SIGNIFICANT IND 70042: ABNORMAL
SIGNIFICANT IND 70042: NORMAL
SITE SITE: ABNORMAL
SITE SITE: NORMAL
SODIUM SERPL-SCNC: 137 MMOL/L (ref 135–145)
SODIUM SERPL-SCNC: 138 MMOL/L (ref 135–145)
SODIUM SERPL-SCNC: 139 MMOL/L (ref 135–145)
SODIUM SERPL-SCNC: 140 MMOL/L (ref 135–145)
SODIUM SERPL-SCNC: 140 MMOL/L (ref 135–145)
SOURCE SOURCE: ABNORMAL
SOURCE SOURCE: NORMAL
SP GR UR STRIP.AUTO: 1.02
SP GR UR STRIP.AUTO: >=1.03
UROBILINOGEN UR STRIP.AUTO-MCNC: 0.2 EU/DL
UROBILINOGEN UR STRIP.AUTO-MCNC: 1 EU/DL
WBC # BLD AUTO: 10.7 K/UL (ref 4.8–10.8)
WBC # BLD AUTO: 11.5 K/UL (ref 4.8–10.8)
WBC # BLD AUTO: 13.4 K/UL (ref 4.8–10.8)
WBC # BLD AUTO: 15.1 K/UL (ref 4.8–10.8)
WBC # BLD AUTO: 15.9 K/UL (ref 4.8–10.8)
WBC #/AREA URNS HPF: ABNORMAL /HPF
WBC #/AREA URNS HPF: ABNORMAL /HPF

## 2025-01-01 PROCEDURE — 99285 EMERGENCY DEPT VISIT HI MDM: CPT

## 2025-01-01 PROCEDURE — 700105 HCHG RX REV CODE 258: Performed by: HOSPITALIST

## 2025-01-01 PROCEDURE — 700105 HCHG RX REV CODE 258: Performed by: STUDENT IN AN ORGANIZED HEALTH CARE EDUCATION/TRAINING PROGRAM

## 2025-01-01 PROCEDURE — A9270 NON-COVERED ITEM OR SERVICE: HCPCS | Performed by: STUDENT IN AN ORGANIZED HEALTH CARE EDUCATION/TRAINING PROGRAM

## 2025-01-01 PROCEDURE — 80053 COMPREHEN METABOLIC PANEL: CPT

## 2025-01-01 PROCEDURE — 80048 BASIC METABOLIC PNL TOTAL CA: CPT

## 2025-01-01 PROCEDURE — 36415 COLL VENOUS BLD VENIPUNCTURE: CPT

## 2025-01-01 PROCEDURE — 85025 COMPLETE CBC W/AUTO DIFF WBC: CPT

## 2025-01-01 PROCEDURE — 99238 HOSP IP/OBS DSCHRG MGMT 30/<: CPT | Performed by: STUDENT IN AN ORGANIZED HEALTH CARE EDUCATION/TRAINING PROGRAM

## 2025-01-01 PROCEDURE — 99232 SBSQ HOSP IP/OBS MODERATE 35: CPT | Performed by: STUDENT IN AN ORGANIZED HEALTH CARE EDUCATION/TRAINING PROGRAM

## 2025-01-01 PROCEDURE — 99223 1ST HOSP IP/OBS HIGH 75: CPT | Mod: AI | Performed by: STUDENT IN AN ORGANIZED HEALTH CARE EDUCATION/TRAINING PROGRAM

## 2025-01-01 PROCEDURE — 85027 COMPLETE CBC AUTOMATED: CPT

## 2025-01-01 PROCEDURE — 83605 ASSAY OF LACTIC ACID: CPT

## 2025-01-01 PROCEDURE — 700102 HCHG RX REV CODE 250 W/ 637 OVERRIDE(OP): Performed by: HOSPITALIST

## 2025-01-01 PROCEDURE — 87086 URINE CULTURE/COLONY COUNT: CPT

## 2025-01-01 PROCEDURE — G0299 HHS/HOSPICE OF RN EA 15 MIN: HCPCS

## 2025-01-01 PROCEDURE — 84100 ASSAY OF PHOSPHORUS: CPT

## 2025-01-01 PROCEDURE — 99239 HOSP IP/OBS DSCHRG MGMT >30: CPT | Performed by: STUDENT IN AN ORGANIZED HEALTH CARE EDUCATION/TRAINING PROGRAM

## 2025-01-01 PROCEDURE — 96374 THER/PROPH/DIAG INJ IV PUSH: CPT

## 2025-01-01 PROCEDURE — 665036 HSPC NOTICE OF ELECTION NOE

## 2025-01-01 PROCEDURE — 770006 HCHG ROOM/CARE - MED/SURG/GYN SEMI*

## 2025-01-01 PROCEDURE — A9270 NON-COVERED ITEM OR SERVICE: HCPCS | Performed by: HOSPITALIST

## 2025-01-01 PROCEDURE — S9126 HOSPICE CARE, IN THE HOME, P: HCPCS

## 2025-01-01 PROCEDURE — 87077 CULTURE AEROBIC IDENTIFY: CPT

## 2025-01-01 PROCEDURE — 700111 HCHG RX REV CODE 636 W/ 250 OVERRIDE (IP): Performed by: STUDENT IN AN ORGANIZED HEALTH CARE EDUCATION/TRAINING PROGRAM

## 2025-01-01 PROCEDURE — 700102 HCHG RX REV CODE 250 W/ 637 OVERRIDE(OP): Performed by: STUDENT IN AN ORGANIZED HEALTH CARE EDUCATION/TRAINING PROGRAM

## 2025-01-01 PROCEDURE — 83735 ASSAY OF MAGNESIUM: CPT

## 2025-01-01 PROCEDURE — 96375 TX/PRO/DX INJ NEW DRUG ADDON: CPT

## 2025-01-01 PROCEDURE — 83690 ASSAY OF LIPASE: CPT

## 2025-01-01 PROCEDURE — 71045 X-RAY EXAM CHEST 1 VIEW: CPT

## 2025-01-01 PROCEDURE — 665106 HSPC SERVICE INTENSITY ADD-ON

## 2025-01-01 PROCEDURE — G0155 HHCP-SVS OF CSW,EA 15 MIN: HCPCS

## 2025-01-01 PROCEDURE — RXMED WILLOW AMBULATORY MEDICATION CHARGE: Performed by: STUDENT IN AN ORGANIZED HEALTH CARE EDUCATION/TRAINING PROGRAM

## 2025-01-01 PROCEDURE — 700111 HCHG RX REV CODE 636 W/ 250 OVERRIDE (IP): Performed by: EMERGENCY MEDICINE

## 2025-01-01 PROCEDURE — 81001 URINALYSIS AUTO W/SCOPE: CPT

## 2025-01-01 PROCEDURE — 700105 HCHG RX REV CODE 258: Performed by: EMERGENCY MEDICINE

## 2025-01-01 PROCEDURE — 87186 SC STD MICRODIL/AGAR DIL: CPT

## 2025-01-01 PROCEDURE — 86480 TB TEST CELL IMMUN MEASURE: CPT

## 2025-01-01 PROCEDURE — 94760 N-INVAS EAR/PLS OXIMETRY 1: CPT

## 2025-01-01 PROCEDURE — 99223 1ST HOSP IP/OBS HIGH 75: CPT | Mod: AI | Performed by: HOSPITALIST

## 2025-01-01 PROCEDURE — 99213 OFFICE O/P EST LOW 20 MIN: CPT | Performed by: PHYSICIAN ASSISTANT

## 2025-01-01 PROCEDURE — 700111 HCHG RX REV CODE 636 W/ 250 OVERRIDE (IP): Mod: JZ | Performed by: EMERGENCY MEDICINE

## 2025-01-01 PROCEDURE — 770001 HCHG ROOM/CARE - MED/SURG/GYN PRIV*

## 2025-01-01 PROCEDURE — 51701 INSERT BLADDER CATHETER: CPT

## 2025-01-01 PROCEDURE — G0156 HHCP-SVS OF AIDE,EA 15 MIN: HCPCS

## 2025-01-01 PROCEDURE — RXMED WILLOW AMBULATORY MEDICATION CHARGE: Performed by: REHABILITATION PRACTITIONER

## 2025-01-01 PROCEDURE — 97163 PT EVAL HIGH COMPLEX 45 MIN: CPT

## 2025-01-01 PROCEDURE — 700111 HCHG RX REV CODE 636 W/ 250 OVERRIDE (IP): Mod: JZ | Performed by: HOSPITALIST

## 2025-01-01 PROCEDURE — 87040 BLOOD CULTURE FOR BACTERIA: CPT

## 2025-01-01 PROCEDURE — 74176 CT ABD & PELVIS W/O CONTRAST: CPT

## 2025-01-01 PROCEDURE — 99233 SBSQ HOSP IP/OBS HIGH 50: CPT | Performed by: STUDENT IN AN ORGANIZED HEALTH CARE EDUCATION/TRAINING PROGRAM

## 2025-01-01 RX ORDER — SULFAMETHOXAZOLE AND TRIMETHOPRIM 800; 160 MG/1; MG/1
1 TABLET ORAL EVERY 12 HOURS
Status: DISCONTINUED | OUTPATIENT
Start: 2025-01-01 | End: 2025-01-01 | Stop reason: HOSPADM

## 2025-01-01 RX ORDER — ONDANSETRON 4 MG/1
4 TABLET, ORALLY DISINTEGRATING ORAL EVERY 6 HOURS PRN
Qty: 15 TABLET | Refills: 10 | Status: SHIPPED | OUTPATIENT
Start: 2025-01-01 | End: 2026-05-19

## 2025-01-01 RX ORDER — LABETALOL HYDROCHLORIDE 5 MG/ML
10 INJECTION, SOLUTION INTRAVENOUS EVERY 4 HOURS PRN
Status: DISCONTINUED | OUTPATIENT
Start: 2025-01-01 | End: 2025-01-01 | Stop reason: HOSPADM

## 2025-01-01 RX ORDER — ONDANSETRON 4 MG/1
4 TABLET, ORALLY DISINTEGRATING ORAL EVERY 4 HOURS PRN
Status: DISCONTINUED | OUTPATIENT
Start: 2025-01-01 | End: 2025-01-01 | Stop reason: HOSPADM

## 2025-01-01 RX ORDER — ACETAMINOPHEN 650 MG/1
650 SUPPOSITORY RECTAL EVERY 4 HOURS PRN
Qty: 12 SUPPOSITORY | Refills: 0
Start: 2025-01-01 | End: 2025-01-01

## 2025-01-01 RX ORDER — BISACODYL 10 MG
10 SUPPOSITORY, RECTAL RECTAL
Status: DISCONTINUED | OUTPATIENT
Start: 2025-01-01 | End: 2025-01-01 | Stop reason: HOSPADM

## 2025-01-01 RX ORDER — LEVOTHYROXINE SODIUM 88 UG/1
88 TABLET ORAL
Qty: 30 TABLET | Refills: 11 | Status: SHIPPED | OUTPATIENT
Start: 2025-01-01 | End: 2025-05-27

## 2025-01-01 RX ORDER — CEFTRIAXONE 1 G/1
1000 INJECTION, POWDER, FOR SOLUTION INTRAMUSCULAR; INTRAVENOUS ONCE
Status: COMPLETED | OUTPATIENT
Start: 2025-01-01 | End: 2025-01-01

## 2025-01-01 RX ORDER — MORPHINE SULFATE 100 MG/5ML
5-10 SOLUTION ORAL
Qty: 240 ML | Refills: 0 | Status: SHIPPED | OUTPATIENT
Start: 2025-01-01 | End: 2025-01-01 | Stop reason: SDUPTHER

## 2025-01-01 RX ORDER — CARBOXYMETHYLCELLULOSE SODIUM 5 MG/ML
1 SOLUTION/ DROPS OPHTHALMIC PRN
Qty: 15 ML | Refills: 3 | Status: SHIPPED | OUTPATIENT
Start: 2025-01-01 | End: 2026-05-19

## 2025-01-01 RX ORDER — ONDANSETRON 2 MG/ML
4 INJECTION INTRAMUSCULAR; INTRAVENOUS EVERY 4 HOURS PRN
Status: DISCONTINUED | OUTPATIENT
Start: 2025-01-01 | End: 2025-01-01 | Stop reason: HOSPADM

## 2025-01-01 RX ORDER — SULFAMETHOXAZOLE AND TRIMETHOPRIM 800; 160 MG/1; MG/1
1 TABLET ORAL 2 TIMES DAILY
Qty: 14 TABLET | Refills: 0 | Status: ON HOLD | OUTPATIENT
Start: 2025-01-01 | End: 2025-01-01

## 2025-01-01 RX ORDER — SODIUM CHLORIDE, SODIUM LACTATE, POTASSIUM CHLORIDE, CALCIUM CHLORIDE 600; 310; 30; 20 MG/100ML; MG/100ML; MG/100ML; MG/100ML
INJECTION, SOLUTION INTRAVENOUS CONTINUOUS
Status: DISCONTINUED | OUTPATIENT
Start: 2025-01-01 | End: 2025-01-01

## 2025-01-01 RX ORDER — LACTULOSE 10 G/15ML
10 SOLUTION ORAL
Qty: 450 ML | Refills: 0
Start: 2025-01-01 | End: 2025-01-01 | Stop reason: SDUPTHER

## 2025-01-01 RX ORDER — HALOPERIDOL 2 MG/ML
2 SOLUTION ORAL
Qty: 15 ML | Refills: 0
Start: 2025-01-01 | End: 2025-01-01

## 2025-01-01 RX ORDER — ACETAMINOPHEN 500 MG
1000 TABLET ORAL EVERY 6 HOURS PRN
Status: DISCONTINUED | OUTPATIENT
Start: 2025-01-01 | End: 2025-01-01 | Stop reason: HOSPADM

## 2025-01-01 RX ORDER — ONDANSETRON 4 MG/1
4 TABLET, ORALLY DISINTEGRATING ORAL EVERY 4 HOURS PRN
Qty: 10 TABLET | Refills: 0
Start: 2025-01-01 | End: 2025-01-01

## 2025-01-01 RX ORDER — HALOPERIDOL 5 MG/ML
1 INJECTION INTRAMUSCULAR
Status: DISCONTINUED | OUTPATIENT
Start: 2025-01-01 | End: 2025-01-01 | Stop reason: HOSPADM

## 2025-01-01 RX ORDER — LACTULOSE 10 G/15ML
10 SOLUTION ORAL
Status: DISCONTINUED | OUTPATIENT
Start: 2025-01-01 | End: 2025-01-01 | Stop reason: HOSPADM

## 2025-01-01 RX ORDER — SODIUM CHLORIDE 9 MG/ML
1000 INJECTION, SOLUTION INTRAVENOUS ONCE
Status: COMPLETED | OUTPATIENT
Start: 2025-01-01 | End: 2025-01-01

## 2025-01-01 RX ORDER — ACETAMINOPHEN 325 MG/1
650 TABLET ORAL EVERY 6 HOURS PRN
Status: DISCONTINUED | OUTPATIENT
Start: 2025-01-01 | End: 2025-01-01

## 2025-01-01 RX ORDER — MORPHINE SULFATE 100 MG/5ML
10-20 SOLUTION ORAL
Qty: 240 ML | Refills: 0 | Status: SHIPPED | OUTPATIENT
Start: 2025-01-01 | End: 2025-05-27

## 2025-01-01 RX ORDER — ACETAMINOPHEN 500 MG
1000 TABLET ORAL EVERY 6 HOURS PRN
Qty: 30 TABLET | Refills: 10 | Status: SHIPPED | OUTPATIENT
Start: 2025-01-01 | End: 2026-05-19

## 2025-01-01 RX ORDER — CEFAZOLIN 2 G/1
2 INJECTION, POWDER, FOR SOLUTION INTRAMUSCULAR; INTRAVENOUS ONCE
Status: COMPLETED | OUTPATIENT
Start: 2025-01-01 | End: 2025-01-01

## 2025-01-01 RX ORDER — MORPHINE SULFATE 100 MG/5ML
5 SOLUTION ORAL
Refills: 0 | Status: DISCONTINUED | OUTPATIENT
Start: 2025-01-01 | End: 2025-01-01 | Stop reason: HOSPADM

## 2025-01-01 RX ORDER — CARBOXYMETHYLCELLULOSE SODIUM 5 MG/ML
1 SOLUTION/ DROPS OPHTHALMIC PRN
Status: DISCONTINUED | OUTPATIENT
Start: 2025-01-01 | End: 2025-01-01 | Stop reason: HOSPADM

## 2025-01-01 RX ORDER — SODIUM CHLORIDE 9 MG/ML
INJECTION, SOLUTION INTRAVENOUS CONTINUOUS
Status: DISCONTINUED | OUTPATIENT
Start: 2025-01-01 | End: 2025-01-01 | Stop reason: HOSPADM

## 2025-01-01 RX ORDER — ACETAMINOPHEN 500 MG
1000 TABLET ORAL EVERY 6 HOURS PRN
Qty: 30 TABLET | Refills: 0
Start: 2025-01-01 | End: 2025-01-01

## 2025-01-01 RX ORDER — SULFAMETHOXAZOLE AND TRIMETHOPRIM 800; 160 MG/1; MG/1
1 TABLET ORAL EVERY 12 HOURS
Qty: 7 TABLET | Refills: 0 | Status: ACTIVE | OUTPATIENT
Start: 2025-01-01 | End: 2025-01-01 | Stop reason: SDUPTHER

## 2025-01-01 RX ORDER — BISACODYL 10 MG
10 SUPPOSITORY, RECTAL RECTAL PRN
Qty: 5 SUPPOSITORY | Refills: 10 | Status: SHIPPED | OUTPATIENT
Start: 2025-01-01 | End: 2026-05-19

## 2025-01-01 RX ORDER — BISACODYL 10 MG
10 SUPPOSITORY, RECTAL RECTAL
Qty: 10 SUPPOSITORY | Refills: 0
Start: 2025-01-01 | End: 2025-01-01

## 2025-01-01 RX ORDER — SODIUM CHLORIDE, SODIUM LACTATE, POTASSIUM CHLORIDE, AND CALCIUM CHLORIDE .6; .31; .03; .02 G/100ML; G/100ML; G/100ML; G/100ML
1000 INJECTION, SOLUTION INTRAVENOUS ONCE
Status: COMPLETED | OUTPATIENT
Start: 2025-01-01 | End: 2025-01-01

## 2025-01-01 RX ORDER — MORPHINE SULFATE 100 MG/5ML
10 SOLUTION ORAL
Refills: 0 | Status: DISCONTINUED | OUTPATIENT
Start: 2025-01-01 | End: 2025-01-01 | Stop reason: HOSPADM

## 2025-01-01 RX ORDER — ONDANSETRON 2 MG/ML
4 INJECTION INTRAMUSCULAR; INTRAVENOUS ONCE
Status: COMPLETED | OUTPATIENT
Start: 2025-01-01 | End: 2025-01-01

## 2025-01-01 RX ORDER — LORAZEPAM 2 MG/ML
.5-1 CONCENTRATE ORAL
Qty: 360 ML | Refills: 0 | Status: SHIPPED | OUTPATIENT
Start: 2025-01-01 | End: 2026-05-19

## 2025-01-01 RX ORDER — ACETAMINOPHEN 650 MG/1
650 SUPPOSITORY RECTAL EVERY 4 HOURS PRN
Status: DISCONTINUED | OUTPATIENT
Start: 2025-01-01 | End: 2025-01-01 | Stop reason: HOSPADM

## 2025-01-01 RX ORDER — MORPHINE SULFATE 100 MG/5ML
5-20 SOLUTION ORAL
Qty: 30 ML | Refills: 0
Start: 2025-01-01 | End: 2025-01-01

## 2025-01-01 RX ORDER — LEVOTHYROXINE SODIUM 88 UG/1
88 TABLET ORAL
Status: DISCONTINUED | OUTPATIENT
Start: 2025-01-01 | End: 2025-01-01 | Stop reason: HOSPADM

## 2025-01-01 RX ORDER — CARBOXYMETHYLCELLULOSE SODIUM 5 MG/ML
1 SOLUTION/ DROPS OPHTHALMIC PRN
Qty: 15 ML | Refills: 0
Start: 2025-01-01 | End: 2025-01-01 | Stop reason: SDUPTHER

## 2025-01-01 RX ORDER — ACETAMINOPHEN 650 MG/1
650 SUPPOSITORY RECTAL EVERY 6 HOURS PRN
Qty: 5 SUPPOSITORY | Refills: 10 | Status: SHIPPED | OUTPATIENT
Start: 2025-01-01 | End: 2025-05-27

## 2025-01-01 RX ORDER — HALOPERIDOL 2 MG/ML
2 SOLUTION ORAL
Status: DISCONTINUED | OUTPATIENT
Start: 2025-01-01 | End: 2025-01-01 | Stop reason: HOSPADM

## 2025-01-01 RX ORDER — SODIUM CHLORIDE 9 MG/ML
INJECTION, SOLUTION INTRAVENOUS CONTINUOUS
Status: DISCONTINUED | OUTPATIENT
Start: 2025-01-01 | End: 2025-01-01

## 2025-01-01 RX ORDER — LACTULOSE 10 G/15ML
10 SOLUTION ORAL
Qty: 450 ML | Refills: 11 | Status: SHIPPED | OUTPATIENT
Start: 2025-01-01 | End: 2025-05-27

## 2025-01-01 RX ORDER — ACETAMINOPHEN 325 MG/1
650 TABLET ORAL EVERY 6 HOURS PRN
Status: DISCONTINUED | OUTPATIENT
Start: 2025-01-01 | End: 2025-01-01 | Stop reason: HOSPADM

## 2025-01-01 RX ORDER — SENNA AND DOCUSATE SODIUM 50; 8.6 MG/1; MG/1
2 TABLET, FILM COATED ORAL 2 TIMES DAILY PRN
Qty: 28 TABLET | Refills: 10 | Status: SHIPPED | OUTPATIENT
Start: 2025-01-01 | End: 2026-05-19

## 2025-01-01 RX ORDER — SULFAMETHOXAZOLE AND TRIMETHOPRIM 800; 160 MG/1; MG/1
1 TABLET ORAL EVERY 12 HOURS
Qty: 7 TABLET | Refills: 0 | Status: SHIPPED | OUTPATIENT
Start: 2025-01-01 | End: 2025-01-01

## 2025-01-01 RX ADMIN — CEFTRIAXONE SODIUM 1000 MG: 10 INJECTION, POWDER, FOR SOLUTION INTRAVENOUS at 10:16

## 2025-01-01 RX ADMIN — CEFAZOLIN 2 G: 2 INJECTION, POWDER, FOR SOLUTION INTRAMUSCULAR; INTRAVENOUS at 20:09

## 2025-01-01 RX ADMIN — RIVAROXABAN 10 MG: 10 TABLET, FILM COATED ORAL at 18:02

## 2025-01-01 RX ADMIN — RIVAROXABAN 10 MG: 10 TABLET, FILM COATED ORAL at 18:37

## 2025-01-01 RX ADMIN — CEFAZOLIN 2 G: 2 INJECTION, POWDER, FOR SOLUTION INTRAMUSCULAR; INTRAVENOUS at 04:16

## 2025-01-01 RX ADMIN — CEFTRIAXONE SODIUM 1000 MG: 10 INJECTION, POWDER, FOR SOLUTION INTRAVENOUS at 05:18

## 2025-01-01 RX ADMIN — SODIUM CHLORIDE, POTASSIUM CHLORIDE, SODIUM LACTATE AND CALCIUM CHLORIDE 1000 ML: 600; 310; 30; 20 INJECTION, SOLUTION INTRAVENOUS at 08:30

## 2025-01-01 RX ADMIN — LEVOTHYROXINE SODIUM 88 MCG: 0.09 TABLET ORAL at 06:21

## 2025-01-01 RX ADMIN — SODIUM CHLORIDE: 9 INJECTION, SOLUTION INTRAVENOUS at 21:21

## 2025-01-01 RX ADMIN — LEVOTHYROXINE SODIUM 88 MCG: 0.09 TABLET ORAL at 06:59

## 2025-01-01 RX ADMIN — SULFAMETHOXAZOLE AND TRIMETHOPRIM 1 TABLET: 800; 160 TABLET ORAL at 07:01

## 2025-01-01 RX ADMIN — CEFAZOLIN 2 G: 2 INJECTION, POWDER, FOR SOLUTION INTRAMUSCULAR; INTRAVENOUS at 11:24

## 2025-01-01 RX ADMIN — ONDANSETRON 4 MG: 2 INJECTION INTRAMUSCULAR; INTRAVENOUS at 10:36

## 2025-01-01 RX ADMIN — LABETALOL HYDROCHLORIDE 10 MG: 5 INJECTION, SOLUTION INTRAVENOUS at 16:19

## 2025-01-01 RX ADMIN — CEFAZOLIN 2 G: 2 INJECTION, POWDER, FOR SOLUTION INTRAMUSCULAR; INTRAVENOUS at 20:38

## 2025-01-01 RX ADMIN — CEFTRIAXONE SODIUM 1000 MG: 1 INJECTION, POWDER, FOR SOLUTION INTRAMUSCULAR; INTRAVENOUS at 11:48

## 2025-01-01 RX ADMIN — SODIUM CHLORIDE: 9 INJECTION, SOLUTION INTRAVENOUS at 20:07

## 2025-01-01 RX ADMIN — CEFAZOLIN 2 G: 2 INJECTION, POWDER, FOR SOLUTION INTRAMUSCULAR; INTRAVENOUS at 04:32

## 2025-01-01 RX ADMIN — LEVOTHYROXINE SODIUM 88 MCG: 0.09 TABLET ORAL at 05:17

## 2025-01-01 RX ADMIN — Medication 5 MG: at 21:20

## 2025-01-01 RX ADMIN — RIVAROXABAN 10 MG: 10 TABLET, FILM COATED ORAL at 17:28

## 2025-01-01 RX ADMIN — SODIUM CHLORIDE, POTASSIUM CHLORIDE, SODIUM LACTATE AND CALCIUM CHLORIDE: 600; 310; 30; 20 INJECTION, SOLUTION INTRAVENOUS at 13:15

## 2025-01-01 RX ADMIN — CEFEPIME 2 G: 2 INJECTION, POWDER, FOR SOLUTION INTRAVENOUS at 15:27

## 2025-01-01 RX ADMIN — SODIUM CHLORIDE 1000 ML: 9 INJECTION, SOLUTION INTRAVENOUS at 19:07

## 2025-01-01 RX ADMIN — SODIUM CHLORIDE: 9 INJECTION, SOLUTION INTRAVENOUS at 08:21

## 2025-01-01 RX ADMIN — SODIUM CHLORIDE, POTASSIUM CHLORIDE, SODIUM LACTATE AND CALCIUM CHLORIDE: 600; 310; 30; 20 INJECTION, SOLUTION INTRAVENOUS at 05:00

## 2025-01-01 RX ADMIN — CEFAZOLIN 2 G: 2 INJECTION, POWDER, FOR SOLUTION INTRAMUSCULAR; INTRAVENOUS at 12:25

## 2025-01-01 RX ADMIN — Medication 5 MG: at 18:02

## 2025-01-01 SDOH — ECONOMIC STABILITY: TRANSPORTATION INSECURITY
IN THE PAST 12 MONTHS, HAS THE LACK OF TRANSPORTATION KEPT YOU FROM MEDICAL APPOINTMENTS OR FROM GETTING MEDICATIONS?: NO

## 2025-01-01 SDOH — ECONOMIC STABILITY: TRANSPORTATION INSECURITY
IN THE PAST 12 MONTHS, HAS LACK OF RELIABLE TRANSPORTATION KEPT YOU FROM MEDICAL APPOINTMENTS, MEETINGS, WORK OR FROM GETTING THINGS NEEDED FOR DAILY LIVING?: NO

## 2025-01-01 SDOH — ECONOMIC STABILITY: GENERAL

## 2025-01-01 ASSESSMENT — FIBROSIS 4 INDEX
FIB4 SCORE: 1.08
FIB4 SCORE: 1.87
FIB4 SCORE: 2.02
FIB4 SCORE: 1.41
FIB4 SCORE: 2.02

## 2025-01-01 ASSESSMENT — COGNITIVE AND FUNCTIONAL STATUS - GENERAL
TURNING FROM BACK TO SIDE WHILE IN FLAT BAD: A LITTLE
EATING MEALS: A LITTLE
MOVING FROM LYING ON BACK TO SITTING ON SIDE OF FLAT BED: A LITTLE
STANDING UP FROM CHAIR USING ARMS: TOTAL
DRESSING REGULAR UPPER BODY CLOTHING: A LOT
DAILY ACTIVITIY SCORE: 15
DRESSING REGULAR UPPER BODY CLOTHING: A LITTLE
MOVING TO AND FROM BED TO CHAIR: A LITTLE
MOVING TO AND FROM BED TO CHAIR: TOTAL
TURNING FROM BACK TO SIDE WHILE IN FLAT BAD: A LITTLE
PERSONAL GROOMING: A LOT
PERSONAL GROOMING: TOTAL
HELP NEEDED FOR BATHING: A LOT
TOILETING: A LOT
DRESSING REGULAR LOWER BODY CLOTHING: A LOT
CLIMB 3 TO 5 STEPS WITH RAILING: A LOT
HELP NEEDED FOR BATHING: TOTAL
MOVING TO AND FROM BED TO CHAIR: A LOT
SUGGESTED CMS G CODE MODIFIER DAILY ACTIVITY: CK
TURNING FROM BACK TO SIDE WHILE IN FLAT BAD: TOTAL
DAILY ACTIVITIY SCORE: 13
WALKING IN HOSPITAL ROOM: A LOT
SUGGESTED CMS G CODE MODIFIER MOBILITY: CL
MOBILITY SCORE: 14
STANDING UP FROM CHAIR USING ARMS: A LITTLE
TOILETING: A LITTLE
WALKING IN HOSPITAL ROOM: A LITTLE
CLIMB 3 TO 5 STEPS WITH RAILING: TOTAL
DRESSING REGULAR LOWER BODY CLOTHING: A LITTLE
SUGGESTED CMS G CODE MODIFIER DAILY ACTIVITY: CL
MOVING FROM LYING ON BACK TO SITTING ON SIDE OF FLAT BED: A LOT
STANDING UP FROM CHAIR USING ARMS: A LOT
SUGGESTED CMS G CODE MODIFIER MOBILITY: CK
WALKING IN HOSPITAL ROOM: TOTAL
MOBILITY SCORE: 7
MOVING FROM LYING ON BACK TO SITTING ON SIDE OF FLAT BED: A LITTLE
CLIMB 3 TO 5 STEPS WITH RAILING: A LOT
MOBILITY SCORE: 17
SUGGESTED CMS G CODE MODIFIER MOBILITY: CM

## 2025-01-01 ASSESSMENT — LIFESTYLE VARIABLES
CONSUMPTION TOTAL: NEGATIVE
HAVE PEOPLE ANNOYED YOU BY CRITICIZING YOUR DRINKING: NO
TOTAL SCORE: 0
EVER HAD A DRINK FIRST THING IN THE MORNING TO STEADY YOUR NERVES TO GET RID OF A HANGOVER: NO
TOTAL SCORE: 0
EVER HAD A DRINK FIRST THING IN THE MORNING TO STEADY YOUR NERVES TO GET RID OF A HANGOVER: NO
HAVE YOU EVER FELT YOU SHOULD CUT DOWN ON YOUR DRINKING: NO
DOES PATIENT WANT TO STOP DRINKING: NO
TOTAL SCORE: 0
ON A TYPICAL DAY WHEN YOU DRINK ALCOHOL HOW MANY DRINKS DO YOU HAVE: 0
AVERAGE NUMBER OF DAYS PER WEEK YOU HAVE A DRINK CONTAINING ALCOHOL: 0
ALCOHOL_USE: NO
TOTAL SCORE: 0
CONSUMPTION TOTAL: NEGATIVE
HAVE PEOPLE ANNOYED YOU BY CRITICIZING YOUR DRINKING: NO
HOW MANY TIMES IN THE PAST YEAR HAVE YOU HAD 5 OR MORE DRINKS IN A DAY: 0
AVERAGE NUMBER OF DAYS PER WEEK YOU HAVE A DRINK CONTAINING ALCOHOL: 0
ON A TYPICAL DAY WHEN YOU DRINK ALCOHOL HOW MANY DRINKS DO YOU HAVE: 0
TOTAL SCORE: 0
ALCOHOL_USE: NO
DOES PATIENT WANT TO STOP DRINKING: NO
EVER FELT BAD OR GUILTY ABOUT YOUR DRINKING: NO
TOTAL SCORE: 0
HOW MANY TIMES IN THE PAST YEAR HAVE YOU HAD 5 OR MORE DRINKS IN A DAY: 0
HAVE YOU EVER FELT YOU SHOULD CUT DOWN ON YOUR DRINKING: NO
EVER FELT BAD OR GUILTY ABOUT YOUR DRINKING: NO

## 2025-01-01 ASSESSMENT — PATIENT HEALTH QUESTIONNAIRE - PHQ9
SUM OF ALL RESPONSES TO PHQ9 QUESTIONS 1 AND 2: 0
1. LITTLE INTEREST OR PLEASURE IN DOING THINGS: NOT AT ALL
2. FEELING DOWN, DEPRESSED, IRRITABLE, OR HOPELESS: NOT AT ALL
CLINICAL INTERPRETATION OF PHQ2 SCORE: 0
2. FEELING DOWN, DEPRESSED, IRRITABLE, OR HOPELESS: NOT AT ALL
SUM OF ALL RESPONSES TO PHQ9 QUESTIONS 1 AND 2: 0
1. LITTLE INTEREST OR PLEASURE IN DOING THINGS: NOT AT ALL

## 2025-01-01 ASSESSMENT — PAIN SCALES - PAIN ASSESSMENT IN ADVANCED DEMENTIA (PAINAD)
CONSOLABILITY: 0 - NO NEED TO CONSOLE.
BODYLANGUAGE: RELAXED
CONSOLABILITY: 1 - DISTRACTED OR REASSURED BY VOICE OR TOUCH.
CONSOLABILITY: 0 - NO NEED TO CONSOLE.
FACIALEXPRESSION: 0 - SMILING OR INEXPRESSIVE.
FACIALEXPRESSION: 1 - SAD. FRIGHTENED. FROWN.
CONSOLABILITY: 0 - NO NEED TO CONSOLE.
TOTALSCORE: 2
NEGVOCALIZATION: 0 - NONE.
BODYLANGUAGE: 0 - RELAXED.
CONSOLABILITY: 0 - NO NEED TO CONSOLE.
FACIALEXPRESSION: 0 - SMILING OR INEXPRESSIVE.
FACIALEXPRESSION: 1 - SAD. FRIGHTENED. FROWN.
NEGVOCALIZATION: 0 - NONE.
TOTALSCORE: 1
BREATHING: NORMAL
BODYLANGUAGE: 0 - RELAXED.
FACIALEXPRESSION: 0 - SMILING OR INEXPRESSIVE.
FACIALEXPRESSION: 0 - SMILING OR INEXPRESSIVE.
TOTALSCORE: 0
TOTALSCORE: 3
TOTALSCORE: 0
NEGVOCALIZATION: 0 - NONE.
BODYLANGUAGE: 1 - TENSE. DISTRESSED PACING. FIDGETING.
TOTALSCORE: 0
NEGVOCALIZATION: 0 - NONE.
TOTALSCORE: 3
CONSOLABILITY: 0 - NO NEED TO CONSOLE.
FACIALEXPRESSION: SMILING OR INEXPRESSIVE
NEGVOCALIZATION: 0 - NONE.
CONSOLABILITY: NO NEED TO CONSOLE
BODYLANGUAGE: 0 - RELAXED.
CONSOLABILITY: 2 - UNABLE TO CONSOLE, DISTRACT OR REASSURE.
BODYLANGUAGE: 0 - RELAXED.
BODYLANGUAGE: 0 - RELAXED.
FACIALEXPRESSION: 0 - SMILING OR INEXPRESSIVE.
BODYLANGUAGE: 0 - RELAXED.
TOTALSCORE: 0
CONSOLABILITY: 0 - NO NEED TO CONSOLE.
FACIALEXPRESSION: 1 - SAD. FRIGHTENED. FROWN.
TOTALSCORE: 0
BODYLANGUAGE: 0 - RELAXED.
NEGVOCALIZATION: 0 - NONE.

## 2025-01-01 ASSESSMENT — ACTIVITIES OF DAILY LIVING (ADL)
TOILETING_REQUIRES_ASSISTANCE: 1
AMBULATION ASSISTANCE: ONE PERSON
DRESSING_REQUIRES_ASSISTANCE: 1
AMBULATION ASSISTANCE: ONE PERSON
MONEY MANAGEMENT (EXPENSES/BILLS): TOTALLY DEPENDENT
PHYSICAL_TRANSFER_REQUIRES_ASSISTANCE: 1
GROOMING_REQUIRES_ASSISTANCE: 1
BATHING_REQUIRES_ASSISTANCE: 1
AMBULATION_REQUIRES_ASSISTANCE: 1
SHOPPING_REQUIRES_ASSISTANCE: 1
EATING_REQUIRES_ASSISTANCE: 1
BATHING_REQUIRES_ASSISTANCE: 1
CURRENT_FUNCTION: STAND BY ASSIST
AMBULATION ASSISTANCE: NON-AMBULATORY
PHYSICAL_TRANSFER_REQUIRES_ASSISTANCE: 1
AMBULATION_REQUIRES_ASSISTANCE: 1
DRESSING_REQUIRES_ASSISTANCE: 1
CURRENT_FUNCTION: ONE PERSON
LAUNDRY_REQUIRES_ASSISTANCE: 1
AMBULATION ASSISTANCE: NON-AMBULATORY
AMBULATION ASSISTANCE: NON-AMBULATORY
CONTINENCE_REQUIRES_ASSISTANCE: 1
CURRENT_FUNCTION: ONE PERSON
AMBULATION ASSISTANCE: STAND BY ASSIST

## 2025-01-01 ASSESSMENT — SOCIAL DETERMINANTS OF HEALTH (SDOH)
WITHIN THE PAST 12 MONTHS, THE FOOD YOU BOUGHT JUST DIDN'T LAST AND YOU DIDN'T HAVE MONEY TO GET MORE: NEVER TRUE
WITHIN THE PAST 12 MONTHS, YOU WORRIED THAT YOUR FOOD WOULD RUN OUT BEFORE YOU GOT THE MONEY TO BUY MORE: NEVER TRUE
WITHIN THE LAST YEAR, HAVE YOU BEEN KICKED, HIT, SLAPPED, OR OTHERWISE PHYSICALLY HURT BY YOUR PARTNER OR EX-PARTNER?: NO
IN THE PAST 12 MONTHS, HAS THE ELECTRIC, GAS, OIL, OR WATER COMPANY THREATENED TO SHUT OFF SERVICE IN YOUR HOME?: NO
WITHIN THE LAST YEAR, HAVE YOU BEEN HUMILIATED OR EMOTIONALLY ABUSED IN OTHER WAYS BY YOUR PARTNER OR EX-PARTNER?: NO
WITHIN THE PAST 12 MONTHS, YOU WORRIED THAT YOUR FOOD WOULD RUN OUT BEFORE YOU GOT THE MONEY TO BUY MORE: NEVER TRUE
IN THE PAST 12 MONTHS, HAS THE ELECTRIC, GAS, OIL, OR WATER COMPANY THREATENED TO SHUT OFF SERVICE IN YOUR HOME?: NO
ACTIVE STRESSOR - NO STRESS FACTORS: 1
WITHIN THE LAST YEAR, HAVE TO BEEN RAPED OR FORCED TO HAVE ANY KIND OF SEXUAL ACTIVITY BY YOUR PARTNER OR EX-PARTNER?: NO
WITHIN THE PAST 12 MONTHS, THE FOOD YOU BOUGHT JUST DIDN'T LAST AND YOU DIDN'T HAVE MONEY TO GET MORE: NEVER TRUE
ACTIVE STRESSOR - NO STRESS FACTORS: 1
WITHIN THE LAST YEAR, HAVE YOU BEEN AFRAID OF YOUR PARTNER OR EX-PARTNER?: NO
WITHIN THE PAST 12 MONTHS, THE FOOD YOU BOUGHT JUST DIDN'T LAST AND YOU DIDN'T HAVE MONEY TO GET MORE: NEVER TRUE
WITHIN THE LAST YEAR, HAVE TO BEEN RAPED OR FORCED TO HAVE ANY KIND OF SEXUAL ACTIVITY BY YOUR PARTNER OR EX-PARTNER?: NO
WITHIN THE LAST YEAR, HAVE YOU BEEN AFRAID OF YOUR PARTNER OR EX-PARTNER?: NO
WITHIN THE PAST 12 MONTHS, YOU WORRIED THAT YOUR FOOD WOULD RUN OUT BEFORE YOU GOT THE MONEY TO BUY MORE: NEVER TRUE
IN THE PAST 12 MONTHS, HAS THE ELECTRIC, GAS, OIL, OR WATER COMPANY THREATENED TO SHUT OFF SERVICE IN YOUR HOME?: NO
ACTIVE STRESSOR - HEALTH CHANGES: 1
WITHIN THE LAST YEAR, HAVE YOU BEEN KICKED, HIT, SLAPPED, OR OTHERWISE PHYSICALLY HURT BY YOUR PARTNER OR EX-PARTNER?: NO
ACTIVE STRESSOR - LOSS OF CONTROL: 1
ACTIVE STRESSOR - HEALTH CHANGES: 1
WITHIN THE LAST YEAR, HAVE YOU BEEN HUMILIATED OR EMOTIONALLY ABUSED IN OTHER WAYS BY YOUR PARTNER OR EX-PARTNER?: NO

## 2025-01-01 ASSESSMENT — ENCOUNTER SYMPTOMS
SNORING: 1
STOOL FREQUENCY: LESS THAN DAILY
DECREASED ORAL INTAKE: 1
BOWEL INCONTINENCE: 1
FATIGUES EASILY: 1
LAST BOWEL MOVEMENT: 67343
LAST BOWEL MOVEMENT: 67345
DESCRIPTION OF MEMORY LOSS: IMMEDIATE
DRY SKIN: 1
LOWER EXTREMITY EDEMA: 1
MUSCLE WEAKNESS: 1
MUSCLE WEAKNESS: 1
DECREASED TO NO URINARY OUTPUT: 1
DECREASED ORAL INTAKE: 1
STOOL FREQUENCY: LESS THAN DAILY
LIMITED RANGE OF MOTION: 1
INCREASED SLEEPING: 1
WEAKNESS: 1
DEPRESSED MOOD: 1
MUSCLE WEAKNESS: 1
APNEIC BREATHING: 1
FORGETFULNESS: 1
STOOL FREQUENCY: LESS THAN DAILY
INCREASED FATIGUE: 1
FORGETFULNESS: 1
FATIGUE: 1
DRY SKIN: 1
SLEEP QUALITY: ADEQUATE
DECREASED TO NO URINARY OUTPUT: 1
BOWEL INCONTINENCE: 1
DECREASED TO NO URINARY OUTPUT: 1
SLEEP QUALITY: FAIR
BOWEL INCONTINENCE: 1
MOTTLING: 1
MUSCLE WEAKNESS: 1
INCREASED SLEEPING: 1
FORGETFULNESS: 1
DECREASED TO NO URINARY OUTPUT: 1
BOWEL INCONTINENCE: 1
DESCRIPTION OF MEMORY LOSS: SHORT TERM
STOOL FREQUENCY: LESS THAN DAILY
LIMITED RANGE OF MOTION: 1
LOWER EXTREMITY EDEMA: 1
DRY SKIN: 1
MUSCLE WEAKNESS: 1
LAST BOWEL MOVEMENT: 67345
INCREASED FATIGUE: 1
SLEEP QUALITY: ADEQUATE
DECREASED ORAL INTAKE: 1
DESCRIPTION OF MEMORY LOSS: LONG TERM
INCREASED SLEEPING: 1
FATIGUES EASILY: 1
LOWER EXTREMITY EDEMA: 1
INCREASED SLEEPING: 1
SLEEP QUALITY: ADEQUATE
DESCRIPTION OF MEMORY LOSS: IMMEDIATE
LAST BOWEL MOVEMENT: 67345
DECREASED ORAL INTAKE: 1
LOWER EXTREMITY EDEMA: 1
FATIGUE: 1
FATIGUES EASILY: 1
DECREASED ORAL INTAKE: 1
STOOL FREQUENCY: LESS THAN DAILY
DESCRIPTION OF MEMORY LOSS: LONG TERM
FATIGUES EASILY: 1
DECREASED TO NO URINARY OUTPUT: 1
STOOL FREQUENCY: LESS THAN DAILY
DRY SKIN: 1
DECREASED ORAL INTAKE: 1
FATIGUES EASILY: 1
INCREASED SLEEPING: 1
DESCRIPTION OF MEMORY LOSS: SHORT TERM
FATIGUES EASILY: 1
DESCRIPTION OF MEMORY LOSS: LONG TERM
INCREASED FATIGUE: 1
FATIGUE: 1
DRY SKIN: 1
INCREASED SLEEPING: 1
DESCRIPTION OF MEMORY LOSS: SHORT TERM

## 2025-01-01 ASSESSMENT — PAIN DESCRIPTION - PAIN TYPE
TYPE: ACUTE PAIN

## 2025-01-01 ASSESSMENT — GAIT ASSESSMENTS
DISTANCE (FEET): 150
ASSISTIVE DEVICE: HAND HELD ASSIST
DEVIATION: DECREASED BASE OF SUPPORT;BRADYKINETIC
GAIT LEVEL OF ASSIST: CONTACT GUARD ASSIST

## 2025-03-03 ENCOUNTER — HOSPITAL ENCOUNTER (OUTPATIENT)
Dept: RADIOLOGY | Facility: MEDICAL CENTER | Age: 75
End: 2025-03-03
Attending: PHYSICIAN ASSISTANT
Payer: MEDICARE

## 2025-03-03 DIAGNOSIS — D17.71 BENIGN LIPOMATOUS NEOPLASM OF KIDNEY: ICD-10-CM

## 2025-03-03 PROCEDURE — 76775 US EXAM ABDO BACK WALL LIM: CPT

## 2025-03-17 ENCOUNTER — APPOINTMENT (OUTPATIENT)
Dept: URBAN - METROPOLITAN AREA CLINIC 22 | Facility: CLINIC | Age: 75
Setting detail: DERMATOLOGY
End: 2025-03-17

## 2025-03-17 DIAGNOSIS — L82.1 OTHER SEBORRHEIC KERATOSIS: ICD-10-CM

## 2025-03-17 DIAGNOSIS — Z71.89 OTHER SPECIFIED COUNSELING: ICD-10-CM

## 2025-03-17 DIAGNOSIS — S31000A OPEN WOUND(S) (MULTIPLE) OF UNSPECIFIED SITE(S), WITHOUT MENTION OF COMPLICATION: ICD-10-CM

## 2025-03-17 PROBLEM — S81.801A UNSPECIFIED OPEN WOUND, RIGHT LOWER LEG, INITIAL ENCOUNTER: Status: ACTIVE | Noted: 2025-03-17

## 2025-03-17 PROCEDURE — ? COUNSELING

## 2025-03-17 PROCEDURE — ? ADDITIONAL NOTES

## 2025-03-17 PROCEDURE — 99203 OFFICE O/P NEW LOW 30 MIN: CPT

## 2025-03-17 ASSESSMENT — LOCATION DETAILED DESCRIPTION DERM
LOCATION DETAILED: SUPERIOR LUMBAR SPINE
LOCATION DETAILED: RIGHT LATERAL PROXIMAL CALF
LOCATION DETAILED: LEFT LATERAL SUPERIOR CHEST
LOCATION DETAILED: LEFT MEDIAL FOREHEAD

## 2025-03-17 ASSESSMENT — LOCATION SIMPLE DESCRIPTION DERM
LOCATION SIMPLE: LEFT FOREHEAD
LOCATION SIMPLE: CHEST
LOCATION SIMPLE: LOWER BACK
LOCATION SIMPLE: RIGHT LOWER LEG

## 2025-03-17 ASSESSMENT — LOCATION ZONE DERM
LOCATION ZONE: LEG
LOCATION ZONE: FACE
LOCATION ZONE: TRUNK

## 2025-03-17 NOTE — HPI: WOUND, LOWER EXTREMITY
Is The Wound New Or Recurrent?: new
How Severe Is It?: mild
How Is Your Wound Healing?: healing slowly
Is This A New Presentation, Or A Follow-Up?: Lower Extremity Wound
Additional History: No odor, pain, drainage, fever.

## 2025-05-11 PROBLEM — N39.0 ACUTE UTI: Status: RESOLVED | Noted: 2025-01-01 | Resolved: 2025-01-01

## 2025-05-11 PROBLEM — M79.89 SWELLING OF LOWER EXTREMITY: Status: ACTIVE | Noted: 2025-01-01

## 2025-05-11 PROBLEM — N39.0 ACUTE UTI: Status: ACTIVE | Noted: 2025-01-01

## 2025-05-11 PROBLEM — G93.41 ACUTE METABOLIC ENCEPHALOPATHY: Status: ACTIVE | Noted: 2025-01-01

## 2025-05-11 PROBLEM — Z71.89 ACP (ADVANCE CARE PLANNING): Status: ACTIVE | Noted: 2025-01-01

## 2025-05-12 NOTE — H&P
[facility] Medicine History & Physical Note    Date of Service  5/11/2025    Primary Care Physician  Wily Garcia M.D.    Consultants  None    Code Status  DNAR/DNI    Chief Complaint  Chief Complaint   Patient presents with    Urinary Frequency    Loss of Appetite       History of Presenting Illness  Jayshree Reinoso is a 74 y.o. female who presented 5/11/2025 with UTI.  Patient has a history of dementia.  History provided by  at bedside.  At baseline, patient is ambulatory and perform activities of daily living.  Regularly,  and patient play cards often.  Patient has a history of frequent UTIs.  For the last few days, patient has been noted to have decreased mentation, loss of appetite, urinary frequency.   suspected a UTI, and decided to bring her into the ER for further evaluation.    In ER, patient tachycardic.  Pertinent labs include white blood cell count 15,000 with left shift.  Lactic acid within normal limits.  UA positive for UTI.    I discussed the plan of care with patient.    Review of Systems  ROS    Past Medical History   has a past medical history of Alzheimer's disease (HCC), Alzheimer's disease (HCC), Anesthesia, Arthritis, Backpain, Cancer (HCC) (2010), Hypothyroid, Other specified symptom associated with female genital organs, Pain, Renal disorder, Unspecified disorder of thyroid, and Unspecified urinary incontinence.    Surgical History   has a past surgical history that includes scleral buckling (7/9/08); rectus repair (10/9/08); hernia repair; inguinal hernia repair (12/18/2008); laparoscopy (5/7/2009); femoral hernia repair (5/7/2009); fusion, spine, lumbar, plif (2/27/2013); lumbar laminectomy diskectomy (2/27/2013); gyn surgery; vaginectomy (7/25/2011); gyn surgery; gyn surgery; knee arthroplasty total (Right, 7/11/2016); gastroscopy-endo (8/11/2016); gastroscopy-endo (N/A, 7/26/2019); colonoscopy - endo (N/A, 7/27/2019); and pr colonoscopy-flexible (N/A, 11/5/2022).      Family History  family history is not on file.   Family history reviewed with patient. There is no family history that is pertinent to the chief complaint.     Social History   reports that she has never smoked. She has never used smokeless tobacco. She reports that she does not drink alcohol and does not use drugs.    Allergies  No Known Allergies    Medications  Prior to Admission Medications   Prescriptions Last Dose Informant Patient Reported? Taking?   B Complex Vitamins (VITAMIN B COMPLEX PO)  Significant Other Yes No   Sig: Take 1 Tablet by mouth every day.   Coenzyme Q10 (CO Q 10 PO)  Significant Other Yes No   Sig: Take 1 Capsule by mouth every day.   Melatonin 10 MG Tab  Significant Other Yes No   Sig: Take 10 mg by mouth every evening.   levothyroxine (SYNTHROID) 88 MCG Tab  Significant Other Yes No   Sig: Take 88 mcg by mouth every morning on an empty stomach.      Facility-Administered Medications: None       Physical Exam  Temp:  [36 °C (96.8 °F)] 36 °C (96.8 °F)  Pulse:  [107] 107  Resp:  [16] 16  BP: (124-148)/() 124/83  SpO2:  [92 %-93 %] 92 %  Blood Pressure : 124/83   Temperature: 36 °C (96.8 °F)   Pulse: (!) 107   Respiration: 16   Pulse Oximetry: 92 %       Physical Exam  Constitutional:       Comments: Unreliable historian, follows simple commands   HENT:      Head: Normocephalic.      Nose: Nose normal.      Mouth/Throat:      Mouth: Mucous membranes are moist.   Eyes:      Pupils: Pupils are equal, round, and reactive to light.   Cardiovascular:      Rate and Rhythm: Normal rate and regular rhythm.      Pulses: Normal pulses.   Pulmonary:      Effort: Pulmonary effort is normal.      Breath sounds: Normal breath sounds.   Abdominal:      General: Abdomen is flat. Bowel sounds are normal.      Palpations: Abdomen is soft.   Musculoskeletal:         General: Swelling present.      Cervical back: Neck supple.   Skin:     General: Skin is warm.   Neurological:      Mental Status: She is  "disoriented.         Laboratory:  Recent Labs     05/11/25 1908   WBC 15.9*   RBC 4.82   HEMOGLOBIN 14.5   HEMATOCRIT 43.9   MCV 91.1   MCH 30.1   MCHC 33.0   RDW 49.8   PLATELETCT 364   MPV 10.2     Recent Labs     05/11/25 1908   SODIUM 140   POTASSIUM 4.8   CHLORIDE 105   CO2 20   GLUCOSE 112*   BUN 16   CREATININE 0.89   CALCIUM 9.3     Recent Labs     05/11/25 1908   ALTSGPT 10   ASTSGOT 22   ALKPHOSPHAT 73   TBILIRUBIN 0.5   LIPASE 15   GLUCOSE 112*         No results for input(s): \"NTPROBNP\" in the last 72 hours.      No results for input(s): \"TROPONINT\" in the last 72 hours.    Imaging:  DX-CHEST-PORTABLE (1 VIEW)    (Results Pending)       no X-Ray or EKG requiring interpretation    Assessment/Plan:  Justification for Admission Status  I anticipate this patient will require at least two midnights for appropriate medical management, necessitating inpatient admission because pt has acute UTI    Patient will need a Med/Surg bed on EMERGENCY service .  The need is secondary to acute uti.    * Acute metabolic encephalopathy  Assessment & Plan  Presents for urinary frequency, decreased mentation, loss of appetite.  Positive UTI  Ancef  Consider MRI brain if symptoms do not resolve.   Fluids    Swelling of lower extremity  Assessment & Plan  Per , swelling is chronic.  Failed outpatient therapy with diuretics    ACP (advance care planning)  Assessment & Plan  Review with . DNR/DNI    Dementia (HCC)- (present on admission)  Assessment & Plan  History of    Hypothyroidism  Assessment & Plan  Synthroid        VTE prophylaxis: pharmacologic prophylaxis contraindicated due to none due to hx gib  "

## 2025-05-12 NOTE — DISCHARGE PLANNING
Care Transition Team Assessment    NOK is pt's spouse, Per, [phone]    RN CM met with pt's spouse, Per, at bedside for assessment. Pt lives with Per in a single story [place]. At baseline she has dementia and is able to perform some ADL's independently. She walks holding on to things, has a walker but won't use it. She dresses and feeds her self, spouse assists with showering and has a shower chair. He drives her. Their daughter is currently undergoing treatment for her own health issues. Patient has never been to SNF but has had home health before and Per open to that again if needed. She collects PERS and gets social security for income. They have no financial concerns. She sees Wily Garcia for PCP. Hope is that patient is able to discharge home with continued support from spouse.    Information Source  Orientation Level: Oriented to person, Disoriented to place, Disoriented to time, Disoriented to situation  Information Given By: Spouse  Informant's Name: Per  Who is responsible for making decisions for patient? : POA  Name(s) of Primary Decision Maker: Per Reinoso    Readmission Evaluation  Is this a readmission?: No    Elopement Risk  Legal Hold: No  Ambulatory or Self Mobile in Wheelchair: No-Not an Elopement Risk  Elopement Risk: Not at Risk for Elopement    Interdisciplinary Discharge Planning  Primary Care Physician: Wily Garcia  Lives with - Patient's Self Care Capacity: Spouse  Patient or legal guardian wants to designate a caregiver: Yes  Caregiver name: Per Reinoso  Caregiver contact info: [phone]  Support Systems: Spouse / Significant Other  Housing / Facility: 1 Story House  Mobility Issues: Yes  Prior Services: Intermittent Physical Support for ADL Per Family  Patient Prefers to be Discharged to:: home    Discharge Preparedness  What is your plan after discharge?: Home with help  What are your discharge supports?: Child, Spouse  Prior Functional Level:  Ambulatory, Needs Assist with Activities of Daily Living, Needs Assist with Medication Management  Difficulity with ADLs: Bathing  Difficulty with ADLs Comment: spouse assists  Difficulity with IADLs: Cooking, Driving, Keeping track of finances, Laundry, Managing medication, Shopping, Using the telephone or computer  Difficulity with IADL Comments: Spouse assists    Functional Assesment  Prior Functional Level: Ambulatory, Needs Assist with Activities of Daily Living, Needs Assist with Medication Management    Finances  Financial Barriers to Discharge: No  Prescription Coverage: Yes    Vision / Hearing Impairment  Vision Impairment : Yes  Right Eye Vision: Impaired, Wears Glasses  Left Eye Vision: Impaired, Wears Glasses  Hearing Impairment : No    Advance Directive  Advance Directive?: DPOA for Health Care, POLST  Durable Power of  Name and Contact : Per Reinoso [phone]    Domestic Abuse  Have you ever been the victim of abuse or violence?:  (VINCENT)  Possible Abuse/Neglect Reported to:: Not Applicable    Psychological Assessment  History of Substance Abuse: None    Discharge Risks or Barriers  Patient risk factors: Cognitive / sensory / physical deficit    Anticipated Discharge Information  Discharge Disposition: D/T to SNF with Medicare cert in anticipation of skilled care (03)

## 2025-05-12 NOTE — ASSESSMENT & PLAN NOTE
Presents for urinary frequency, decreased mentation, loss of appetite.  Positive UTI  Ancef  Fluids

## 2025-05-12 NOTE — ED TRIAGE NOTES
"Chief Complaint   Patient presents with    Urinary Frequency    Loss of Appetite     Patient wheeled to triage for above. Appropriate precautions in place.     Per , the patient has been having urinary frequency for the last week. Family also states for the last month the patient has had a decreased appetite. Patient shakes her head \"no\" when asked if she has any pain.  states patient has a history of dementia and is behaving at baseline. He reports no fevers or chills.  said she was diagnosed with a UTI approximately 1 year ago and had similar symptoms.    Explained wait time and triage process. Placed back in lobby. Told to notify ED tech or RN of any changes, verbalized understanding.    BP (!) 148/110   Pulse (!) 107   Temp 36 °C (96.8 °F) (Temporal)   Resp 16   Ht 1.626 m (5' 4\")   Wt 56.7 kg (125 lb)   SpO2 93%   BMI 21.46 kg/m²     "

## 2025-05-12 NOTE — ED NOTES
Medication Reconciliation    Medication reconciliation is complete per patient's  at bedside.  Allergies reviewed.  No outpatient antibiotics in the last 30 days.  No anticoagulants in the last 14 days.  Patient's home pharmacy is [facility] in [place] [phone].  Dispense history available in EPIC? No    Joleen Posey, Pharmacy Intern

## 2025-05-12 NOTE — CARE PLAN
The patient is Stable - Low risk of patient condition declining or worsening         Progress made toward(s) clinical / shift goals:  Pt A&Ox1, oriented to self. Hx of dementia and does not talk/answer questions. Denies any pain. Q2 turns with pillows implemented along with heel/elbow mepilex. Heels floated with pillows. Admit profile and 2 RN skin check complete with pictures uploaded. Bed in low locked position with call light within reach.       Problem: Fall Risk  Goal: Patient will remain free from falls  Description: Target End Date:  Prior to discharge or change in level of careDocument interventions on the [facility] Fall Risk Assessment1.  Assess for fall risk factors2.  Implement fall precautions  Outcome: Progressing     Problem: Skin Integrity  Goal: Skin integrity is maintained or improved  Description: Target End Date:  Prior to discharge or change in level of careDocument interventions on Skin Risk/Charles flowsheet groups and corresponding LDA1.  Assess and monitor skin integrity, appearance and/or temperature2.  Assess risk factors for impaired skin integrity and/or pressures ulcers3.  Implement precautions to protect skin integrity in collaboration with interdisciplinary team4.  Implement pressure ulcer prevention protocol if at risk for skin breakdown5.  Confirm wound care consult if at risk for skin breakdown6.  Ensure patient use of pressure relieving devices  (Low air loss bed, waffle overlay, heel protectors, ROHO cushion, etc)  Outcome: Progressing       Patient is not progressing towards the following goals:      Problem: Knowledge Deficit - Standard  Goal: Patient and family/care givers will demonstrate understanding of plan of care, disease process/condition, diagnostic tests and medications  Description: Target End Date:  1-3 days or as soon as patient condition allowsDocument in Patient Education1.  Patient and family/caregiver oriented to unit, equipment, visitation policy and means  for communicating concern2.  Complete/review Learning Assessment3.  Assess knowledge level of disease process/condition, treatment plan, diagnostic tests and medications4.  Explain disease process/condition, treatment plan, diagnostic tests and medications  5/12/2025 0229 by Maryuri Tan, R.N.  Outcome: Not Progressing  5/12/2025 0229 by Maryuri Tan, R.N.  Outcome: Not Progressing

## 2025-05-12 NOTE — PROGRESS NOTES
4 Eyes Skin Assessment Completed by JESUS Chery and JESUS Bull.    Head Dryness to forehead  Ears WDL  Nose Redness and Blanching to area where glasses rest. Glasses removed  Mouth WDL  Neck WDL  Breast/Chest Pink under breasts  Shoulder Blades WDL  Spine WDL  (R) Arm/Elbow/Hand Redness, Blanching, Bruising, and Scab  (L) Arm/Elbow/Hand Redness, Blanching, and Bruising  Abdomen WDL  Groin Redness  Scrotum/Coccyx/Buttocks Redness and Blanching  (R) Leg Scab and Edema  (L) Leg Scab and Edema  (R) Heel/Foot/Toe Edema, Redness and Blanching, callous to bottom of foot and top of middle toe  (L) Heel/Foot/Toe Edema, Redness and Blanching          Devices In Places Purewick, PIV      Interventions In Place Heel Mepilex, Heel Float Boots, TAP System, Pillows, Elbow Mepilex, Q2 Turns, and Barrier Cream, Low Airloss Pump    Possible Skin Injury No    Pictures Uploaded Into Epic Yes  Wound Consult Placed N/A  RN Wound Prevention Protocol Ordered Yes

## 2025-05-12 NOTE — PROGRESS NOTES
4 Eyes Skin Assessment Completed by JESUS Wahl and JESUS William.    Head Scab, dryness  Ears WDL  Nose Redness and Blanching  Mouth WDL  Neck WDL  Breast/Chest WDL  Shoulder Blades WDL  Spine WDL  (R) Arm/Elbow/Hand Redness, Blanching, Bruising, and Scab, dryness  (L) Arm/Elbow/Hand Redness, Blanching, Bruising, and Scab, dryness   Abdomen WDL  Groin Redness and Blanching  Scrotum/Coccyx/Buttocks Redness and Blanching  (R) Leg Scab, Bruising, and Swelling, dryness  (L) Leg Scab, Bruising, and Swelling, dryness   (R) Heel/Foot/Toe Redness, Blanching, Boggy, and Swelling  (L) Heel/Foot/Toe Redness, Blanching, Boggy, and Swelling          Devices In Places Blood Pressure Cuff      Interventions In Place Heel Mepilex, Pillows, and Elbow Mepilex    Possible Skin Injury Yes    Pictures Uploaded Into Epic Yes  Wound Consult Placed N/A, all spots blanching   RN Wound Prevention Protocol Ordered Yes

## 2025-05-12 NOTE — DISCHARGE PLANNING
Case Management Discharge Planning    Admission Date: 5/11/2025  GMLOS: 3.8  ALOS: 1        Anticipated Discharge Dispo: Discharge Disposition: D/T to SNF with Medicare cert in anticipation of skilled care (03)    DME Needed: No    Action(s) Taken: Chart reviewed. Per policy due to 6 clicks of 14 and 13, snf blanket sent. PT/OT have not been ordered, will discuss with MD. MUSE in manual review.    Escalations Completed: None    Medically Clear: No    Next Steps: F/U with treatment team and pt, family for discharge planning needs    Barriers to Discharge: Medical clearance    Is the patient up for discharge tomorrow: No

## 2025-05-12 NOTE — ED PROVIDER NOTES
ER Provider Note    Scribed for Fortunato Degroot M.d. by Candace Garay. 5/11/2025  6:17 PM    Primary Care Provider: Wily Garcia M.D.    CHIEF COMPLAINT   Chief Complaint   Patient presents with    Urinary Frequency    Loss of Appetite     EXTERNAL RECORDS REVIEWED  Outpatient Notes seen by primary care December 22, 2024.   For leg swelling    HPI/ROS  LIMITATION TO HISTORY   Select: Altered mental status / Confusion (Dementia)  OUTSIDE HISTORIAN(S):  Significant other at bedside who provided history of the patient's symptoms.    Jayshree Reinoso is a 74 y.o. female with a history of UTI's who presents to the ED complaining of urinary urgency onset one week ago. The patient's  states she has also had dysuria, loss of appetite, lower extremity edema, weakness, and loose stools but denies fever. He notes these symptoms are similar to her past UTI's. He adds she is seeing her urologist in eight days. Her  denies any history of CHF, and she does not take a water pill.     PAST MEDICAL HISTORY  Past Medical History:   Diagnosis Date    Alzheimer's disease (HCC)     Alzheimer's disease (HCC)     Anesthesia     PONV    Arthritis     RA and osteo    Backpain     Cancer (HCC) 2010    skin melanoma    Hypothyroid     Other specified symptom associated with female genital organs     vaginal mesh removed    Pain     bilateral knee    Renal disorder     renal fatigue    Unspecified disorder of thyroid     Unspecified urinary incontinence        SURGICAL HISTORY  Past Surgical History:   Procedure Laterality Date    VT COLONOSCOPY-FLEXIBLE N/A 11/5/2022    Procedure: COLONOSCOPY;  Surgeon: Charli Cortes M.D.;  Location: SURGERY [facility];  Service: Gastroenterology    COLONOSCOPY - ENDO N/A 7/27/2019    Procedure: COLONOSCOPY;  Surgeon: Guru Castañeda M.D.;  Location: ENDOSCOPY [facility];  Service: Gastroenterology    GASTROSCOPY-ENDO N/A 7/26/2019    Procedure: GASTROSCOPY;  Surgeon:  Guru Castañeda M.D.;  Location: ENDOSCOPY [facility];  Service: Gastroenterology    GASTROSCOPY-ENDO  8/11/2016    Procedure: GASTROSCOPY-ENDO;  Surgeon: Ruchi Akers M.D.;  Location: SURGERY [facility];  Service:     KNEE ARTHROPLASTY TOTAL Right 7/11/2016    Procedure: KNEE ARTHROPLASTY TOTAL;  Surgeon: Rayray Valentine M.D.;  Location: SURGERY [facility];  Service:     FUSION, SPINE, LUMBAR, PLIF  2/27/2013    Performed by Sonny Flores M.D. at SURGERY [facility]    LUMBAR LAMINECTOMY DISKECTOMY  2/27/2013    Performed by Sonny Flores M.D. at SURGERY [facility]    VAGINECTOMY  7/25/2011    Performed by MICHELLE BURT at SURGERY SAME DAY [facility] ORS    LAPAROSCOPY  5/7/2009    Performed by RODY SAENZ at SURGERY [facility]    FEMORAL HERNIA REPAIR  5/7/2009    Performed by RODY SAENZ at SURGERY [facility] ORS    INGUINAL HERNIA REPAIR  12/18/2008    [facility]    RECTUS REPAIR  10/9/08    Performed by EFREN REYNOLDS [facility]    SCLERAL BUCKLING  7/9/08    Performed by JORGE DAVENPORT [facility]    GYN SURGERY      GYN SURGERY      vaginal mesh removed    GYN SURGERY      hysterectomy    HERNIA REPAIR         FAMILY HISTORY  None noted     SOCIAL HISTORY   reports that she has never smoked. She has never used smokeless tobacco. She reports that she does not drink alcohol and does not use drugs.    CURRENT MEDICATIONS  Previous Medications    B COMPLEX VITAMINS (VITAMIN B COMPLEX PO)    Take 1 Tablet by mouth every day.    COENZYME Q10 (CO Q 10 PO)    Take 1 Capsule by mouth every day.    LEVOTHYROXINE (SYNTHROID) 88 MCG TAB    Take 88 mcg by mouth every morning on an empty stomach.    MELATONIN 10 MG TAB    Take 10 mg by mouth every evening.       ALLERGIES  Patient has no known allergies.    PHYSICAL EXAM  BP (!) 148/110   Pulse (!) 107   Temp 36 °C  "(96.8 °F) (Temporal)   Resp 16   Ht 1.626 m (5' 4\")   Wt 56.7 kg (125 lb)   SpO2 93%   BMI 21.46 kg/m²   Constitutional: Well developed, Well nourished, Mild distress.   HENT: Normocephalic, Atraumatic, Dry mucous membranes, No oral exudates.   Eyes: Conjunctiva normal, No discharge.   Neck: Supple, No stridor.   Cardiovascular: Normal heart rate, Normal rhythm, No murmurs, equal pulses.   Pulmonary: Normal breath sounds, No respiratory distress, No wheezing, No rales, No rhonchi.  Chest: No chest wall tenderness or deformity.   Abdomen:Soft, Suprapubic tenderness, No masses, no rebound, no guarding.   Back: Bilateral CVA tenderness.   Musculoskeletal: No major deformities noted, No tenderness.   Skin: Warm, Dry, No erythema, No rash.   Neurologic: Alert & oriented x 3, Normal motor function,  No focal deficits noted.   Psychiatric: Affect normal, Judgment normal, Mood normal.      DIAGNOSTIC STUDIES    LABS  Results for orders placed or performed during the hospital encounter of 05/11/25   Urinalysis, Culture if Indicated    Collection Time: 05/11/25  6:58 PM    Specimen: Urine, Clean Catch   Result Value Ref Range    Color Yellow     Character Turbid (A)     Specific Gravity 1.023 <1.035    Ph 5.5 5.0 - 8.0    Glucose Negative Negative mg/dL    Ketones >=160 (A) Negative mg/dL    Protein 100 (A) Negative mg/dL    Bilirubin Negative Negative    Urobilinogen, Urine 1.0 <=1.0 EU/dL    Nitrite Negative Negative    Leukocyte Esterase Large (A) Negative    Occult Blood Large (A) Negative    Micro Urine Req Microscopic    URINE MICROSCOPIC (W/UA)    Collection Time: 05/11/25  6:58 PM   Result Value Ref Range    WBC  (A) /hpf    RBC 21-50 (A) 0 - 2 /hpf    Bacteria Rare (A) None /hpf    Epithelial Cells 0-2 0 - 5 /hpf    Urine Casts 3-5 (A) 0 - 2 /lpf    Hyaline Cast Present /lpf   CBC WITH DIFFERENTIAL    Collection Time: 05/11/25  7:08 PM   Result Value Ref Range    WBC 15.9 (H) 4.8 - 10.8 K/uL    RBC 4.82 " 4.20 - 5.40 M/uL    Hemoglobin 14.5 12.0 - 16.0 g/dL    Hematocrit 43.9 37.0 - 47.0 %    MCV 91.1 81.4 - 97.8 fL    MCH 30.1 27.0 - 33.0 pg    MCHC 33.0 32.2 - 35.5 g/dL    RDW 49.8 35.9 - 50.0 fL    Platelet Count 364 164 - 446 K/uL    MPV 10.2 9.0 - 12.9 fL    Neutrophils-Polys 87.10 (H) 44.00 - 72.00 %    Lymphocytes 5.90 (L) 22.00 - 41.00 %    Monocytes 6.30 0.00 - 13.40 %    Eosinophils 0.00 0.00 - 6.90 %    Basophils 0.30 0.00 - 1.80 %    Immature Granulocytes 0.40 0.00 - 0.90 %    Nucleated RBC 0.00 0.00 - 0.20 /100 WBC    Neutrophils (Absolute) 13.81 (H) 1.82 - 7.42 K/uL    Lymphs (Absolute) 0.94 (L) 1.00 - 4.80 K/uL    Monos (Absolute) 1.00 (H) 0.00 - 0.85 K/uL    Eos (Absolute) 0.00 0.00 - 0.51 K/uL    Baso (Absolute) 0.04 0.00 - 0.12 K/uL    Immature Granulocytes (abs) 0.07 0.00 - 0.11 K/uL    NRBC (Absolute) 0.00 K/uL   COMP METABOLIC PANEL    Collection Time: 05/11/25  7:08 PM   Result Value Ref Range    Sodium 140 135 - 145 mmol/L    Potassium 4.8 3.6 - 5.5 mmol/L    Chloride 105 96 - 112 mmol/L    Co2 20 20 - 33 mmol/L    Anion Gap 15.0 7.0 - 16.0    Glucose 112 (H) 65 - 99 mg/dL    Bun 16 8 - 22 mg/dL    Creatinine 0.89 0.50 - 1.40 mg/dL    Calcium 9.3 8.5 - 10.5 mg/dL    Correct Calcium 9.5 8.5 - 10.5 mg/dL    AST(SGOT) 22 12 - 45 U/L    ALT(SGPT) 10 2 - 50 U/L    Alkaline Phosphatase 73 30 - 99 U/L    Total Bilirubin 0.5 0.1 - 1.5 mg/dL    Albumin 3.8 3.2 - 4.9 g/dL    Total Protein 8.1 6.0 - 8.2 g/dL    Globulin 4.3 (H) 1.9 - 3.5 g/dL    A-G Ratio 0.9 g/dL   LIPASE    Collection Time: 05/11/25  7:08 PM   Result Value Ref Range    Lipase 15 11 - 82 U/L   LACTIC ACID    Collection Time: 05/11/25  7:08 PM   Result Value Ref Range    Lactic Acid 1.4 0.5 - 2.0 mmol/L   ESTIMATED GFR    Collection Time: 05/11/25  7:08 PM   Result Value Ref Range    GFR (CKD-EPI) 68 >60 mL/min/1.73 m 2   URINE CULTURE(NEW)    Collection Time: 05/11/25  7:42 PM    Specimen: Urine   Result Value Ref Range    Significant  Indicator NEG     Source UR     Site -     Culture Result -       RADIOLOGY  The attending emergency physician has independently interpreted the diagnostic imaging associated with this visit and am waiting the final reading from the radiologist.   My preliminary interpretation is a follows: Chest x-ray does not show any pneumonia    Radiologist interpretation:  DX-CHEST-PORTABLE (1 VIEW)   Final Result         1. No acute cardiopulmonary abnormalities are identified.          COURSE & MEDICAL DECISION MAKING     ASSESSMENT, COURSE AND PLAN  Care Narrative:   Sepsis: Infection was suspected 8:00 PM (Time). Sepsis pathway was initiated. 30cc/kg bolus given. Antibiotics were given per protocol.   Hydration: Based on the patient's presentation of Dehydration the patient was given IV fluids. IV Hydration was used because oral hydration was not adequate alone. Upon recheck following hydration, the patient was improved.    6:17 PM - Patient seen and examined at bedside. She presents with urinary frequency, dysuria, weakness, loss of appetite, and lower extremity edema with a history of frequent UTI's. Patient has suprapubic tenderness and bilateral CVA tenderness on exam. Discussed plan of care, including labs to evaluate. Patient agrees to the plan of care. Ordered for UA w/ culture, lactic acid, lipase, CMP, and CBC w/ diff to evaluate her symptoms. Differential diagnoses include but not limited to: UTI, sepsis.     8:00 PM - Sepsis care path was initiated at this time. Ordered for blood culture x2 per protocol and DX-Chest. The patient will be medicated Ancef 2 g injection.     8:10 PM - I discussed the patient's case and the above findings with Dr. Mauro (Hospitalist) who agrees to evaluate the patient for hospitalization.         PROBLEM LIST  #1 UTI at this point time I think the patient's increased confusion and decreased appetite is secondary to UTI she does meet sepsis criteria therefore I think she would benefit  from admission for IV antibiotics and careful monitoring.  Patient's lactic acid is normal.  She does not need more than the 1000 mL of fluid she is already received.  Was given Ancef for UTI   DISPOSITION AND DISCUSSIONS  I have discussed management of the patient with the following physicians and CONRADO's:  Dr. Mauro (Hospitalist)    Discussion of management with other hospitals or appropriate source(s): None   Barriers to care at this time, including but not limited to:  None .     Decision tools and prescription drugs considered including, but not limited to: Antibiotics given Ancef .    DISPOSITION:  Patient will be hospitalized by Dr. Mauro in guarded condition.     FINAL DIANGOSIS  1. Acute UTI    2. Sepsis without acute organ dysfunction, due to unspecified organism (HCC)       ICandace (Scribe), am scribing for, and in the presence of, GABRIEL Lao*.    Electronically signed by: Candace Garay (Scribe), 5/11/2025    IFortunato M.* personally performed the services described in this documentation, as scribed by Candace Garay in my presence, and it is both accurate and complete.      The note accurately reflects work and decisions made by me.  Fortunato Degroot M.D.  5/11/2025  9:27 PM

## 2025-05-12 NOTE — CARE PLAN
The patient is Stable - Low risk of patient condition declining or worsening    Shift Goals  Clinical Goals: maintain skin integrity throughout shift  Patient Goals: VINCENT  Family Goals: Healing    Progress made toward(s) clinical / shift goals:  Pt alert, oriented to self. Denies pain, no signs of distress observed. Medications administered per MAR. Repositioned throughout shift, BRUCE pump attached to bed, heel floats boots placed on patient. Free of falls, precautions in place. Family at bedside.    Patient is not progressing towards the following goals:

## 2025-05-12 NOTE — ASSESSMENT & PLAN NOTE
Presents for urinary frequency, decreased mentation, loss of appetite.  Positive UTI  Ancef  Consider MRI brain if symptoms do not resolve.   Fluids

## 2025-05-13 NOTE — CARE PLAN
The patient is Stable - Low risk of patient condition declining or worsening      Problem: Knowledge Deficit - Standard  Goal: Patient and family/care givers will demonstrate understanding of plan of care, disease process/condition, diagnostic tests and medications  Outcome: Progressing     Problem: Fluid Volume  Goal: Fluid volume balance will be maintained  Outcome: Progressing     Problem: Skin Integrity  Goal: Skin integrity is maintained or improved  Outcome: Progressing     Problem: Fall Risk  Goal: Patient will remain free from falls  Outcome: Progressing     Shift Goals  Clinical Goals: Maintain skin integrity, Q2 turns, Rest and comfort  Patient Goals: VINCENT  Family Goals: VINCENT    Progress made toward(s) clinical / shift goals:      Pt alert and oriented to self only. VSS. On room air. On continuous NS 83 ml/hr. No complaints or signs of pain. Seen by PT today, able to walk up to hallway with 1 person assist, hand held, reinforced education on fall precaution, call light within reach, bed alarm on.  at bedside updated to POC.

## 2025-05-13 NOTE — PROGRESS NOTES
Pt alert and oriented to self only. BP elevated this afternoon, hospitalist informed, no intervention at this time, order to check BP daily to monitor BP and to f/u with PCP.  at bedside, updated to POC.

## 2025-05-13 NOTE — THERAPY
Physical Therapy   Initial Evaluation     Patient Name: Jayshree Reinoso  Age:  74 y.o., Sex:  female  Medical Record #: [id number]  Today's Date: 5/13/2025     Precautions  Precautions: Fall Risk  Comments: hx dementia    Assessment  Patient is 74 y.o. female admitted due to decreased mentation, loss of appetite, urinary frequency per spouse's report. Pt found to have UTI. PMH includes dementia, urinary incontinence.     Patient received in bed and agreeable to PT session. Pt's spouse present and supportive, able to provide all prior level information. Pt able to mobilize grossly with CGA and no AD however requesting to hold onto her spouse's arm. Per pt's spouse, the pt is normally independent with mobility however in unfamiliar environments pt prefers to hold onto him. Pt able to ambulate a total of 150ft, no overt LOB. Pt's spouse reporting pt mobilizing near baseline given the unfamiliar environment. Anticipate pt will be able to return home with HHPT and support from spouse. Will follow for acute care PT needs.    Plan    Physical Therapy Initial Treatment Plan   Treatment Plan : (P) Bed Mobility, Gait Training, Neuro Re-Education / Balance, Self Care / Home Evaluation, Therapeutic Activities, Therapeutic Exercise  Treatment Frequency: (P) 3 Times per Week  Duration: (P) Until Therapy Goals Met    DC Equipment Recommendations: (P) None  Discharge Recommendations: (P) Recommend home health for continued physical therapy services        Objective       05/13/25 1126   Initial Contact Note    Initial Contact Note Order Received and Verified, Physical Therapy Evaluation in Progress with Full Report to Follow.   Precautions   Precautions Fall Risk   Comments hx dementia   Vitals   O2 (LPM) 0   O2 Delivery Device None - Room Air   Vitals Comments VSS, declines dizziness   Pain 0 - 10 Group   Therapist Pain Assessment Post Activity Pain Same as Prior to Activity;Nurse Notified  (pain not rated)   Prior Living Situation    Prior Services Intermittent Physical Support for ADL Per Family   Housing / Facility 1 Story House   Equipment Owned Front-Wheel Walker  (per pt's spouse, pt refuses to use FWW)   Lives with - Patient's Self Care Capacity Spouse   Comments Pt's spouse present and supportive, able to provide prior living and PLOF as pt is an unreliable historian   Prior Level of Functional Mobility   Bed Mobility Independent   Transfer Status Independent   Ambulation Independent   Ambulation Distance limited community   Assistive Devices Used None   Comments Pt primarily ambulates household distances, occasionally goes out to restaurants. Per spouse, pt typically holds onto his arm when ambulating in unfamiliar environments   History of Falls   History of Falls No   Cognition    Cognition / Consciousness X   Speech/ Communication Delayed Responses   Orientation Level Not Oriented to Reason;Not Oriented to Place;Not Oriented to Time;Not Oriented to Day;Not Oriented to Month;Not Oriented to Year   Level of Consciousness Alert   Ability To Follow Commands 1 Step   Safety Awareness Impaired;Impulsive   New Learning Impaired   Attention Impaired   Sequencing Impaired   Initiation Impaired   Comments baseline dementia. Not verbally responding to PT, would only speak to her spouse.   Active ROM Lower Body    Active ROM Lower Body  WDL   Strength Lower Body   Lower Body Strength  X   Gross Strength Generalized Weakness, Equal Bilaterally   Comments functional for short distance mobility   Balance Assessment   Sitting Balance (Static) Fair   Sitting Balance (Dynamic) Fair   Standing Balance (Static) Fair -   Standing Balance (Dynamic) Fair -   Weight Shift Sitting Fair   Weight Shift Standing Fair   Comments w/HHA   Bed Mobility    Supine to Sit Contact Guard Assist   Sit to Supine Minimal Assist   Scooting Contact Guard Assist   Comments HOB slightly elevated   Gait Analysis   Gait Level Of Assist Contact Guard Assist   Assistive Device  Hand Held Assist   Distance (Feet) 150   # of Times Distance was Traveled 1   Deviation Decreased Base Of Support;Bradykinetic   Comments pt holding on to spouse per spouses request, PT provided CGA for extra support and safety   Functional Mobility   Sit to Stand Contact Guard Assist   Bed, Chair, Wheelchair Transfer Contact Guard Assist   Mobility bed <> ambulate   6 Clicks Assessment - How much HELP from from another person do you currently need... (If the patient hasn't done an activity recently, how much help from another person do you think he/she would need if he/she tried?)   Turning from your back to your side while in a flat bed without using bedrails? 3   Moving from lying on your back to sitting on the side of a flat bed without using bedrails? 3   Moving to and from a bed to a chair (including a wheelchair)? 3   Standing up from a chair using your arms (e.g., wheelchair, or bedside chair)? 3   Walking in hospital room? 3   Climbing 3-5 steps with a railing? 2   6 clicks Mobility Score 17   Short Term Goals    Short Term Goal # 1 Pt will be able to perform supine <> sit with SPV in 6 visits to progress bed mobility   Short Term Goal # 2 Pt will be able to perform STS with LRAD and SPV in 6 visits to progress functional transfers   Short Term Goal # 3 Pt will be able to ambulate 150ft with LRAD and SPV in 6 visits to progress functional gait   Education Group   Education Provided Role of Physical Therapist   Role of Physical Therapist Patient Response Patient;Significant Other;Acceptance;Explanation;Verbal Demonstration   Physical Therapy Initial Treatment Plan    Treatment Plan  Bed Mobility;Gait Training;Neuro Re-Education / Balance;Self Care / Home Evaluation;Therapeutic Activities;Therapeutic Exercise   Treatment Frequency 3 Times per Week   Duration Until Therapy Goals Met   Problem List    Problems Impaired Bed Mobility;Impaired Ambulation;Functional Strength Deficit;Impaired Balance;Decreased  Activity Tolerance;Safety Awareness Deficits / Cognition   Anticipated Discharge Equipment and Recommendations   DC Equipment Recommendations None   Discharge Recommendations Recommend home health for continued physical therapy services   Interdisciplinary Plan of Care Collaboration   IDT Collaboration with  Nursing;Family / Caregiver   Patient Position at End of Therapy In Bed;Bed Alarm On;Call Light within Reach;Tray Table within Reach;Phone within Reach;Family / Friend in Room   Collaboration Comments RN updated   Session Information   Date / Session Number  5/13 - 1 (1/3, 5/19)

## 2025-05-13 NOTE — PROGRESS NOTES
"Hospital Medicine Daily Progress Note    Date of Service  5/12/2025    Chief Complaint  Jayshree Reinoso is a 74 y.o. female admitted 5/11/2025 with altered mentation    Hospital Course  From H&P: \"Jayshree Reinoso is a 74 y.o. female who presented 5/11/2025 with UTI.  Patient has a history of dementia.  History provided by  at bedside.  At baseline, patient is ambulatory and perform activities of daily living.  Regularly,  and patient play cards often.  Patient has a history of frequent UTIs.  For the last few days, patient has been noted to have decreased mentation, loss of appetite, urinary frequency.   suspected a UTI, and decided to bring her into the ER for further evaluation.   In ER, patient tachycardic.  Pertinent labs include white blood cell count 15,000 with left shift.  Lactic acid within normal limits.  UA positive for UTI.\"    Interval Problem Update  5/12  Afebrile pulse 90s normal respiratory rate systolic blood pressure 140s to 170s pulse ox 90 to 94% on room air.  Leukocytosis decreasing down to 13.4, bicarb 19.  Blood and urine cultures in process.  Continue antibiotics.  Discussed with patient and  at bedside, no new complaints.    I have discussed this patient's plan of care and discharge plan at IDT rounds today with Case Management, Nursing, Nursing leadership, and other members of the IDT team.    Consultants/Specialty  NA    Code Status  DNAR/DNI    Disposition  The patient is not medically cleared for discharge to home or a post-acute facility.      I have placed the appropriate orders for post-discharge needs.    Review of Systems  Review of Systems   Constitutional:  Positive for malaise/fatigue.   Neurological:  Positive for weakness.   Psychiatric/Behavioral:          Confusion        Physical Exam  Temp:  [36.3 °C (97.4 °F)-36.9 °C (98.4 °F)] 36.7 °C (98 °F)  Pulse:  [86-92] 91  Resp:  [18] 18  BP: (124-173)/() 145/89  SpO2:  [90 %-94 %] 94 %    Physical " Exam  Vitals and nursing note reviewed. Exam conducted with a chaperone present.   Constitutional:       General: She is not in acute distress.     Appearance: She is ill-appearing (chronically). She is not toxic-appearing.   HENT:      Head: Normocephalic and atraumatic.      Nose: Nose normal. No rhinorrhea.      Mouth/Throat:      Mouth: Mucous membranes are dry.      Pharynx: Oropharynx is clear.   Eyes:      General: No scleral icterus.     Extraocular Movements: Extraocular movements intact.      Conjunctiva/sclera: Conjunctivae normal.   Cardiovascular:      Rate and Rhythm: Normal rate and regular rhythm.      Pulses: Normal pulses.   Pulmonary:      Effort: Pulmonary effort is normal. No respiratory distress.      Breath sounds: Normal breath sounds. No wheezing, rhonchi or rales.   Abdominal:      General: There is no distension.      Palpations: Abdomen is soft.      Tenderness: There is no abdominal tenderness. There is no guarding or rebound.   Musculoskeletal:         General: Normal range of motion.      Cervical back: Normal range of motion and neck supple. No rigidity.   Skin:     General: Skin is warm and dry.      Capillary Refill: Capillary refill takes less than 2 seconds.   Neurological:      General: No focal deficit present.      Mental Status: She is alert. She is disoriented.      Cranial Nerves: No cranial nerve deficit.   Psychiatric:         Mood and Affect: Mood normal.         Behavior: Behavior normal.         Thought Content: Thought content normal.         Judgment: Judgment normal.         Fluids  No intake or output data in the 24 hours ending 05/12/25 1811     Laboratory  Recent Labs     05/11/25 1908 05/12/25  0115   WBC 15.9* 13.4*   RBC 4.82 4.62   HEMOGLOBIN 14.5 13.5   HEMATOCRIT 43.9 42.2   MCV 91.1 91.3   MCH 30.1 29.2   MCHC 33.0 32.0*   RDW 49.8 49.9   PLATELETCT 364 312   MPV 10.2 10.7     Recent Labs     05/11/25  1908 05/12/25  0115   SODIUM 140 140   POTASSIUM 4.8  4.1   CHLORIDE 105 108   CO2 20 19*   GLUCOSE 112* 84   BUN 16 13   CREATININE 0.89 0.64   CALCIUM 9.3 8.5                   Imaging  DX-CHEST-PORTABLE (1 VIEW)   Final Result         1. No acute cardiopulmonary abnormalities are identified.           Assessment/Plan  * Acute metabolic encephalopathy  Assessment & Plan  Presents for urinary frequency, decreased mentation, loss of appetite.  Positive UTI  Ancef  Consider MRI brain if symptoms do not resolve.   Fluids    Swelling of lower extremity  Assessment & Plan  Per , swelling is chronic.  Failed outpatient therapy with diuretics    ACP (advance care planning)  Assessment & Plan  Review with . DNR/DNI    Dementia (HCC)- (present on admission)  Assessment & Plan  History of    Hypothyroidism  Assessment & Plan  Synthroid         VTE prophylaxis:    Xarelto 10mg daily as prophylaxis      I have performed a physical exam and reviewed and updated ROS and Plan today (5/12/2025). In review of yesterday's note (5/11/2025), there are no changes except as documented above.  Total time spent 53 minutes. I spent greater than 50% of the time for patient care, counseling, and coordination on this date, including unit/floor time, and face-to-face time with the patient as per interval events, my own review of patient's imaging and lab analysis and developing my assessment and plan above.

## 2025-05-13 NOTE — CARE PLAN
The patient is Stable - Low risk of patient condition declining or worsening    Shift Goals  Clinical Goals: urine culture, maintain skin integrity during shift  Patient Goals: VINCENT  Family Goals: VINCENT    Progress made toward(s) clinical / shift goals:  Pt A&Ox1, oriented only to self. Denies pain. Preventative measures in place for skin breakdown. Skin less red this shift. Bed in low locked position with call light within reach.       Problem: Skin Integrity  Goal: Skin integrity is maintained or improved  Description: Target End Date:  Prior to discharge or change in level of careDocument interventions on Skin Risk/Charles flowsheet groups and corresponding LDA1.  Assess and monitor skin integrity, appearance and/or temperature2.  Assess risk factors for impaired skin integrity and/or pressures ulcers3.  Implement precautions to protect skin integrity in collaboration with interdisciplinary team4.  Implement pressure ulcer prevention protocol if at risk for skin breakdown5.  Confirm wound care consult if at risk for skin breakdown6.  Ensure patient use of pressure relieving devices  (Low air loss bed, waffle overlay, heel protectors, ROHO cushion, etc)  Outcome: Progressing     Problem: Fall Risk  Goal: Patient will remain free from falls  Description: Target End Date:  Prior to discharge or change in level of careDocument interventions on the Myesha Altamirano Fall Risk Assessment1.  Assess for fall risk factors2.  Implement fall precautions  Outcome: Progressing       Patient is not progressing towards the following goals:

## 2025-05-13 NOTE — PROGRESS NOTES
4 Eyes Skin Assessment Completed by JESUS Wahl and JESUS Rogers.    Head WDL  Ears WDL  Nose Redness and Blanching  Mouth WDL  Neck WDL  Breast/Chest dryness  Shoulder Blades WDL  Spine WDL  (R) Arm/Elbow/Hand Redness, Blanching, Bruising, and Scab  (L) Arm/Elbow/Hand Redness, Blanching, and Bruising  Abdomen WDL  Groin Redness and Blanching  Scrotum/Coccyx/Buttocks Redness and Blanching  (R) Leg Scab, Bruising, and Swelling  (L) Leg Scab, Bruising, and Swelling  (R) Heel/Foot/Toe Redness, Blanching, and Swelling  (L) Heel/Foot/Toe Redness, Blanching, and Swelling          Devices In Places Blood Pressure Cuff, PIV/IV fluids      Interventions In Place Heel Mepilex, Heel Float Boots, TAP System, Pillows, Elbow Mepilex, Q2 Turns, Low Air Loss Mattress, and Barrier Cream    Possible Skin Injury No    Pictures Uploaded Into Epic Yes  Wound Consult Placed N/A  RN Wound Prevention Protocol Ordered Yes

## 2025-05-13 NOTE — DISCHARGE PLANNING
Case Management Discharge Planning    Admission Date: 5/11/2025  GMLOS: 3.8  ALOS: 2        Anticipated Discharge Dispo: Discharge Disposition: D/T to home under HHA care in anticipation of covered skilled care (06)    DME Needed: No    Action(s) Taken: Chart reviewed, pt discussed in IDT rounds. Per RN, PT recommending home health, MD to order. RN FLAVIO met with pt's spouse and he signed choice for [facility]-1 as they have had them previously, and [facility] -2, faxed to [facility]. Pt likely cleared for discharge home today.    Escalations Completed: None    Medically Clear: Yes    Next Steps: F/U for home health order to send referral    Barriers to Discharge: home health referral sent after order placed    Is the patient up for discharge tomorrow: No

## 2025-05-13 NOTE — DISCHARGE SUMMARY
Discharge Summary    CHIEF COMPLAINT ON ADMISSION  Chief Complaint   Patient presents with    Urinary Frequency    Loss of Appetite       Reason for Admission  loss of appetite x 7 days     Admission Date  5/11/2025    CODE STATUS  DNAR/DNI    HPI & HOSPITAL COURSE  This is a 74 y.o. female here with PMH of dementia, hypothyroidism, recurrent UTIs presented here on 5/11/2025 with altered mental status.  History was provided by her  who is her primary caretaker.  At baseline patient is ambulatory and performs activities of daily living and is oriented x 2-3.  She does have a history of recurrent UTIs and gets confused during those episodes.  As per patient's  patient was noted to be lethargic, confused, decreased appetite and increased urinary frequency for last few days and therefore he suspected UTI and decided to bring her to ER for further evaluation.  On presentation to ER she was found to be tachycardic, WBC of 15,000, UA with evidence of UTI.  She was started on IV cefazolin.  Her blood cultures have remained negative so far.  Urine cultures were not sent.  Her previous urine cultures have been significant for pansensitive E. coli.  Patient responded well to IV cefazolin.  Her mentation has improved almost back to her baseline as per  at bedside.  She worked with PT/OT today and was able to walk through the hallway although with some assistance and PT/OT recommended home with home health.  Due to her recurrent UTIs she follows outpatient with urology and has an appointment on 5/19.  Patient and her  were educated about return precautions to the ER and voiced understanding.  They were advised to keep the appointment on 5/19 with urology and discuss long-term antibiotics in view of frequent UTIs requiring hospitalizations.  They are agreeable with the plan.    Therefore, she is discharged in fair and stable condition to home with organized home healthcare and close outpatient  follow-up.    The patient met 2-midnight criteria for an inpatient stay at the time of discharge.    Discharge Date  5/13/2025    FOLLOW UP ITEMS POST DISCHARGE  Follow-up with urology on 5/19/2025  Follow-up with PCP    DISCHARGE DIAGNOSES  Principal Problem:    Acute metabolic encephalopathy (POA: Unknown)  Active Problems:    Hypothyroidism (POA: Unknown)    Dementia (HCC) (POA: Yes)    ACP (advance care planning) (POA: Unknown)    Swelling of lower extremity (POA: Unknown)  Resolved Problems:    Acute UTI (POA: Yes)      FOLLOW UP  No future appointments.  No follow-up provider specified.    MEDICATIONS ON DISCHARGE     Medication List        START taking these medications        Instructions   amoxicillin-clavulanate 875-125 MG Tabs  Commonly known as: Augmentin   Take 1 Tablet by mouth 2 times a day for 7 days.  Dose: 1 Tablet            CONTINUE taking these medications        Instructions   CO Q 10 PO   Take 1 Capsule by mouth every day.  Dose: 1 Capsule     levothyroxine 88 MCG Tabs  Commonly known as: Synthroid   Take 88 mcg by mouth every morning on an empty stomach.  Dose: 88 mcg     Melatonin 5 MG Caps   Take 5 mg by mouth every evening.  Dose: 5 mg     VITAMIN B COMPLEX PO   Take 1 Tablet by mouth every day.  Dose: 1 Tablet              Allergies  No Known Allergies    DIET  Orders Placed This Encounter   Procedures    Diet Order Diet: Level 7 - Easy to Chew; Liquid level: Level 1 - Slightly Thick ([facility] Only)     Standing Status:   Standing     Number of Occurrences:   1     Diet::   Level 7 - Easy to Chew [22]     Liquid level:   Level 1 - Slightly Thick ([facility] Only)       ACTIVITY  As tolerated and directed by skilled nursing.  Weight bearing as tolerated    CONSULTATIONS  None    PROCEDURES  None    LABORATORY  Lab Results   Component Value Date    SODIUM 138 05/13/2025    POTASSIUM 4.2 05/13/2025    CHLORIDE 106 05/13/2025    CO2 20 05/13/2025    GLUCOSE 78 05/13/2025    BUN 8 05/13/2025     CREATININE 0.50 05/13/2025    CREATININE 0.8 04/24/2009        Lab Results   Component Value Date    WBC 11.5 (H) 05/13/2025    HEMOGLOBIN 12.6 05/13/2025    HEMATOCRIT 40.2 05/13/2025    PLATELETCT 311 05/13/2025        Total time of the discharge process exceeds 30 minutes.

## 2025-05-13 NOTE — DISCHARGE PLANNING
Received Choice form at [phone]  Agency/Facility Name: [facility]  Referral sent per Choice form @ 1337

## 2025-05-13 NOTE — PROGRESS NOTES
4 Eyes Skin Assessment Completed by JESUS Gaines and JESUS Hammer.    Head WDL  Ears WDL  Nose Redness and Blanching dent from glasses  Mouth WDL  Neck WDL  Breast/Chest WDL  Shoulder Blades WDL  Spine WDL  (R) Arm/Elbow/Hand Redness, Blanching, Bruising, and Scab  (L) Arm/Elbow/Hand Redness, Blanching, Bruising, and Scab  Abdomen WDL  Groin Redness and Blanching  Scrotum/Coccyx/Buttocks Redness and Blanching  (R) Leg Swelling  (L) Leg Swelling  (R) Heel/Foot/Toe Redness and Blanching R ankle, calloused, dry  (L) Heel/Foot/Toe Redness and Blanching, dry                Devices In Places [place]  Interventions In Place Heel Mepilex, Heel Float Boots, TAP System, Pillows, Elbow Mepilex, Q2 Turns, Low Air Loss Mattress, and Barrier Cream  Possible Skin Injury No  Pictures Uploaded Into Epic Yes  Wound Consult Placed N/A  RN Wound Prevention Protocol Ordered No

## 2025-05-15 NOTE — PROGRESS NOTES
"Subjective:   Jayshree Reinoso is a 74 y.o. female who presents for UTI (Was in [facility] for bad uti, was there for 2 days. But today pt started having right side pain going to the back.)      HPI  The patient is a 74-year-old female who presents to the urgent care with her  with a few concerns.  Reports of right right sided back pain.  Patient was recently admitted to the hospital for a UTI and was placed on Augmentin.  She has been taking her antibiotics.  Was discharged yesterday.  She seems to be a lot better than she was from hospitalization, however still overall generally weak.  Slow to respond to questions.  Not completely at baseline.  Noticed a small tinge of red blood after urination on the toilet paper.  He does state that the patient has been tolerating fluids well such as soup.  Does have a history of dementia.  History of diverticulitis.  Not on blood thinners.  Patient locates the pain to her right flank area.  No vomiting or diarrhea.        Past Medical History[1]  Allergies[2]     Objective:     BP 98/60 (BP Location: Right arm, Patient Position: Sitting, BP Cuff Size: Small adult)   Pulse 95   Temp 36.4 °C (97.6 °F)   Resp 16   Ht 1.575 m (5' 2\")   Wt 48 kg (105 lb 12.8 oz)   SpO2 95%   BMI 19.35 kg/m²     Physical Exam  Vitals reviewed.   Constitutional:       Appearance: She is ill-appearing. She is not toxic-appearing.   HENT:      Mouth/Throat:      Mouth: Mucous membranes are moist.      Pharynx: Oropharynx is clear.   Eyes:      Conjunctiva/sclera: Conjunctivae normal.      Pupils: Pupils are equal, round, and reactive to light.   Cardiovascular:      Rate and Rhythm: Normal rate.      Heart sounds: Normal heart sounds.   Pulmonary:      Effort: Pulmonary effort is normal. No respiratory distress.      Breath sounds: Normal breath sounds. No wheezing, rhonchi or rales.   Abdominal:      General: Abdomen is flat. There is no distension.      Palpations: Abdomen is soft. " There is no mass.      Tenderness: There is no abdominal tenderness. There is no guarding or rebound.   Musculoskeletal:        Arms:       Cervical back: Neck supple. No rigidity.      Comments: Mild localized tenderness to right flank/ right lower back. No swelling. No rash.    Skin:     General: Skin is warm and dry.   Neurological:      General: No focal deficit present.      Mental Status: She is alert.      Cranial Nerves: Cranial nerves 2-12 are intact.         Diagnosis and associated orders:     1. Right flank pain       Comments/MDM:     This is a 74-year-old female with a history of dementia and recent hospitalization due to a UTI discharged 2 days ago.  Her  had some concern because she has not completely returned to baseline.  She is not as active.  Generally weak.  She is slower respond to questions.  She is complaining of right back pain.  See full history above.  Patient's vital signs are reassuring.  She is afebrile.  Discussed possible muscular pain to her right back.  Lower suspicion for pyelonephritis.  She has been compliant with medications per .  She is tolerating some fluids. She does respond yes or no to pain to certain areas and only complains of right sided back pain.   At this time, he may take the patient back to the hospital ER for an evaluation or closely monitor.  Make sure she is drinking plenty of fluids.  Continue Augmentin.  Can try ice application and over-the-counter Voltaren gel to the back area.  will closely monitor for now.   Recommend presenting to the ER tomorrow if no improvement, unable to drink fluids or tolerate medications, fever, increased confusion or altered mental status, decreased activity, or any other concerns       I personally reviewed prior external notes and test results pertinent to today's visit. Pathogenesis of diagnosis discussed including typical length and natural progression. Supportive care, natural history, differential  diagnoses, and indications for immediate follow-up discussed.   expresses understanding and agrees to plan.   denies any other questions or concerns.     Follow-up with the primary care physician for recheck, reevaluation, and consideration of further management.    Time spent evaluating the patient was 35 minutes which included preparing for the visit, obtaining history, examination, ordering labs/tests/procedures/medications, independent interpretation, discussion of plan, counseling/education, and documentation into chart.     Please note that this dictation was created using voice recognition software. I have made a reasonable attempt to correct obvious errors, but I expect that there are errors of grammar and possibly content that I did not discover before finalizing the note.    This note was electronically signed by Mathew Sanchez PA-C         [1]   Past Medical History:  Diagnosis Date    Alzheimer's disease (HCC)     Alzheimer's disease (HCC)     Anesthesia     PONV    Arthritis     RA and osteo    Backpain     Cancer (HCC) 2010    skin melanoma    Hypothyroid     Other specified symptom associated with female genital organs     vaginal mesh removed    Pain     bilateral knee    Renal disorder     renal fatigue    Unspecified disorder of thyroid     Unspecified urinary incontinence    [2] No Known Allergies

## 2025-05-16 PROBLEM — N39.0 RECURRENT UTI: Status: ACTIVE | Noted: 2025-01-01

## 2025-05-16 PROBLEM — N12 PYELONEPHRITIS: Status: ACTIVE | Noted: 2025-01-01

## 2025-05-16 NOTE — ED NOTES
Bedside report received from Mariluz LEE, assumed care of patient.  POC discussed with patient. Call light within reach, all needs addressed at this time.         Fall risk interventions in place: In place (all applicable per [place] Fall risk assessment)   Continuous monitoring: Cardiac Leads, Pulse Ox, or Blood Pressure  IVF/IV medications: Not Applicable   Oxygen: Room Air  Bedside sitter: Not Applicable   Isolation: Not Applicable

## 2025-05-16 NOTE — ASSESSMENT & PLAN NOTE
With apparent functional, nutritional, and cognitive decline  Her  is her caretaker  Hospice accepted

## 2025-05-16 NOTE — PROGRESS NOTES
4 Eyes Skin Assessment Completed by JESUS Rousseau and JESUS Hammer.    Head WDL  Ears WDL  Nose Redness and Blanching  Mouth WDL  Neck WDL  Breast/Chest WDL  Shoulder Blades Redness and Blanching  Spine WDL  (R) Arm/Elbow/Hand WDL  (L) Arm/Elbow/Hand Bruising  Abdomen WDL  Groin WDL  Scrotum/Coccyx/Buttocks Redness and Blanching  (R) Leg Dry, Flaky  (L) Leg Dry Flaky  (R) Heel/Foot/Toe Redness, Blanching, and Boggy. Cut on big toe   (L) Heel/Foot/Toe Redness, Blanching, and Boggy          Devices In Places Blood Pressure Cuff      Interventions In Place Heel Mepilex, Sacral Mepilex, TAP System, Pillows, Elbow Mepilex, Q2 Turns, Low Air Loss Mattress, Barrier Cream, and Heels Loaded W/Pillows    Possible Skin Injury Yes    Pictures Uploaded Into Epic Yes  Wound Consult Placed N/A  RN Wound Prevention Protocol Ordered Yes

## 2025-05-16 NOTE — ED TRIAGE NOTES
Chief Complaint   Patient presents with    Loss of Appetite     Poor appetite associated with generalized weakness since 2 days    PMH of dementia, hypothyroidism, recurrent UTIs    Back Pain     Back pain since  Wednesday     Ambulatory:  Yes  Alert and Oriented: x Alert, but has dementia  Oxygen Treatment: No    Pt came in to triage for the above complaints accompanied by     Respirations are even and unlabored.    Pt placed in lobby. Pt educated on triage process.     Pt encouraged to inform staff for any changes in condition or if needs help while waiting to be room in.      Vitals:    05/16/25 0626   BP: 125/84   Pulse: (!) 103   Resp: 16   Temp: 36.2 °C (97.2 °F)   SpO2: 94%

## 2025-05-16 NOTE — ED NOTES
Bedside report to Pavel LEE.  POC discussed with patient. Call light within reach, all needs addressed at this time.         Fall risk interventions in place: In place (all applicable per [place] Fall risk assessment)   Continuous monitoring: Cardiac Leads, Pulse Ox, or Blood Pressure  IVF/IV medications: Not Applicable   Oxygen: Room Air  Bedside sitter: Not Applicable   Isolation: Not Applicable

## 2025-05-16 NOTE — ASSESSMENT & PLAN NOTE
She had urinary tract infection last admission which treated with IV Ancef and improved substantially was discharged home on Augmentin.  Cultures from last admission on 5/11/2025 grew out E. coli resistant to Augmentin  She presented with gross hematuria and increased pyuria  CT A/P negative for acute abnormality of kidneys nor renal calculus, noted bladder thickening consistent with cystitis  Transitioned cefepime to ceftriaxone per prior susceptibilities, then to complete a bactrim course as outpatient

## 2025-05-16 NOTE — ED NOTES
Pharmacy Medication Reconciliation    ~Med rec updated and complete per patient family at bedside   ~Allergies have been verified   ~Is dispense history available in EPIC: partially   ~Pt home pharmacy: [facility]       ~Patient family reports that patient is on a 7 day course of Augmentin that was started on 5/14/2025      ~Anticoagulants (rivaroxaban, apixaban, edoxaban, dabigatran, warfarin, enoxaparin) taken in the last 14 days? No

## 2025-05-16 NOTE — ED PROVIDER NOTES
ED Provider Note    CHIEF COMPLAINT  Chief Complaint   Patient presents with    Loss of Appetite     Poor appetite associated with generalized weakness since 2 days    PMH of dementia, hypothyroidism, recurrent UTIs    Back Pain     Back pain since  Wednesday       EXTERNAL RECORDS REVIEWED    Discharge summary from recent hospitalization reviewed.  Admitted on May 11, discharged on the 13th with acute UTI concern for sepsis.  She presented with altered mental status.  Known history of frequent UTIs.  Typically confused during these episodes.  Patient was seen by PT and OT during this hospitalization, she was able to walk in the hallways with some assistance and PT and OT recommended home with home health.    HPI/ROS  LIMITATION TO HISTORY   Select: Altered mental status / Confusion and history of dementia  OUTSIDE HISTORIAN(S):  Significant other spouse    Jayshree Reinoso is a 74 y.o. female who presents to the emergency department accompanied by her .  Patient was recently admitted to the hospital from Sunday until Tuesday when she was discharged.  Ultimately per , diagnosed with a urinary tract infection and discharged home on Augmentin.  Patient was still complaining of back pain so he took her to the urgent care on Wednesday.  Since discharge, he has been unable to get her to eat very much.  She has been having difficulty taking her antibiotics.  She has not had a fever.  No vomiting or diarrhea.  She has not had a bowel movement since Sunday but also has had little in the way of intake and generally she is weak.  She prior to her recent hospitalization, patient was able to get up get herself around the house, get to the bathroom on her own.  They would do brief outings, go out to eat at [employer] for instance.  This has been difficult in the last few days prompting their return.    PAST MEDICAL HISTORY   has a past medical history of Alzheimer's disease (HCC), Alzheimer's disease (HCC), Anesthesia,  "Arthritis, Backpain, Cancer (HCC) (2010), Hypothyroid, Other specified symptom associated with female genital organs, Pain, Renal disorder, Unspecified disorder of thyroid, and Unspecified urinary incontinence.    SURGICAL HISTORY   has a past surgical history that includes scleral buckling (7/9/08); rectus repair (10/9/08); hernia repair; inguinal hernia repair (12/18/2008); laparoscopy (5/7/2009); femoral hernia repair (5/7/2009); fusion, spine, lumbar, plif (2/27/2013); lumbar laminectomy diskectomy (2/27/2013); gyn surgery; vaginectomy (7/25/2011); gyn surgery; gyn surgery; knee arthroplasty total (Right, 7/11/2016); gastroscopy-endo (8/11/2016); gastroscopy-endo (N/A, 7/26/2019); colonoscopy - endo (N/A, 7/27/2019); and colonoscopy-flexible (N/A, 11/5/2022).    FAMILY HISTORY  History reviewed. No pertinent family history.    SOCIAL HISTORY  Social History     Tobacco Use    Smoking status: Never     Passive exposure: Never    Smokeless tobacco: Never   Vaping Use    Vaping status: Never Used   Substance and Sexual Activity    Alcohol use: No    Drug use: No    Sexual activity: Not Currently       CURRENT MEDICATIONS  Home Medications       Reviewed by Surjit Dominguez (Pharmacy Tech) on 05/16/25 at 1212  Med List Status: Complete     Medication Last Dose Status   amoxicillin-clavulanate (AUGMENTIN) 875-125 MG Tab 5/15/2025 Active   B Complex Vitamins (VITAMIN B COMPLEX PO) 5/15/2025 Active   Coenzyme Q10 (CO Q 10 PO) 5/15/2025 Active   levothyroxine (SYNTHROID) 88 MCG Tab 5/16/2025 Active   Melatonin 5 MG Cap 5/15/2025 Active   sulfamethoxazole-trimethoprim (BACTRIM DS) 800-160 MG tablet Not Taking Active                  Audit from Redirected Encounters    **Home medications have not yet been reviewed for this encounter**         ALLERGIES  Allergies[1]    PHYSICAL EXAM  VITAL SIGNS: /77   Pulse 73   Temp 36.2 °C (97.2 °F) (Temporal)   Resp 16   Ht 1.575 m (5' 2\")   Wt 47.6 kg (105 lb)   SpO2 " 94%   BMI 19.20 kg/m²    Vitals reviewed.  Constitutional: Patient is oriented to person.  Chronically ill appearing.  Mild distress.    Head: Normocephalic and atraumatic.   Ears: Normal external ears bilaterally.   Mouth/Throat: Oropharynx is clear, with dry mucous membranes.  Eyes: Conjunctivae are normal.   Neck: Normal range of motion. Neck supple.   Cardiovascular: Normal rate, regular rhythm and normal heart sounds. Normal peripheral pulses.  Pulmonary/Chest: Effort normal and breath sounds normal. No respiratory distress, no wheezes, rhonchi, or rales  Abdominal: Soft. Bowel sounds are normal. There is no tenderness.  Musculoskeletal: Symmetric appearing, bilateral lower extremity edema, chronic, per .    Lymphadenopathy: No cervical adenopathy.   Neurological: No cranial nerve deficits. No focal deficits.  Gait not tested.  Skin: Skin is warm and dry. No erythema. No pallor.   Psychiatric: Patient has a normal mood and affect.     EKG/LABS  Results for orders placed or performed during the hospital encounter of 05/16/25   CBC WITH DIFFERENTIAL    Collection Time: 05/16/25  6:39 AM   Result Value Ref Range    WBC 15.1 (H) 4.8 - 10.8 K/uL    RBC 4.75 4.20 - 5.40 M/uL    Hemoglobin 13.7 12.0 - 16.0 g/dL    Hematocrit 44.1 37.0 - 47.0 %    MCV 92.8 81.4 - 97.8 fL    MCH 28.8 27.0 - 33.0 pg    MCHC 31.1 (L) 32.2 - 35.5 g/dL    RDW 50.4 (H) 35.9 - 50.0 fL    Platelet Count 371 164 - 446 K/uL    MPV 10.1 9.0 - 12.9 fL    Neutrophils-Polys 80.80 (H) 44.00 - 72.00 %    Lymphocytes 8.00 (L) 22.00 - 41.00 %    Monocytes 8.40 0.00 - 13.40 %    Eosinophils 1.90 0.00 - 6.90 %    Basophils 0.50 0.00 - 1.80 %    Immature Granulocytes 0.40 0.00 - 0.90 %    Nucleated RBC 0.00 0.00 - 0.20 /100 WBC    Neutrophils (Absolute) 12.19 (H) 1.82 - 7.42 K/uL    Lymphs (Absolute) 1.20 1.00 - 4.80 K/uL    Monos (Absolute) 1.27 (H) 0.00 - 0.85 K/uL    Eos (Absolute) 0.29 0.00 - 0.51 K/uL    Baso (Absolute) 0.07 0.00 - 0.12 K/uL     Immature Granulocytes (abs) 0.06 0.00 - 0.11 K/uL    NRBC (Absolute) 0.00 K/uL   COMP METABOLIC PANEL    Collection Time: 05/16/25  6:39 AM   Result Value Ref Range    Sodium 139 135 - 145 mmol/L    Potassium 3.9 3.6 - 5.5 mmol/L    Chloride 106 96 - 112 mmol/L    Co2 19 (L) 20 - 33 mmol/L    Anion Gap 14.0 7.0 - 16.0    Glucose 94 65 - 99 mg/dL    Bun 21 8 - 22 mg/dL    Creatinine 0.77 0.50 - 1.40 mg/dL    Calcium 9.3 8.5 - 10.5 mg/dL    Correct Calcium 9.6 8.5 - 10.5 mg/dL    AST(SGOT) 23 12 - 45 U/L    ALT(SGPT) 6 2 - 50 U/L    Alkaline Phosphatase 74 30 - 99 U/L    Total Bilirubin 0.4 0.1 - 1.5 mg/dL    Albumin 3.6 3.2 - 4.9 g/dL    Total Protein 7.9 6.0 - 8.2 g/dL    Globulin 4.3 (H) 1.9 - 3.5 g/dL    A-G Ratio 0.8 g/dL   ESTIMATED GFR    Collection Time: 05/16/25  6:39 AM   Result Value Ref Range    GFR (CKD-EPI) 81 >60 mL/min/1.73 m 2   Urinalysis, Culture if Indicated    Collection Time: 05/16/25  9:30 AM    Specimen: Urine, Clean Catch   Result Value Ref Range    Color Red (A)     Character Cloudy (A)     Specific Gravity >=1.030 <1.035    Ph 5.5 5.0 - 8.0    Glucose Negative Negative mg/dL    Ketones >=160 (A) Negative mg/dL    Protein >=300 (A) Negative mg/dL    Bilirubin Negative Negative    Urobilinogen, Urine 0.2 <=1.0 EU/dL    Nitrite Negative Negative    Leukocyte Esterase Small (A) Negative    Occult Blood Large (A) Negative    Micro Urine Req Microscopic    URINE MICROSCOPIC (W/UA)    Collection Time: 05/16/25  9:30 AM   Result Value Ref Range    WBC  (A) /hpf    RBC >100 (A) 0 - 2 /hpf    Bacteria Moderate (A) None /hpf    Epithelial Cells 0-2 0 - 5 /hpf    Urine Casts 0-2 0 - 2 /lpf    Hyaline Cast Present /lpf   URINE CULTURE(NEW)    Collection Time: 05/16/25 10:49 AM    Specimen: Urine   Result Value Ref Range    Significant Indicator NEG     Source UR     Site -     Culture Result -      I have independently interpreted this EKG    RADIOLOGY/PROCEDURES   I have independently  interpreted the diagnostic imaging associated with this visit and am waiting the final reading from the radiologist.   My preliminary interpretation is as follows: pending    Radiologist interpretation:  CT-RENAL COLIC EVALUATION(A/P W/O)    (Results Pending)       COURSE & MEDICAL DECISION MAKING    ASSESSMENT, COURSE AND PLAN  Care Narrative:     This is a 74-year-old female who presents with her .  He presents because he has been unable to get her to eat.  She has not been able to take her antibiotics.  She has a history of dementia.  Generally weak since recent discharge from the hospital where she was admitted for UTI.    Patient was reevaluated at the bedside.  Her  remains at the bedside.  she is demonstrating an increase in her white blood cell count after it initially declined.  Additionally, her urine is concerning.  She is also quite dehydrated.  And due to an inability to get her to drink fluids at home or take her antibiotics, I do not think it safe for her to be discharged.  Will plan for hospital admission, IV fluids infusing.   agreeable to this plan of care.    12:33 PM discussed with Dr. Calero, hospitalist, regarding patient's recent hospital admission, course at home and findings here in the ED.  Patient is failing to thrive.  White blood cell count has increased.  I have ordered CT scan but it appears she is got a number of people of had of her but he is aware that this is pending.  He agrees to admit the patient to their service.    Hydration: Based on the patient's presentation of Dehydration and Other failure to thrive the patient was given IV fluids. IV Hydration was used because oral hydration was not adequate alone. Upon recheck following hydration, the patient was unchanged.    ADDITIONAL PROBLEMS MANAGED    DISPOSITION AND DISCUSSIONS  I have discussed management of the patient with the following physicians and CONRADO's: Hospitalist    Discussion of management with  other [facility] or appropriate source(s): None     Escalation of care considered, and ultimately not performed: None    Barriers to care at this time, including but not limited to: None.     FINAL DIAGNOSIS  1. Dehydration    2. Acute UTI    3. Failure to thrive in adult         Electronically signed by: Socorro Cesar D.O., 5/16/2025 9:42 AM           [1] No Known Allergies

## 2025-05-16 NOTE — H&P
Hospital Medicine History & Physical Note    Date of Service  5/16/2025    Primary Care Physician  Wily Garcia M.D.    Consultants  none    Code Status  DNAR/DNI    Chief Complaint  Chief Complaint   Patient presents with    Loss of Appetite     Poor appetite associated with generalized weakness since 2 days    PMH of dementia, hypothyroidism, recurrent UTIs    Back Pain     Back pain since  Wednesday       History of Presenting Illness  Jayshree Reinoso is a 74 y.o. female who presented 5/16/2025 with flank pain and decreased level of consciousness.  Mrs. Reinoso has a past medical history of hypothyroidism, dementia, recurrent UTIs who was admitted to this hospital from 5/11/2025 through 5/13/2025 with symptomatic UTI.  On admission her white count was 15,000 she had a significantly positive urinalysis.  She was treated with IV Ancef and she improved.  Urine cultures grew out E. coli.  She was discharged home on Augmentin.  Since then her condition is worsened.  She is not eating or drinking and more lethargic.  She has complained of back pain to her .  He is also noticed blood in her urine which is new.  Brought her back her white count is now again back up to 15,000 whereas it had been 11,000 upon discharge.  Should be admitted for broad-spectrum IV antibiotics, IV fluids, and CT abdomen pelvis to evaluate for possible pyelonephritis as well as obstructing stone.    Her , Per is an excellent historian and is at bedside.  He confirms that she has a known DNR/DNI status.    I discussed the plan of care with Dr. Cesar.    Review of Systems  Review of Systems   Unable to perform ROS: Dementia       Past Medical History   has a past medical history of Alzheimer's disease (HCC), Alzheimer's disease (HCC), Anesthesia, Arthritis, Backpain, Cancer (HCC) (2010), Hypothyroid, Other specified symptom associated with female genital organs, Pain, Renal disorder, Unspecified disorder of thyroid, and  Unspecified urinary incontinence.    Surgical History   has a past surgical history that includes scleral buckling (7/9/08); rectus repair (10/9/08); hernia repair; inguinal hernia repair (12/18/2008); laparoscopy (5/7/2009); femoral hernia repair (5/7/2009); fusion, spine, lumbar, plif (2/27/2013); lumbar laminectomy diskectomy (2/27/2013); gyn surgery; vaginectomy (7/25/2011); gyn surgery; gyn surgery; knee arthroplasty total (Right, 7/11/2016); gastroscopy-endo (8/11/2016); gastroscopy-endo (N/A, 7/26/2019); colonoscopy - endo (N/A, 7/27/2019); and pr colonoscopy-flexible (N/A, 11/5/2022).     Family History  family history is not on file.   Family history reviewed with patient. There is no family history that is pertinent to the chief complaint.     Social History   reports that she has never smoked. She has never been exposed to tobacco smoke. She has never used smokeless tobacco. She reports that she does not drink alcohol and does not use drugs.    Allergies  Allergies[1]    Medications  Prior to Admission Medications   Prescriptions Last Dose Informant Patient Reported? Taking?   B Complex Vitamins (VITAMIN B COMPLEX PO) 5/15/2025 Morning Significant Other Yes Yes   Sig: Take 1 Tablet by mouth every day.   Coenzyme Q10 (CO Q 10 PO) 5/15/2025 Morning Significant Other Yes Yes   Sig: Take 1 Capsule by mouth every day.   Melatonin 5 MG Cap 5/15/2025 Evening Significant Other Yes Yes   Sig: Take 5 mg by mouth every evening.   amoxicillin-clavulanate (AUGMENTIN) 875-125 MG Tab 5/15/2025 Evening Significant Other Yes Yes   Sig: Take 1 Tablet by mouth 2 times a day. 7 days   levothyroxine (SYNTHROID) 88 MCG Tab 5/16/2025 Morning Significant Other Yes Yes   Sig: Take 88 mcg by mouth every morning on an empty stomach.   sulfamethoxazole-trimethoprim (BACTRIM DS) 800-160 MG tablet Not Taking Significant Other No No   Sig: Take 1 Tablet by mouth 2 times a day for 7 days.   Patient not taking: Reported on 5/16/2025     "  Facility-Administered Medications: None       Physical Exam  Temp:  [36.2 °C (97.2 °F)] 36.2 °C (97.2 °F)  Pulse:  [] 73  Resp:  [16-19] 16  BP: (125-142)/(75-87) 127/77  SpO2:  [93 %-95 %] 94 %  Blood Pressure : 127/77   Temperature: 36.2 °C (97.2 °F)   Pulse: 73   Respiration: 16   Pulse Oximetry: 94 %       Physical Exam  Vitals and nursing note reviewed.   Constitutional:       Appearance: She is ill-appearing.   Cardiovascular:      Rate and Rhythm: Normal rate and regular rhythm.   Pulmonary:      Effort: Pulmonary effort is normal.      Breath sounds: Normal breath sounds.   Abdominal:      General: There is no distension.      Tenderness: There is no abdominal tenderness.   Musculoskeletal:      Right lower leg: Edema present.      Left lower leg: Edema present.   Skin:     Coloration: Skin is pale.   Neurological:      Comments: Non-historian  somnolent   Psychiatric:      Comments: Minimal speech         Laboratory:  Recent Labs     05/16/25  0639   WBC 15.1*   RBC 4.75   HEMOGLOBIN 13.7   HEMATOCRIT 44.1   MCV 92.8   MCH 28.8   MCHC 31.1*   RDW 50.4*   PLATELETCT 371   MPV 10.1     Recent Labs     05/16/25  0639   SODIUM 139   POTASSIUM 3.9   CHLORIDE 106   CO2 19*   GLUCOSE 94   BUN 21   CREATININE 0.77   CALCIUM 9.3     Recent Labs     05/16/25  0639   ALTSGPT 6   ASTSGOT 23   ALKPHOSPHAT 74   TBILIRUBIN 0.4   GLUCOSE 94         No results for input(s): \"NTPROBNP\" in the last 72 hours.      No results for input(s): \"TROPONINT\" in the last 72 hours.    Imaging:  CT-RENAL COLIC EVALUATION(A/P W/O)    (Results Pending)           Assessment/Plan:  Justification for Admission Status  I anticipate this patient will require at least two midnights for appropriate medical management, necessitating inpatient admission because she will need at least 72 hours of broad-spectrum IV antibiotics until the finalized cultures come back given the recent course of IV Ancef and outpatient Augmentin and now with " worsening symptoms, flank pain and elevated white count 15,000 despite treatment.    Patient will need a Med/Surg bed on MEDICAL service .  The need is secondary to as above.    * Pyelonephritis- (present on admission)  Assessment & Plan  She had urinary tract infection last admission which treated with IV Ancef and improved substantially was discharged home on Augmentin.  Her white blood cell count on last admission was 15,000 and came down to 11,000 upon discharge  Cultures from last admission on 5/11/2025 grew out E. coli resistant to Augmentin  Now she has more hematuria, leukocyte Estrace positive with  white blood cells per high-powered field.  White blood cell count is now gone back up to 15,000.  She has complained of flank pain suggestive of either pyelonephritis and/or ureterolithiasis.  Broaden the antibiotic choice to IV cefepime and check CT abdomen pelvis to evaluate for pyelonephritis and obstructing stone.  IV fluids will be given as she is not tolerating oral intake    Recurrent UTI- (present on admission)  Assessment & Plan  She has battled numerous UTIs and may be an excellent candidate for vaginal estrogen cream outpatient    Acute metabolic encephalopathy- (present on admission)  Assessment & Plan  History of dementia now with progressive encephalopathy consistent with acute delirium due to UTI    Dementia (HCC)- (present on admission)  Assessment & Plan  Advanced  Her  is her caretaker    Hypothyroidism- (present on admission)  Assessment & Plan  Continue synthroid 88 mcg daily    DNR (do not resuscitate)- (present on admission)  Assessment & Plan  As was discussed with her .        VTE prophylaxis: Xarelto 10 mg daily as prophylaxis       [1] No Known Allergies

## 2025-05-17 PROBLEM — N30.01 ACUTE HEMORRHAGIC CYSTITIS: Status: ACTIVE | Noted: 2025-01-01

## 2025-05-17 PROBLEM — R41.0 DELIRIUM: Status: ACTIVE | Noted: 2025-01-01

## 2025-05-17 PROBLEM — F03.C0 SEVERE DEMENTIA WITHOUT BEHAVIORAL DISTURBANCE, PSYCHOTIC DISTURBANCE, MOOD DISTURBANCE, OR ANXIETY (HCC): Status: ACTIVE | Noted: 2018-07-13

## 2025-05-17 NOTE — DISCHARGE PLANNING
Received Choice Form at: 1022  Agency/Facility Name: [facility]  Sent Referral per Choice Form at: 1026

## 2025-05-17 NOTE — DISCHARGE PLANNING
Case Management Discharge Planning    Admission Date: 5/16/2025  GMLOS: 3.8  ALOS: 1      Anticipated Discharge Dispo: Discharge Disposition: D/T to hospice home (50)  Discharge Address: [address] [place] [address]  [place] [address]  Discharge Contact Phone Number: [phone]    Met with spouse, Per and pt's son at bedside to discuss hospice choice. Spouse is debating taking pt home on hospice vs [facility] placement. Discussed both scenarios. Encouraged spouse to speak with hospice re: how much support they can provide. Obtained hospice choice and faxed to [facility]

## 2025-05-17 NOTE — CARE PLAN
The patient is Watcher - Medium risk of patient condition declining or worsening    Shift Goals  Clinical Goals: maintain free from injury t/o night shift  Patient Goals: VINCENT    Progress made toward(s) clinical / shift goals:  A&Ox1.  Denied any pain.  Glasses removed when she went to sleep.  Q2 turns, BRUCE bed, purewick and incontinent care provided.     Patient is not progressing towards the following goals:      Problem: Knowledge Deficit - Standard  Goal: Patient and family/care givers will demonstrate understanding of plan of care, disease process/condition, diagnostic tests and medications  Outcome: Not Progressing

## 2025-05-17 NOTE — PROGRESS NOTES
4 Eyes skin assessment was not completed, nor is it needed for the remainder of the pt's hospital stay. This is for the comfort of the patient. Dr. Valdovinos Gave the ok for them not to be completed any more.

## 2025-05-17 NOTE — PROGRESS NOTES
Hospital Medicine Daily Progress Note    Date of Service  5/17/2025    Chief Complaint  Jayshree Reinoso is a 74 y.o. female with hypothyroidism and dementia admitted 5/16/2025 with hemorrhagic cystitis.    Hospital Course  No notes on file    Interval Problem Update    Her  states that it has been emotionally difficult due to her recurrent hospitalizations and infections. He sees that she has cognitive decline.  He is unsure he will be able to continue caring for her due to decreasing mobility and cognition.  She inconsistently and rarely eats.  He inquires about making her comfortable and preparing for EOL.  We discussed that the recurrent infections and hospitalizations are the prognosis of the dementia. It is expected that she will not return to prior cognitive baseline due to recurrent illnesses.  He is agreeable to hospice referral and QFN for Group Home consideration.    She states she is not in any pain.    CBC reviewed, WBC normalized.  BMP reviewed, NAGMA 19/13  Ucx 5/11/25 reviewed, E coli susceptible to bactrim, ceftriaxone.    I have discussed this patient's plan of care and discharge plan at IDT rounds today with Case Management, Nursing, Nursing leadership, and other members of the IDT team.    Consultants/Specialty  None    Code Status  DNAR/DNI    Disposition  The patient is not medically cleared for discharge to home or a post-acute facility.  Anticipate discharge to: hospice    I have placed the appropriate orders for post-discharge needs.    Review of Systems  Review of Systems   Unable to perform ROS: Dementia        Physical Exam  Temp:  [35.9 °C (96.7 °F)-36.5 °C (97.7 °F)] 36.2 °C (97.2 °F)  Pulse:  [65-80] 71  Resp:  [16-19] 16  BP: (126-155)/(71-95) 145/87  SpO2:  [90 %-95 %] 94 %    Physical Exam  Vitals and nursing note reviewed. Exam conducted with a chaperone present ( at bedside).   Constitutional:       General: She is not in acute distress.     Appearance: She is  ill-appearing (Chronically). She is not toxic-appearing or diaphoretic.   HENT:      Head:      Comments: Bitemporal wasting     Nose: Nose normal.      Mouth/Throat:      Mouth: Mucous membranes are dry.   Eyes:      General: No scleral icterus.     Conjunctiva/sclera: Conjunctivae normal.   Cardiovascular:      Rate and Rhythm: Normal rate and regular rhythm.      Pulses: Normal pulses.      Heart sounds: Normal heart sounds. No murmur heard.     No friction rub. No gallop.   Pulmonary:      Effort: Pulmonary effort is normal. No respiratory distress.      Breath sounds: Normal breath sounds. No wheezing, rhonchi or rales.   Abdominal:      General: Abdomen is flat. Bowel sounds are normal. There is no distension.      Palpations: Abdomen is soft.      Tenderness: There is no abdominal tenderness. There is no guarding or rebound.   Genitourinary:     Comments: No wilkes  Musculoskeletal:      Cervical back: Neck supple.      Right lower leg: Edema present.      Left lower leg: Edema present.      Comments: Clavicular and thenar wasting. 3+ pitting edema to thighs   Neurological:      Mental Status: She is alert.   Psychiatric:         Attention and Perception: She is inattentive.         Mood and Affect: Mood normal. Affect is blunt.         Speech: Speech is delayed.         Behavior: Behavior is slowed. Behavior is cooperative.         Fluids    Intake/Output Summary (Last 24 hours) at 5/17/2025 1016  Last data filed at 5/17/2025 0500  Gross per 24 hour   Intake 1020 ml   Output --   Net 1020 ml        Laboratory  Recent Labs     05/16/25  0639 05/17/25  0203   WBC 15.1* 10.7   RBC 4.75 4.42   HEMOGLOBIN 13.7 12.9   HEMATOCRIT 44.1 41.3   MCV 92.8 93.4   MCH 28.8 29.2   MCHC 31.1* 31.2*   RDW 50.4* 50.4*   PLATELETCT 371 282   MPV 10.1 10.5     Recent Labs     05/16/25  0639 05/17/25  0203   SODIUM 139 137   POTASSIUM 3.9 4.0   CHLORIDE 106 105   CO2 19* 19*   GLUCOSE 94 65   BUN 21 14   CREATININE 0.77 0.44*    CALCIUM 9.3 8.6                   Imaging  CT-RENAL COLIC EVALUATION(A/P W/O)   Final Result      1.  No urinary tract calculus identified. No renal collecting system dilatation.   2.  Diffuse wall thickening of the urinary bladder could relate to cystitis.   3.  Patchy airspace opacity in the bilateral lung bases, likely atelectasis.           Assessment/Plan  * Acute cystitis with hematuria- (present on admission)  Assessment & Plan  She had urinary tract infection last admission which treated with IV Ancef and improved substantially was discharged home on Augmentin.  Cultures from last admission on 5/11/2025 grew out E. coli resistant to Augmentin  She presented with gross hematuria and increased pyuria  CT A/P negative for acute abnormality of kidneys nor renal calculus, noted bladder thickening consistent with cystitis  Transitioned cefepime to ceftriaxone per prior susceptibilities  Anticipate transition to complete a bactrim course, likely GOC will be discharge on hospice    Recurrent UTI- (present on admission)  Assessment & Plan  Likely due to dementia and functional incontinence    Delirium- (present on admission)  Assessment & Plan  Hypoactive  Minimize centrally-acting medications  Delirium precautions    DNR (do not resuscitate)- (present on admission)  Assessment & Plan  As was discussed with her .    Severe dementia without behavioral disturbance, psychotic disturbance, mood disturbance, or anxiety ([facility])- (present on admission)  Assessment & Plan  Advanced  Her  is her caretaker  Hospice consultation    Acquired hypothyroidism- (present on admission)  Assessment & Plan  Continue synthroid 88 mcg daily      VTE prophylaxis:    Xarelto 10mg daily as prophylaxis    I have performed a physical exam and reviewed and updated ROS and Plan today (5/17/2025). In review of yesterday's note (5/16/2025), there are no changes except as documented above.

## 2025-05-17 NOTE — PROGRESS NOTES
4 Eyes Skin Assessment Completed by JESUS Corado and JESUS Michelle.    Head WDL  Ears WDL  Nose Redness and Non-Blanching  Mouth WDL  Neck WDL  Breast/Chest Redness and Blanching  Shoulder Blades WDL  Spine WDL  (R) Arm/Elbow/Hand WDL  (L) Arm/Elbow/Hand Bruising  Abdomen WDL  Groin WDL  Scrotum/Coccyx/Buttocks Redness and Blanching  (R) Leg Swelling  (L) Leg Swelling  (R) Heel/Foot/Toe Redness, Blanching, and Boggy  First toe has little cut  (L) Heel/Foot/Toe Redness, Blanching, and Boggy          Devices In Places piv      Interventions In Place Removed glasses at night, Heel Mepilex, Elbow Mepilex, Q2 Turns, Low Air Loss Mattress, Barrier Cream, and Heels Loaded W/Pillows    Possible Skin Injury Yes    Pictures Uploaded Into Epic Yes  Wound Consult Placed N/A  RN Wound Prevention Protocol Ordered Yes

## 2025-05-17 NOTE — DISCHARGE PLANNING
Pt admitted to [facility] 5/11-5/13/2025 and re admitted 5/16/2025 due to poor appetite and weakness. Assessment copied from prior admission     NOK is pt's spouse, Per, [phone]     RN CM met with pt's spouse, Per, at bedside for assessment. Pt lives with Per in a single story home [place]. At baseline she has dementia and is able to perform some ADL's independently. She walks holding on to things, has a walker but won't use it. She dresses and feeds her self, spouse assists with showering and has a shower chair. He drives her. Their daughter is currently undergoing treatment for her own health issues. Patient has never been to SNF but has had home health before and Per open to that again if needed. She collects PERS and gets social security for income. They have no financial concerns. She sees Wily Garcia for PCP. Hope is that patient is able to discharge home with continued support from spouse.    Care Transition Team Assessment    Information Source  Orientation Level: Oriented to person, Disoriented to place, Disoriented to time, Disoriented to situation  Information Given By: Spouse  Informant's Name: Per Reinoso  Who is responsible for making decisions for patient? : Spouse    Readmission Evaluation  Is this a readmission?: Yes - unplanned readmission  Was an appointment arranged for you prior to discharge?: No  Were there new prescriptions you were supposed to fill after you were discharged?: Yes, prescriptions filled  Did you understand your discharge instructions?: Yes  Did you have enough support after your last discharge?: Yes    Elopement Risk  Legal Hold: No  Ambulatory or Self Mobile in Wheelchair: No-Not an Elopement Risk  Disoriented: Place-At Risk for Elopement, Time-At Risk for Elopement, Situation-At Risk for Elopement  Elopement Risk: Not at Risk for Elopement  Picture of Patient on Inside Chart Front Cover: No (See Comments)  Purple Armband on Patient: No (See  Comments)    Interdisciplinary Discharge Planning  Lives with - Patient's Self Care Capacity: Spouse  Support Systems: Spouse / Significant Other  Housing / Facility: 1 Story House  Name of Care Facility: N/A    Discharge Preparedness  What is your plan after discharge?: Home with help  What are your discharge supports?: Spouse  Prior Functional Level: Needs Assist with Activities of Daily Living, Needs Assist with Medication Management    Functional Assesment  Prior Functional Level: Needs Assist with Activities of Daily Living, Needs Assist with Medication Management    Finances  Financial Barriers to Discharge: No  Prescription Coverage: Yes    Vision / Hearing Impairment  Vision Impairment : Yes  Right Eye Vision: Wears Glasses  Left Eye Vision: Wears Glasses  Hearing Impairment : No      Domestic Abuse  Have you ever been the victim of abuse or violence?: No  Possible Abuse/Neglect Reported to:: Not Applicable    Psychological Assessment  History of Substance Abuse: None  History of Psychiatric Problems: No  Non-compliant with Treatment: No  Newly Diagnosed Illness: Yes    Discharge Risks or Barriers  Discharge risks or barriers?: No    Anticipated Discharge Information  Discharge Disposition: D/T to hospice home (50)  Discharge Address: [address] [place] [address] [place] [address]  Discharge Contact Phone Number: [phone]

## 2025-05-18 PROBLEM — Z51.5 COMFORT MEASURES ONLY STATUS: Status: ACTIVE | Noted: 2025-01-01

## 2025-05-18 NOTE — CARE PLAN
The patient is Stable - Low risk of patient condition declining or worsening    Shift Goals  Clinical Goals: Patuent safety and comfort  Patient Goals: VINCENT  Family Goals: VINCENT    Progress made toward(s) clinical / shift goals:  Patient remain free from fall this shift, Q2 turns, on  BRUCE Resting comfortably, bed alarm set, call light within reach.     Patient is not progressing towards the following goals:

## 2025-05-18 NOTE — PROGRESS NOTES
Hospital Medicine Daily Progress Note    Date of Service  5/18/2025    Chief Complaint  Jayshree Reinoso is a 74 y.o. female with hypothyroidism and dementia admitted 5/16/2025 with hemorrhagic cystitis.    Hospital Course  No notes on file    Interval Problem Update    ROBERT ON  POC discussed with her . Planning for discharge home tomorrow morning with hospice.  He has prepared a bedroom for her.  We discussed Hospice on-call 24/7 and to call them first rather than call 911.  She seems comfortable and he is agreeable to comfort care until discharge.    She is nonverbal this morning.    No new labs nor imaging.    I have discussed this patient's plan of care and discharge plan at IDT rounds today with Case Management, Nursing, Nursing leadership, and other members of the IDT team.    Consultants/Specialty  None    Code Status  Comfort Care/DNR    Disposition  The patient is medically cleared for discharge to home or a post-acute facility.  Anticipate discharge to: hospice    I have placed the appropriate orders for post-discharge needs.    Review of Systems  Review of Systems   Unable to perform ROS: Dementia        Physical Exam  Temp:  [36.3 °C (97.4 °F)-36.8 °C (98.2 °F)] 36.3 °C (97.4 °F)  Pulse:  [71-86] 79  Resp:  [16-20] 16  BP: (133-144)/(85-98) 144/98  SpO2:  [92 %-94 %] 94 %    Physical Exam  Vitals and nursing note reviewed. Exam conducted with a chaperone present ( at bedside).   Constitutional:       General: She is not in acute distress.     Appearance: She is ill-appearing (Chronically). She is not toxic-appearing or diaphoretic.   HENT:      Head:      Comments: Bitemporal wasting     Nose: Nose normal.   Pulmonary:      Effort: Pulmonary effort is normal.   Genitourinary:     Comments: No wilkes  Musculoskeletal:      Cervical back: Neck supple.      Comments: Clavicular and thenar wasting   Neurological:      Mental Status: She is alert.   Psychiatric:         Attention and Perception: She  is inattentive.         Mood and Affect: Mood normal. Affect is blunt.         Speech: She is noncommunicative.         Fluids    Intake/Output Summary (Last 24 hours) at 5/18/2025 0951  Last data filed at 5/18/2025 0600  Gross per 24 hour   Intake 100 ml   Output 1100 ml   Net -1000 ml        Laboratory  Recent Labs     05/16/25  0639 05/17/25  0203   WBC 15.1* 10.7   RBC 4.75 4.42   HEMOGLOBIN 13.7 12.9   HEMATOCRIT 44.1 41.3   MCV 92.8 93.4   MCH 28.8 29.2   MCHC 31.1* 31.2*   RDW 50.4* 50.4*   PLATELETCT 371 282   MPV 10.1 10.5     Recent Labs     05/16/25  0639 05/17/25  0203   SODIUM 139 137   POTASSIUM 3.9 4.0   CHLORIDE 106 105   CO2 19* 19*   GLUCOSE 94 65   BUN 21 14   CREATININE 0.77 0.44*   CALCIUM 9.3 8.6                   Imaging  CT-RENAL COLIC EVALUATION(A/P W/O)   Final Result      1.  No urinary tract calculus identified. No renal collecting system dilatation.   2.  Diffuse wall thickening of the urinary bladder could relate to cystitis.   3.  Patchy airspace opacity in the bilateral lung bases, likely atelectasis.           Assessment/Plan  * Acute cystitis with hematuria- (present on admission)  Assessment & Plan  She had urinary tract infection last admission which treated with IV Ancef and improved substantially was discharged home on Augmentin.  Cultures from last admission on 5/11/2025 grew out E. coli resistant to Augmentin  She presented with gross hematuria and increased pyuria  CT A/P negative for acute abnormality of kidneys nor renal calculus, noted bladder thickening consistent with cystitis  Transitioned cefepime to ceftriaxone per prior susceptibilities, then to complete a bactrim course as outpatient    Comfort measures only status  Assessment & Plan  Terminal dementia  Morphine PRN dyspnea, pain  Haloperidol PRN anxiety, agitation, delirium  APAP PRN fever  Lactulose, bisacodyl PRN constipation  Ondansetron PRN nausea, vomiting  Accepted by [facility]    Recurrent UTI- (present  on admission)  Assessment & Plan  Likely due to dementia and functional incontinence    Delirium- (present on admission)  Assessment & Plan  Hypoactive  Minimize centrally-acting medications  Delirium precautions    DNR (do not resuscitate)- (present on admission)  Assessment & Plan  As was discussed with her .    Severe dementia without behavioral disturbance, psychotic disturbance, mood disturbance, or anxiety (HCC)- (present on admission)  Assessment & Plan  With apparent functional, nutritional, and cognitive decline  Her  is her caretaker  Hospice accepted    Acquired hypothyroidism- (present on admission)  Assessment & Plan  Continue synthroid 88 mcg daily      VTE prophylaxis:    pharmacologic prophylaxis contraindicated due to comfort care    I have performed a physical exam and reviewed and updated ROS and Plan today (5/18/2025). In review of yesterday's note (5/17/2025), there are no changes except as documented above.

## 2025-05-18 NOTE — HOSPICE
[facility] referral/consult response    Is this patient accepted to [facility]?: Yes  What hospice level of care?: Routine/Community  Approved by provider: FABIEN Mckenzie    Anticipated DC date: 5/19/2025  DC Barriers:  Additional Information:    Spoke with spouse, Per at length. He desires to have patient at home with hospice services on Monday. He declined any need for DMEs at this point but is preparing one bedroom in their home for a hospital bed down the road. Per is also agreeable to transport patient via family car if safe to do so. Hospice consents done through [employer]. Hospice team to follow up on anticipated discharge on Monday.

## 2025-05-18 NOTE — ASSESSMENT & PLAN NOTE
Terminal dementia  Morphine PRN dyspnea, pain  Haloperidol PRN anxiety, agitation, delirium  APAP PRN fever  Lactulose, bisacodyl PRN constipation  Ondansetron PRN nausea, vomiting  Accepted by [facility]

## 2025-05-18 NOTE — THERAPY
Physical Therapy Contact Note    Patient Name: Jayshree Reinoso  Age:  74 y.o., Sex:  female  Medical Record #: [id number]  Today's Date: 5/17/2025    PT consult received, per chart review pt is planning for hospice to home vs group home; will monitor for need for PT evaluation for family training for caregiving on hospice if to return home at family request;    Cassy GILL, PT,  [phone]

## 2025-05-18 NOTE — PROGRESS NOTES
4 Eyes Skin Assessment Completed by JESUS oMrejon and JESUS Michelle.    Head Skin dryness face  Ears WDL  Nose Redness and Blanching, indentation from eye glass  Mouth WDL  Neck WDL  Breast/Chest Redness and Blanching  Shoulder Blades WDL  Spine WDL, flaky  (R) Arm/Elbow/Hand WDL  (L) Arm/Elbow/Hand Bruising  Abdomen WDL  Groin WDL  Scrotum/Coccyx/Buttocks WDL  (R) Leg Swelling  (L) Leg Swelling  (R) Heel/Foot/Toe Redness, Blanching, and Boggy  (L) Heel/Foot/Toe Redness, Blanching, and Boggy          Devices In Places PIV      Interventions In Place Heel Mepilex, Elbow Mepilex, Q2 Turns, Low Air Loss Mattress, Barrier Cream, and Heels Loaded W/Pillows, removed glasses at night    Possible Skin Injury No    Pictures Uploaded Into Epic N/A  Wound Consult Placed N/A  RN Wound Prevention Protocol Ordered Yes

## 2025-05-19 NOTE — DISCHARGE SUMMARY
Discharge Summary    CHIEF COMPLAINT ON ADMISSION  Chief Complaint   Patient presents with    Loss of Appetite     Poor appetite associated with generalized weakness since 2 days    PMH of dementia, hypothyroidism, recurrent UTIs    Back Pain     Back pain since  Wednesday       Reason for Admission  Acute Cystitis with Hematuria     Admission Date  5/16/2025    CODE STATUS  Comfort Care/DNR    HPI & HOSPITAL COURSE  This is a 74 y.o. female with dementia, hypothyroidism, and recurrent cystitis here with anorexia, hypoactive delirium, and leukocytosis.    She returned to [facility] due to her  noting gross hematuria, worsening cognition, behavioral withdrawal, and anorexia. WBC elevated at 15K. UA positive for increased pyuria. CT renal colic was negative for nephrolithiasis but noted cystitis. She was initiated on cefepime which was de-escalated to ceftriaxone and then bactrim based on prior E coli susceptibilities.    Her  inquired about transition to comfort based on apparent cognitive, nutritional, and functional decline. She was referred and accepted by [facility].    Therefore, she is discharged in guarded and stable condition to hospice.    The patient met 2-midnight criteria for an inpatient stay at the time of discharge.    Discharge Date  5/19/2025    FOLLOW UP ITEMS POST DISCHARGE    Hospice    DISCHARGE DIAGNOSES  Principal Problem:    Acute cystitis with hematuria (POA: Yes)  Active Problems:    Acquired hypothyroidism (POA: Yes)    Severe dementia without behavioral disturbance, psychotic disturbance, mood disturbance, or anxiety (HCC) (POA: Yes)    Delirium (POA: Yes)    Recurrent UTI (POA: Yes)    Comfort measures only status (POA: No)  Resolved Problems:    * No resolved hospital problems. *      FOLLOW UP  Future Appointments   Date Time Provider Department Center   5/19/2025  8:45 AM Burt Hill R.N. [facility] None     [facility]  [id number] [address]  [address]  [phone]  Call  As needed      MEDICATIONS ON DISCHARGE     Medication List        START taking these medications        Instructions   * acetaminophen 650 MG Supp  Commonly known as: Tylenol   Insert 1 Suppository into the rectum every four hours as needed for Fever.  Dose: 650 mg     * acetaminophen 500 MG Tabs  Commonly known as: Tylenol   Take 2 Tablets by mouth every 6 hours as needed for Fever.  Dose: 1,000 mg     bisacodyl 10 MG Supp  Commonly known as: Dulcolax   Insert 1 Suppository into the rectum 1 time a day as needed (constipation).  Dose: 10 mg     carboxymethylcellulose 0.5 % Soln  Commonly known as: Refresh Tears   Administer 1 Drop into both eyes as needed (dry eyes).  Dose: 1 Drop     haloperidol 2 MG/ML Conc  Commonly known as: Haldol   Take 1 mL by mouth every 2 hours as needed (Agitation/Delirium).  Dose: 2 mg     lactulose 20 GM/30ML Soln   Take 15 mL by mouth 1 time a day as needed (constipation).  Dose: 10 g     morphine 20 MG/ML Soln  Commonly known as: Roxanol   Place 0.25-1 mL under the tongue every 1 hour as needed (pain, shortness of breath) for up to 7 days.  Dose: 5-20 mg     ondansetron 4 MG Tbdp  Commonly known as: Zofran ODT   Take 1 Tablet by mouth every four hours as needed for Nausea/Vomiting.  Dose: 4 mg     sulfamethoxazole-trimethoprim 800-160 MG tablet  Commonly known as: Bactrim DS   Take 1 Tablet by mouth every 12 hours. Start evening 5/19/25  Dose: 1 Tablet           * This list has 2 medication(s) that are the same as other medications prescribed for you. Read the directions carefully, and ask your doctor or other care provider to review them with you.                CONTINUE taking these medications        Instructions   CO Q 10 PO   Take 1 Capsule by mouth every day.  Dose: 1 Capsule     levothyroxine 88 MCG Tabs  Commonly known as: Synthroid   Take 88 mcg by mouth every morning on an empty stomach.  Dose: 88 mcg     Melatonin 5 MG Caps   Take 5 mg by mouth  every evening.  Dose: 5 mg     VITAMIN B COMPLEX PO   Take 1 Tablet by mouth every day.  Dose: 1 Tablet              Allergies  Allergies[1]    DIET  Orders Placed This Encounter   Procedures    Diet Order Diet: Regular     Standing Status:   Standing     Number of Occurrences:   1     Diet::   Regular [1]       ACTIVITY  As tolerated.  Weight bearing as tolerated    CONSULTATIONS  Hospice    PROCEDURES  None    LABORATORY  Lab Results   Component Value Date    SODIUM 137 05/17/2025    POTASSIUM 4.0 05/17/2025    CHLORIDE 105 05/17/2025    CO2 19 (L) 05/17/2025    GLUCOSE 65 05/17/2025    BUN 14 05/17/2025    CREATININE 0.44 (L) 05/17/2025    CREATININE 0.8 04/24/2009        Lab Results   Component Value Date    WBC 10.7 05/17/2025    HEMOGLOBIN 12.9 05/17/2025    HEMATOCRIT 41.3 05/17/2025    PLATELETCT 282 05/17/2025        Total time of the discharge process exceeds 33 minutes.         [1] No Known Allergies

## 2025-05-19 NOTE — CARE PLAN
The patient is Watcher - Medium risk of patient condition declining or worsening    Shift Goals  Clinical Goals: Patient will remain comfortable throughout shift  Patient Goals: VINCENT  Family Goals: VINCENT    Progress made toward(s) clinical / shift goals:    Patient is alert, responds to voice, answer simple yes and no questions but unable to make meaningful conversation. Patient kept comfortable throughout shift, q2 turns done, bed alarm is on, bed in low position and call light within reach.    Patient is not progressing towards the following goals:      Problem: Knowledge Deficit - Standard  Goal: Patient and family/care givers will demonstrate understanding of plan of care, disease process/condition, diagnostic tests and medications  Description: Target End Date:  1-3 days or as soon as patient condition allowsDocument in Patient Education1.  Patient and family/caregiver oriented to unit, equipment, visitation policy and means for communicating concern2.  Complete/review Learning Assessment3.  Assess knowledge level of disease process/condition, treatment plan, diagnostic tests and medications4.  Explain disease process/condition, treatment plan, diagnostic tests and medications  Outcome: Not Progressing

## 2025-05-19 NOTE — DISCHARGE INSTRUCTIONS
Discharge Instructions per El Valdovinos M.D.    You were hospitalized for blood in your urine (hematuria) due to recurrent urinary tract infection (acute cystitis). You have opted to transition to Hospice to focus on remaining quality of life.    DIET: Regular    ACTIVITY: Regular    DIAGNOSIS: Acute Cystitis with Hematuria    Call Hospice for any questions, concerns, or needs. Do not return to the ED nor call 911 unless instructed to do so by Hospice.

## 2025-05-19 NOTE — DISCHARGE PLANNING
Case Management Discharge Planning    Admission Date: 5/16/2025  GMLOS: 3.8  ALOS: 3    6-Clicks ADL Score: 15  6-Clicks Mobility Score: 7  PT and/or OT Eval ordered: NA  Post-acute Referrals Ordered: NA  Post-acute Choice Obtained: NA  Has referral(s) been sent to post-acute provider:  NINO      Anticipated Discharge Dispo: Discharge Disposition: D/T to hospice home (50)  Discharge Address: [address] [place] [id number]  Discharge Contact Phone Number: [phone]    DME Needed: No    Action(s) Taken: Patient is medically cleared to discharge home with hospice today. [facility] accepted. Patient's family will transport patient home today.     Escalations Completed: None    Medically Clear: Yes    Next Steps: no other CM needs     Barriers to Discharge: None    Is the patient up for discharge tomorrow: No - today

## (undated) DEVICE — CANISTER SUCTION RIGID RED 1500CC (40EA/CA)

## (undated) DEVICE — TUBE CONNECTING SUCTION - CLEAR PLASTIC STERILE 72 IN (50EA/CA)

## (undated) DEVICE — SODIUM CHL. INJ. 0.9% 500ML (24EA/CA 50CA/PF)

## (undated) DEVICE — SYRINGE 3 CC 22 GA X 1-1/2 - NDL SAFETY (50/BX 8BX/CA)

## (undated) DEVICE — ELECTRODE 850 FOAM ADHESIVE - HYDROGEL RADIOTRNSPRNT (50/PK)

## (undated) DEVICE — MASK WITH FACE SHIELD (25/BX 4BX/CA)

## (undated) DEVICE — CON SEDATION/>5 YR 1ST 15 MIN

## (undated) DEVICE — WATER IRRIGATION STERILE 1000ML (12EA/CA)

## (undated) DEVICE — FILM CASSETTE ENDO

## (undated) DEVICE — CANNULA O2 COMFORT SOFT EAR ADULT 7 FT TUBING (50/CA)

## (undated) DEVICE — BITE BLOCK ADULT 60FR (100EA/CA)

## (undated) DEVICE — CONTAINER, SPECIMEN, STERILE

## (undated) DEVICE — CON SEDATION EA ADDL 15 MIN

## (undated) DEVICE — MANIFOLD NEPTUNE 1 PORT (20/PK)

## (undated) DEVICE — TOWEL STOP TIMEOUT SAFETY FLAG (40EA/CA)

## (undated) DEVICE — KIT CUSTOM PROCEDURE SINGLE FOR ENDO  (15/CA)

## (undated) DEVICE — SYRINGE DISP. 50CC LS - (40/BX)

## (undated) DEVICE — TUBING CLEARLINK DUO-VENT - C-FLO (48EA/CA)

## (undated) DEVICE — TUBE SUCTION YANKAUER  1/4 X 6FT (20EA/CA)"

## (undated) DEVICE — BASIN EMESIS DISP. - (250/CA)

## (undated) DEVICE — SYRINGE 6 CC 20 GA X 1 1/2 - NDL SAFETY  (50/BX)

## (undated) DEVICE — GOWN SURGEONS X-LARGE - DISP. (30/CA)

## (undated) DEVICE — SET EXTENSION WITH 2 PORTS (48EA/CA) ***PART #2C8610 IS A SUBSTITUTE*****

## (undated) DEVICE — SOD. CHL 10CC SYRINGE PREFILL - W/10 CC (30/BX)